# Patient Record
Sex: FEMALE | Race: WHITE | Employment: FULL TIME | URBAN - METROPOLITAN AREA
[De-identification: names, ages, dates, MRNs, and addresses within clinical notes are randomized per-mention and may not be internally consistent; named-entity substitution may affect disease eponyms.]

---

## 2017-01-18 ENCOUNTER — OFFICE VISIT (OUTPATIENT)
Dept: FAMILY MEDICINE CLINIC | Facility: CLINIC | Age: 37
End: 2017-01-18

## 2017-01-18 VITALS
BODY MASS INDEX: 27.89 KG/M2 | SYSTOLIC BLOOD PRESSURE: 104 MMHG | HEART RATE: 80 BPM | HEIGHT: 65 IN | DIASTOLIC BLOOD PRESSURE: 78 MMHG | RESPIRATION RATE: 18 BRPM | TEMPERATURE: 99 F | WEIGHT: 167.38 LBS

## 2017-01-18 DIAGNOSIS — F32.1 MODERATE SINGLE CURRENT EPISODE OF MAJOR DEPRESSIVE DISORDER (HCC): ICD-10-CM

## 2017-01-18 DIAGNOSIS — F41.1 GENERALIZED ANXIETY DISORDER: ICD-10-CM

## 2017-01-18 DIAGNOSIS — Z51.81 ENCOUNTER FOR THERAPEUTIC DRUG MONITORING: Primary | ICD-10-CM

## 2017-01-18 PROCEDURE — 99213 OFFICE O/P EST LOW 20 MIN: CPT | Performed by: FAMILY MEDICINE

## 2017-01-18 RX ORDER — NORGESTIMATE-ETHINYL ESTRADIOL 7DAYSX3 28
TABLET ORAL
Refills: 3 | COMMUNITY
Start: 2016-11-01 | End: 2017-10-18

## 2017-01-19 NOTE — PATIENT INSTRUCTIONS
Depression: Tips to Help Yourself  As your health care providers help treat your depression, you can also help yourself. Keep in mind that your illness affects you emotionally, physically, mentally, and socially. So full recovery will take time.  Take car © 9176-1088 [address], [address] [place]. All rights reserved. This information is not intended as a substitute for professional medical care. Always follow your healthcare professional's instructions.         Anibal Zepeda · Generalized anxiety disorder: This causes constant worry that can greatly disrupt your life. Getting better  You may believe that nothing can help you. Or, you might fear what others may think. But most anxiety symptoms can be eased.  Having an anxiety

## 2017-01-23 NOTE — PROGRESS NOTES
Heber Castillo is a 39year old female.   Patient presents with:  Medication Follow-Up    HPI:   Patient is seen for follow up of depression and anxiety, patient had weaned off medication 6 months ago but recently over the past 1 month has been feeling anx Propionate 50 MCG/ACT Nasal Suspension SPRAY TWICE INTO EACH NOSTRIL DAILY. Due for office visit for further refills. Disp: 1 Bottle Rfl: 0   loratadine (CLARITIN) 10 MG Oral Tab Take 10 mg by mouth daily as needed for Allergies.  Disp:  Rfl:       Past Med

## 2017-02-04 ENCOUNTER — HOSPITAL ENCOUNTER (OUTPATIENT)
Age: 37
Discharge: HOME | End: 2017-02-04
Attending: FAMILY MEDICINE
Payer: COMMERCIAL

## 2017-02-04 VITALS
RESPIRATION RATE: 16 BRPM | BODY MASS INDEX: 27 KG/M2 | DIASTOLIC BLOOD PRESSURE: 89 MMHG | OXYGEN SATURATION: 100 % | WEIGHT: 161 LBS | TEMPERATURE: 98 F | HEART RATE: 86 BPM | SYSTOLIC BLOOD PRESSURE: 143 MMHG

## 2017-02-04 DIAGNOSIS — E87.6 HYPOKALEMIA: ICD-10-CM

## 2017-02-04 DIAGNOSIS — K52.9 GASTROENTERITIS: Primary | ICD-10-CM

## 2017-02-04 DIAGNOSIS — H65.93 MIDDLE EAR EFFUSION, BILATERAL: ICD-10-CM

## 2017-02-04 DIAGNOSIS — J01.90 ACUTE SINUSITIS, RECURRENCE NOT SPECIFIED, UNSPECIFIED LOCATION: ICD-10-CM

## 2017-02-04 LAB
#LYMPHOCYTE IC: 1.5 X10ˆ3/UL (ref 0.9–3.2)
#MXD IC: 1 X10ˆ3/UL (ref 0.1–1)
#NEUTROPHIL IC: 3.4 X10ˆ3/UL (ref 1.3–6.7)
CREAT SERPL-MCNC: 0.6 MG/DL (ref 0.4–1)
GLUCOSE BLD-MCNC: 105 MG/DL (ref 65–99)
HCT IC: 30.2 % (ref 37–54)
HGB IC: 8.7 G/DL (ref 12–16)
ISTAT BLOOD GAS TCO2: 21 MMOL/L (ref 22–32)
ISTAT BUN: <3 MG/DL (ref 8–20)
ISTAT CHLORIDE: 103 MMOL/L (ref 101–111)
ISTAT HEMATOCRIT: 34 % (ref 37–54)
ISTAT IONIZED CALCIUM: 0.95 MMOL/L (ref 1.12–1.32)
ISTAT POTASSIUM: 3.2 MMOL/L (ref 3.6–5.1)
ISTAT SODIUM: 136 MMOL/L (ref 136–144)
MCH IC: 20.4 PG (ref 27–33.2)
MCHC IC: 28.8 G/DL (ref 31–37)
MCV IC: 70.7 FL (ref 81–100)
PLT IC: 316 X10ˆ3/UL (ref 150–450)
POCT BILIRUBIN URINE: NEGATIVE
POCT BLOOD URINE: NEGATIVE
POCT GLUCOSE URINE: NEGATIVE MG/DL
POCT KETONE URINE: NEGATIVE MG/DL
POCT NITRITE URINE: NEGATIVE
POCT PH URINE: 8.5 (ref 5–8)
POCT SPECIFIC GRAVITY URINE: 1.02
POCT URINE COLOR: YELLOW
POCT URINE PREGNANCY: NEGATIVE
POCT UROBILINOGEN URINE: 0.2 MG/DL
RBC IC: 4.27 X10ˆ6/UL (ref 3.8–5.1)
WBC IC: 5.9 X10ˆ3/UL (ref 4–13)

## 2017-02-04 PROCEDURE — 80047 BASIC METABLC PNL IONIZED CA: CPT

## 2017-02-04 PROCEDURE — 87086 URINE CULTURE/COLONY COUNT: CPT | Performed by: FAMILY MEDICINE

## 2017-02-04 PROCEDURE — 85025 COMPLETE CBC W/AUTO DIFF WBC: CPT | Performed by: FAMILY MEDICINE

## 2017-02-04 PROCEDURE — 99214 OFFICE O/P EST MOD 30 MIN: CPT

## 2017-02-04 PROCEDURE — 81025 URINE PREGNANCY TEST: CPT | Performed by: FAMILY MEDICINE

## 2017-02-04 PROCEDURE — 81002 URINALYSIS NONAUTO W/O SCOPE: CPT | Performed by: FAMILY MEDICINE

## 2017-02-04 PROCEDURE — 96360 HYDRATION IV INFUSION INIT: CPT

## 2017-02-04 PROCEDURE — 99204 OFFICE O/P NEW MOD 45 MIN: CPT

## 2017-02-04 RX ORDER — ONDANSETRON 4 MG/1
4 TABLET, ORALLY DISINTEGRATING ORAL ONCE
Status: COMPLETED | OUTPATIENT
Start: 2017-02-04 | End: 2017-02-04

## 2017-02-04 RX ORDER — ONDANSETRON 4 MG/1
4 TABLET, FILM COATED ORAL EVERY 6 HOURS PRN
Qty: 12 TABLET | Refills: 0 | Status: SHIPPED | OUTPATIENT
Start: 2017-02-04 | End: 2017-02-07

## 2017-02-04 RX ORDER — POTASSIUM CHLORIDE 1500 MG/1
1 TABLET, FILM COATED, EXTENDED RELEASE ORAL 2 TIMES DAILY
Qty: 6 TABLET | Refills: 0 | Status: SHIPPED | OUTPATIENT
Start: 2017-02-04 | End: 2017-02-07

## 2017-02-04 RX ORDER — SODIUM CHLORIDE 9 MG/ML
1000 INJECTION, SOLUTION INTRAVENOUS ONCE
Status: COMPLETED | OUTPATIENT
Start: 2017-02-04 | End: 2017-02-04

## 2017-02-04 RX ORDER — POTASSIUM CHLORIDE 20 MEQ/1
40 TABLET, EXTENDED RELEASE ORAL ONCE
Status: COMPLETED | OUTPATIENT
Start: 2017-02-04 | End: 2017-02-04

## 2017-02-04 NOTE — ED INITIAL ASSESSMENT (HPI)
Patient presents to [facility]. Care with a 2 week h/o diarrhea(no blood)approx 4-5 day. +Nausea but no vomiting. Loss of appetite and abd.cramping prior to stools. Remote h/o gastritis. Takes frequent ibuprofen(2-3 x weeks)Weight loss of 4 lbs in 2 weeks. Stopped se

## 2017-02-04 NOTE — ED PROVIDER NOTES
Patient Seen in: [facility]    History   Patient presents with:  Diarrhea  Nausea  Ear Problem Pain (neurosensory)    Stated Complaint: FLU LIKE SYMPTOMS X 2 WKS    HPI  48-year-old female coming in with complaints of  Diarrhea INTO EACH NOSTRIL DAILY   TRINESSA, 28, 0.18/0.215/0.25 MG-35 MCG Oral Tab,  TK 1 T PO D   Sertraline HCl 50 MG Oral Tab,  Take 1 tablet (50 mg total) by mouth daily.    FLUTICASONE PROPIONATE 50 MCG/ACT Nasal Suspension,  SPRAY TWICE IN EACH NOSTRIL DAILY for stated complaint: FLU LIKE SYMPTOMS X 2 WKS  Other systems are as noted in HPI. Constitutional and vital signs reviewed. All other systems reviewed and negative except as noted above.     PSFH elements reviewed from today and agreed except as othe 105 (*)     ISTAT Blood Gas TCO2 21 (*)     All other components within normal limits   POCT PREGNANCY, URINE - Normal   URINE CULTURE, ROUTINE       Orders Placed This Encounter  POCT urinalysis dipstick Once  POCT urine pregnancy Once  POCT CBC Once  [facility] be performed on runny stool. Bananas, coconut water to help with increasing her potassium levels, your also been giving potassium pills 3 days. You will need this rechecked at her primary care physicians office.   Urine Cx pending - if any growth is no

## 2017-02-06 ENCOUNTER — OFFICE VISIT (OUTPATIENT)
Dept: FAMILY MEDICINE CLINIC | Facility: CLINIC | Age: 37
End: 2017-02-06

## 2017-02-06 VITALS
BODY MASS INDEX: 27 KG/M2 | SYSTOLIC BLOOD PRESSURE: 122 MMHG | TEMPERATURE: 98 F | WEIGHT: 162 LBS | OXYGEN SATURATION: 97 % | HEART RATE: 76 BPM | DIASTOLIC BLOOD PRESSURE: 82 MMHG | RESPIRATION RATE: 16 BRPM

## 2017-02-06 DIAGNOSIS — H66.001 ACUTE SUPPURATIVE OTITIS MEDIA OF RIGHT EAR WITHOUT SPONTANEOUS RUPTURE OF TYMPANIC MEMBRANE, RECURRENCE NOT SPECIFIED: Primary | ICD-10-CM

## 2017-02-06 DIAGNOSIS — R09.81 SINUS CONGESTION: ICD-10-CM

## 2017-02-06 PROCEDURE — 99213 OFFICE O/P EST LOW 20 MIN: CPT | Performed by: NURSE PRACTITIONER

## 2017-02-06 RX ORDER — AMOXICILLIN AND CLAVULANATE POTASSIUM 875; 125 MG/1; MG/1
1 TABLET, FILM COATED ORAL 2 TIMES DAILY
Qty: 20 TABLET | Refills: 0 | Status: SHIPPED | OUTPATIENT
Start: 2017-02-06 | End: 2017-02-16

## 2017-02-06 NOTE — PROGRESS NOTES
CHIEF COMPLAINT:   Patient presents with:  Ear Pain      HPI:   Daniel Padilla is a 39year old female who presents to clinic today with complaints of right ear pain. Has had for 3  days. Pain is described as aching.   Patient reports history of ear infe • Pap smear for cervical cancer screening 2/14/2013     wnl   • Allergic rhinitis       Social History:    Smoking Status: Never Smoker                      Smokeless Status: Never Used                        Alcohol Use: Yes           0.0 oz/week       0 Acute suppurative otitis media of right ear without spontaneous rupture of tympanic membrane, recurrence not specified  (primary encounter diagnosis)  Sinus congestion    PLAN: Meds and instructions as listed below.   Comfort measures as described in [place] · You may use over-the-counter medicine, such as acetaminophen or ibuprofen, to control pain and fever, unless something else was prescribed.  If you have chronic liver or kidney disease or have ever had a stomach ulcer or gastrointestinal bleeding, talk wi

## 2017-04-18 NOTE — TELEPHONE ENCOUNTER
Future Appointments  Date Time Provider Peter Judy   4/19/2017 6:00 PM Izabella Eldridge MD [facility] [address]     Will refill at appointment.

## 2017-04-19 ENCOUNTER — OFFICE VISIT (OUTPATIENT)
Dept: FAMILY MEDICINE CLINIC | Facility: CLINIC | Age: 37
End: 2017-04-19

## 2017-04-19 VITALS
DIASTOLIC BLOOD PRESSURE: 66 MMHG | HEART RATE: 62 BPM | RESPIRATION RATE: 20 BRPM | SYSTOLIC BLOOD PRESSURE: 104 MMHG | WEIGHT: 166.5 LBS | HEIGHT: 65 IN | TEMPERATURE: 98 F | BODY MASS INDEX: 27.74 KG/M2 | OXYGEN SATURATION: 100 %

## 2017-04-19 DIAGNOSIS — G43.829 MENSTRUAL MIGRAINE WITHOUT STATUS MIGRAINOSUS, NOT INTRACTABLE: ICD-10-CM

## 2017-04-19 DIAGNOSIS — Z51.81 ENCOUNTER FOR THERAPEUTIC DRUG MONITORING: Primary | ICD-10-CM

## 2017-04-19 DIAGNOSIS — F41.1 GENERALIZED ANXIETY DISORDER: ICD-10-CM

## 2017-04-19 DIAGNOSIS — F32.1 MODERATE SINGLE CURRENT EPISODE OF MAJOR DEPRESSIVE DISORDER (HCC): ICD-10-CM

## 2017-04-19 PROCEDURE — 99214 OFFICE O/P EST MOD 30 MIN: CPT | Performed by: FAMILY MEDICINE

## 2017-04-19 RX ORDER — RIZATRIPTAN BENZOATE 10 MG/1
10 TABLET ORAL AS NEEDED
Qty: 8 TABLET | Refills: 2 | Status: SHIPPED | OUTPATIENT
Start: 2017-04-19 | End: 2017-10-18

## 2017-04-19 NOTE — PATIENT INSTRUCTIONS
Depression: Tips to Help Yourself  As your health care providers help treat your depression, you can also help yourself. Keep in mind that your illness affects you emotionally, physically, mentally, and socially. So full recovery will take time.  Take car © 1448-2237 [address], [address] [place]. All rights reserved. This information is not intended as a substitute for professional medical care. Always follow your healthcare professional's instructions.

## 2017-04-20 ENCOUNTER — TELEPHONE (OUTPATIENT)
Dept: OBGYN CLINIC | Facility: CLINIC | Age: 37
End: 2017-04-20

## 2017-04-20 NOTE — TELEPHONE ENCOUNTER
Refill request received from [place] for Frankbuffy Rocha. Pt last seen 4/16/16 for annual; no pending appt scheduled. [facility] Please schedule annual and route back to RN for refill.

## 2017-04-20 NOTE — TELEPHONE ENCOUNTER
DOTTIEP to call and schedule an Annual.  Nurse will then refill her script. Route to nurse for refill.

## 2017-04-21 RX ORDER — NORGESTIMATE AND ETHINYL ESTRADIOL 7DAYSX3 28
1 KIT ORAL DAILY
Qty: 1 PACKAGE | Refills: 0 | Status: SHIPPED | OUTPATIENT
Start: 2017-04-21 | End: 2017-04-21

## 2017-04-21 RX ORDER — NORGESTIMATE-ETHINYL ESTRADIOL 7DAYSX3 28
TABLET ORAL
Qty: 84 TABLET | Refills: 0 | Status: SHIPPED | OUTPATIENT
Start: 2017-04-21 | End: 2017-04-24

## 2017-04-24 ENCOUNTER — OFFICE VISIT (OUTPATIENT)
Dept: OBGYN CLINIC | Facility: CLINIC | Age: 37
End: 2017-04-24

## 2017-04-24 VITALS
BODY MASS INDEX: 26.82 KG/M2 | SYSTOLIC BLOOD PRESSURE: 112 MMHG | HEIGHT: 65 IN | DIASTOLIC BLOOD PRESSURE: 76 MMHG | WEIGHT: 161 LBS

## 2017-04-24 DIAGNOSIS — Z30.41 SURVEILLANCE FOR BIRTH CONTROL, ORAL CONTRACEPTIVES: ICD-10-CM

## 2017-04-24 DIAGNOSIS — Z01.419 WELL WOMAN EXAM WITH ROUTINE GYNECOLOGICAL EXAM: Primary | ICD-10-CM

## 2017-04-24 PROCEDURE — 99395 PREV VISIT EST AGE 18-39: CPT | Performed by: NURSE PRACTITIONER

## 2017-04-24 RX ORDER — NORGESTIMATE AND ETHINYL ESTRADIOL 7DAYSX3 28
1 KIT ORAL
Qty: 84 TABLET | Refills: 4 | Status: SHIPPED | OUTPATIENT
Start: 2017-04-24 | End: 2018-05-29

## 2017-04-24 NOTE — PROGRESS NOTES
Here for Routine Annual Exam  No concerns or questions  Menses regular  Contraception- trinessa. No C/O      ROS: No Cardiac, Respiratory, GI,  or Neurological symptoms.     PE:  GENERAL: well developed, well nourished, in no apparent distress  SKIN:

## 2017-04-24 NOTE — PROGRESS NOTES
Mariangel Macias is a 39year old female. Patient presents with:  Medication Follow-Up: 3 month medication f/u.     HPI:   Patient is seen for follow-up of anxiety and depression, states medication has been working well and she is able to handle stressors m Antacid (TUMS) 500 MG Oral Chew Tab Chew 2 tablets by mouth 2 (two) times daily. Disp:  Rfl:    Omeprazole-Sodium Bicarbonate (ZEGERID OTC OR) Take 1 tablet by mouth daily.  Disp:  Rfl:    TRINESSA, 28, 0.18/0.215/0.25 MG-35 MCG Oral Tab TAKE 1 TABLET BY MO monitoring    Moderate single current episode of major depressive disorder (HCC)  -     Sertraline HCl 50 MG Oral Tab; Take 1 tablet (50 mg total) by mouth daily. Generalized anxiety disorder  -     Sertraline HCl 50 MG Oral Tab;  Take 1 tablet (50 mg to

## 2017-07-05 DIAGNOSIS — F32.1 MODERATE SINGLE CURRENT EPISODE OF MAJOR DEPRESSIVE DISORDER (HCC): ICD-10-CM

## 2017-07-05 DIAGNOSIS — F41.1 GENERALIZED ANXIETY DISORDER: ICD-10-CM

## 2017-07-06 ENCOUNTER — PATIENT MESSAGE (OUTPATIENT)
Dept: FAMILY MEDICINE CLINIC | Facility: CLINIC | Age: 37
End: 2017-07-06

## 2017-07-06 NOTE — TELEPHONE ENCOUNTER
From: Evgeny Peralta  Sent: 7/5/2017 5:30 PM CDT  Subject: Medication Renewal Request    Franky Rowdy.  Vicky Brewer would like a refill of the following medications:  Sertraline HCl 50 MG Oral Tab [KITTY Wray MD]    Preferred pharmacy: [phone]

## 2017-07-06 NOTE — TELEPHONE ENCOUNTER
Future Appointments  Date Time Provider Peter Judy   10/18/2017 6:00 PM Yaquelin Dillon MD [facility] [address]     LOV 4/17    LAST LAB    LAST RX   Sertraline HCl 50 MG Oral Tab 90 tablet 1 4/19/2017       Denied already filled.      Has enough unti

## 2017-07-06 NOTE — TELEPHONE ENCOUNTER
Toño Yanez     LAST RX   Sertraline HCl 50 MG Oral Tab 90 tablet 1 4/19/2017         Denied already filled. Patient got 6 months in April.      Your refill was denied as you can see that you have  enough until your appointment.        Enjoy your day    Earlene Klein

## 2017-07-06 NOTE — TELEPHONE ENCOUNTER
From: Jefferson Leonard  To: Makenna Stephens MD  Sent: 7/6/2017 1:16 PM CDT  Subject: Prescription Question    Can I get a refill of my Sertraline? It has been denied and I'm not sure why.

## 2017-07-12 RX ORDER — NORGESTIMATE-ETHINYL ESTRADIOL 7DAYSX3 28
TABLET ORAL
Qty: 84 TABLET | Refills: 0 | OUTPATIENT
Start: 2017-07-12

## 2017-10-18 ENCOUNTER — OFFICE VISIT (OUTPATIENT)
Dept: FAMILY MEDICINE CLINIC | Facility: CLINIC | Age: 37
End: 2017-10-18

## 2017-10-18 VITALS
WEIGHT: 161.5 LBS | DIASTOLIC BLOOD PRESSURE: 76 MMHG | SYSTOLIC BLOOD PRESSURE: 130 MMHG | HEIGHT: 65 IN | TEMPERATURE: 99 F | HEART RATE: 68 BPM | BODY MASS INDEX: 26.91 KG/M2

## 2017-10-18 DIAGNOSIS — Z79.899 ENCOUNTER FOR LONG-TERM (CURRENT) USE OF MEDICATIONS: Primary | ICD-10-CM

## 2017-10-18 DIAGNOSIS — F41.1 GENERALIZED ANXIETY DISORDER: ICD-10-CM

## 2017-10-18 DIAGNOSIS — F32.1 MODERATE SINGLE CURRENT EPISODE OF MAJOR DEPRESSIVE DISORDER (HCC): ICD-10-CM

## 2017-10-18 DIAGNOSIS — G43.829 MENSTRUAL MIGRAINE WITHOUT STATUS MIGRAINOSUS, NOT INTRACTABLE: ICD-10-CM

## 2017-10-18 PROCEDURE — 99213 OFFICE O/P EST LOW 20 MIN: CPT | Performed by: FAMILY MEDICINE

## 2017-10-18 RX ORDER — RIZATRIPTAN BENZOATE 10 MG/1
10 TABLET ORAL AS NEEDED
Qty: 8 TABLET | Refills: 3 | Status: SHIPPED | OUTPATIENT
Start: 2017-10-18 | End: 2018-02-05

## 2017-10-18 NOTE — PATIENT INSTRUCTIONS
Depression: Tips to Help Yourself  As your healthcare providers help treat your depression, you can also help yourself. Keep in mind that your illness affects you emotionally, physically, mentally, and socially. So full recovery will take time.  Take care · Be with others. Don’t isolate yourself—you’ll only feel worse. Try to be with other people. And take part in fun activities when you can. Go to a movie, ballgame, [employer] service, or social event.  Talk openly with people you can trust. And accept help

## 2017-10-23 NOTE — PROGRESS NOTES
Tashi Turner is a 44year old female. Patient presents with:  Medication Follow-Up: Six month f/u for Setraline. Medication Request: Needs more migraine medication.   Imm/Inj: Declined flu vaccine    HPI:   Patient is seen for follow-up of anxiety and Smokeless tobacco: Never Used                      Alcohol use: Yes           0.0 oz/week     Comment: OCC, Socially 2-3 beers       REVIEW OF SYSTEMS:   GENERAL HEALTH: feels well otherwise  PSYCH: as per HPI  NEURO: denies di

## 2017-12-26 DIAGNOSIS — G43.829 MENSTRUAL MIGRAINE WITHOUT STATUS MIGRAINOSUS, NOT INTRACTABLE: ICD-10-CM

## 2017-12-26 RX ORDER — RIZATRIPTAN BENZOATE 10 MG/1
10 TABLET ORAL AS NEEDED
Qty: 8 TABLET | Refills: 3
Start: 2017-12-26 | End: 2018-01-25

## 2017-12-26 NOTE — TELEPHONE ENCOUNTER
New Rx was sent in October  Medication Quantity Refills Start End   Rizatriptan Benzoate 10 MG Oral Tab 8 tablet 3 10/18/2017 11/17/2017     DENIED AS DUPLICATE, INSTRUCTIONS TO PHARMACY TO CHECK FOR NEW/ REFILLS

## 2017-12-26 NOTE — TELEPHONE ENCOUNTER
From: Rebecca Melendez  Sent: 12/26/2017 1:24 PM CST  Subject: Medication Renewal Request    Keyshawn Fanny.  Pavithra Colón would like a refill of the following medications:     Rizatriptan Benzoate 10 MG Oral Tab [WILBER Wray MD]    Preferred pharmacy: [facility]

## 2018-02-05 DIAGNOSIS — G43.829 MENSTRUAL MIGRAINE WITHOUT STATUS MIGRAINOSUS, NOT INTRACTABLE: ICD-10-CM

## 2018-02-06 RX ORDER — RIZATRIPTAN BENZOATE 10 MG/1
TABLET ORAL
Qty: 8 TABLET | Refills: 1 | Status: SHIPPED | OUTPATIENT
Start: 2018-02-06 | End: 2018-04-18

## 2018-02-06 NOTE — TELEPHONE ENCOUNTER
Future Appointments  Date Time Provider Peter Judy   4/18/2018 6:00 PM Shayy Castellano MD [facility] [address]     LOV 10/17    LAST LAB    LAST RX   Rizatriptan Benzoate 10 MG Oral Tab 8 tablet 3 10/18/2017       PROTOCOL  Please advise.  No protocol

## 2018-04-18 ENCOUNTER — OFFICE VISIT (OUTPATIENT)
Dept: FAMILY MEDICINE CLINIC | Facility: CLINIC | Age: 38
End: 2018-04-18

## 2018-04-18 VITALS
BODY MASS INDEX: 26.86 KG/M2 | TEMPERATURE: 98 F | DIASTOLIC BLOOD PRESSURE: 86 MMHG | HEIGHT: 64.5 IN | WEIGHT: 159.25 LBS | RESPIRATION RATE: 16 BRPM | OXYGEN SATURATION: 98 % | HEART RATE: 80 BPM | SYSTOLIC BLOOD PRESSURE: 122 MMHG

## 2018-04-18 DIAGNOSIS — Z79.899 ENCOUNTER FOR LONG-TERM CURRENT USE OF MEDICATION: Primary | ICD-10-CM

## 2018-04-18 DIAGNOSIS — F32.1 MODERATE SINGLE CURRENT EPISODE OF MAJOR DEPRESSIVE DISORDER (HCC): ICD-10-CM

## 2018-04-18 DIAGNOSIS — G43.829 MENSTRUAL MIGRAINE WITHOUT STATUS MIGRAINOSUS, NOT INTRACTABLE: ICD-10-CM

## 2018-04-18 DIAGNOSIS — F41.1 GENERALIZED ANXIETY DISORDER: ICD-10-CM

## 2018-04-18 PROCEDURE — 99213 OFFICE O/P EST LOW 20 MIN: CPT | Performed by: FAMILY MEDICINE

## 2018-04-18 RX ORDER — RIZATRIPTAN BENZOATE 10 MG/1
TABLET ORAL
Qty: 8 TABLET | Refills: 5 | Status: SHIPPED | OUTPATIENT
Start: 2018-04-18 | End: 2018-09-18

## 2018-04-18 NOTE — PATIENT INSTRUCTIONS
Understanding Anxiety Disorders  Almost everyone gets nervous now and then. It’s normal to have knots in your stomach before a test, or for your heart to race on a first date. But an anxiety disorder is much more than a case of nerves.  In fact, its sympt You may believe that nothing can help you. Or, you might fear what others may think. But most anxiety symptoms can be eased. Having an anxiety disorder is nothing to be ashamed of. Most people do best with treatment that combines medicine and therapy.  Thes

## 2018-04-23 NOTE — PROGRESS NOTES
Roderick Samaniego is a 44year old female. Patient presents with:  Medication Follow-Up: 6 month med refill Setraline and patient requesting a little more of the rizatriptan if possible.     HPI:   Patient is seen for follow-up of anxiety and depression, sta mood and affect, good eye contact, normal speech, no thought disorder. ASSESSMENT AND PLAN:   Joe Navarro was seen today for medication follow-up.     Diagnoses and all orders for this visit:    Encounter for long-term current use of medication    Moderate s

## 2018-05-02 DIAGNOSIS — F41.1 GENERALIZED ANXIETY DISORDER: ICD-10-CM

## 2018-05-02 DIAGNOSIS — F32.1 MODERATE SINGLE CURRENT EPISODE OF MAJOR DEPRESSIVE DISORDER (HCC): ICD-10-CM

## 2018-05-02 NOTE — TELEPHONE ENCOUNTER
LOV   Visit date not found    LAST LAB    LAST RX  Sertraline HCl 50 MG Oral Tab 90 tablet 1 4/18/2018           Denied already done.

## 2018-05-03 ENCOUNTER — PATIENT MESSAGE (OUTPATIENT)
Dept: FAMILY MEDICINE CLINIC | Facility: CLINIC | Age: 38
End: 2018-05-03

## 2018-05-04 NOTE — TELEPHONE ENCOUNTER
From: Larry Petit  To: Patricia Segura MD  Sent: 5/3/2018 7:18 PM CDT  Subject: Prescription Question    I'm not sure why my prescription was denied?

## 2018-05-22 NOTE — TELEPHONE ENCOUNTER
Patient last seen 4/2017; due for annual. Please contact her to schedule appt and then return to RN pool for refill.  Thank you

## 2018-05-29 RX ORDER — NORGESTIMATE AND ETHINYL ESTRADIOL 7DAYSX3 28
1 KIT ORAL
Qty: 84 TABLET | Refills: 0
Start: 2018-05-29 | End: 2018-06-22

## 2018-05-29 NOTE — TELEPHONE ENCOUNTER
From: Daniel Padilla  Sent: 5/29/2018 11:39 AM CDT  Subject: Medication Renewal Request    Blair Arce.  Grant Martin would like a refill of the following medications:     Norgestim-Eth Estrad Triphasic (TRINESSA, 28,) 0.18/0.215/0.25 MG-35 MCG Oral Tab Janie Gorman

## 2018-06-04 RX ORDER — NORGESTIMATE-ETHINYL ESTRADIOL 7DAYSX3 28
TABLET ORAL
Qty: 84 TABLET | Refills: 0 | Status: SHIPPED | OUTPATIENT
Start: 2018-06-04 | End: 2018-06-22

## 2018-06-22 ENCOUNTER — OFFICE VISIT (OUTPATIENT)
Dept: OBGYN CLINIC | Facility: CLINIC | Age: 38
End: 2018-06-22

## 2018-06-22 VITALS
DIASTOLIC BLOOD PRESSURE: 78 MMHG | WEIGHT: 162 LBS | HEIGHT: 65 IN | BODY MASS INDEX: 26.99 KG/M2 | SYSTOLIC BLOOD PRESSURE: 120 MMHG

## 2018-06-22 DIAGNOSIS — Z01.419 WELL WOMAN EXAM WITH ROUTINE GYNECOLOGICAL EXAM: Primary | ICD-10-CM

## 2018-06-22 DIAGNOSIS — Z30.41 SURVEILLANCE FOR BIRTH CONTROL, ORAL CONTRACEPTIVES: ICD-10-CM

## 2018-06-22 PROCEDURE — 99395 PREV VISIT EST AGE 18-39: CPT | Performed by: NURSE PRACTITIONER

## 2018-06-22 RX ORDER — NORGESTIMATE AND ETHINYL ESTRADIOL 7DAYSX3 28
1 KIT ORAL
Qty: 84 TABLET | Refills: 4 | Status: SHIPPED | OUTPATIENT
Start: 2018-06-22 | End: 2019-07-23

## 2018-06-22 NOTE — PROGRESS NOTES
Here for Routine Annual Exam  No concerns or questions. Menses are regular, no concern. Contraception- OCP, doing well and would like refills. No C/O, denies vaginal or urinary concerns.     ROS: No Cardiac, Respiratory, GI,  or Neurological sympto

## 2018-09-18 DIAGNOSIS — G43.829 MENSTRUAL MIGRAINE WITHOUT STATUS MIGRAINOSUS, NOT INTRACTABLE: ICD-10-CM

## 2018-09-20 NOTE — TELEPHONE ENCOUNTER
LOV 4/18    LAST LAB    LAST RX   Rizatriptan Benzoate 10 MG Oral Tab 8 tablet 5 4/18/2018         Next OV Visit date not found    PROTOCOL  Please advise. No protocol. If filled. Please close.    Thank Fidencio Baez

## 2018-09-21 RX ORDER — RIZATRIPTAN BENZOATE 10 MG/1
TABLET ORAL
Qty: 8 TABLET | Refills: 2 | Status: SHIPPED | OUTPATIENT
Start: 2018-09-21 | End: 2018-11-04

## 2018-10-13 DIAGNOSIS — F32.1 MODERATE SINGLE CURRENT EPISODE OF MAJOR DEPRESSIVE DISORDER (HCC): ICD-10-CM

## 2018-10-13 DIAGNOSIS — F41.1 GENERALIZED ANXIETY DISORDER: ICD-10-CM

## 2018-10-15 NOTE — TELEPHONE ENCOUNTER
LOV 4/18     LAST LAB     LAST RX   Sertraline HCl 50 MG Oral Tab 90 tablet 1 4/18/2018 7/17/2018        Next OV Visit date not found     PROTOCOL  Please advise. No protocol. If filled. Please close. Thank You        Patietnt move?   Joanne Zhang

## 2018-11-04 DIAGNOSIS — G43.829 MENSTRUAL MIGRAINE WITHOUT STATUS MIGRAINOSUS, NOT INTRACTABLE: ICD-10-CM

## 2018-11-05 NOTE — TELEPHONE ENCOUNTER
LOV   4/18/18    LAST LAB  N/A    LAST RX 9/21/18  #8  2 refills    Next OV No future appointments. PROTOCOL- NONE  RIZATRIPTAN 10MG TABLETS    VMML for patient to schedule 6 month medication F/U appt.     Dr. Jb Li,  Please advise     Order pended

## 2018-11-06 RX ORDER — RIZATRIPTAN BENZOATE 10 MG/1
TABLET ORAL
Qty: 8 TABLET | Refills: 0 | Status: SHIPPED | OUTPATIENT
Start: 2018-11-06 | End: 2019-03-06

## 2018-11-07 RX ORDER — NORGESTIMATE-ETHINYL ESTRADIOL 7DAYSX3 28
TABLET ORAL
Qty: 84 TABLET | Refills: 0 | Status: SHIPPED | OUTPATIENT
Start: 2018-11-07 | End: 2019-03-06

## 2018-11-08 DIAGNOSIS — G43.829 MENSTRUAL MIGRAINE WITHOUT STATUS MIGRAINOSUS, NOT INTRACTABLE: ICD-10-CM

## 2018-11-09 RX ORDER — RIZATRIPTAN BENZOATE 10 MG/1
TABLET ORAL
Qty: 8 TABLET | Refills: 0 | OUTPATIENT
Start: 2018-11-09

## 2018-11-09 NOTE — TELEPHONE ENCOUNTER
LOV    LAST LAB    LAST RX 11/4/18  Amish Collins MD          11:38 AM   Note      Refilled #8, patient needs to follow up for further refills. Next OV    PROTOCOL        Next OV Visit date not found     PROTOCOL  Please advise.  No protoc

## 2018-12-19 ENCOUNTER — TELEPHONE (OUTPATIENT)
Dept: FAMILY MEDICINE CLINIC | Facility: CLINIC | Age: 38
End: 2018-12-19

## 2018-12-19 NOTE — TELEPHONE ENCOUNTER
Spoke with pt, she had had loose stools for several days    Pt can't take off work as it's a new job    Advised to push fluids to stay hydrated, watch diet and try OTC imodium.   If symptoms worsen please call back for appt or go to urgent care for evaluati

## 2019-01-05 ENCOUNTER — OFFICE VISIT (OUTPATIENT)
Dept: FAMILY MEDICINE CLINIC | Facility: CLINIC | Age: 39
End: 2019-01-05
Payer: COMMERCIAL

## 2019-01-05 VITALS
TEMPERATURE: 97 F | BODY MASS INDEX: 27 KG/M2 | OXYGEN SATURATION: 98 % | DIASTOLIC BLOOD PRESSURE: 70 MMHG | SYSTOLIC BLOOD PRESSURE: 112 MMHG | RESPIRATION RATE: 18 BRPM | WEIGHT: 160.38 LBS | HEART RATE: 70 BPM

## 2019-01-05 DIAGNOSIS — E78.2 MIXED HYPERLIPIDEMIA: ICD-10-CM

## 2019-01-05 DIAGNOSIS — F41.1 GENERALIZED ANXIETY DISORDER: ICD-10-CM

## 2019-01-05 DIAGNOSIS — Z51.81 ENCOUNTER FOR THERAPEUTIC DRUG MONITORING: Primary | ICD-10-CM

## 2019-01-05 DIAGNOSIS — L65.9 HAIR LOSS: ICD-10-CM

## 2019-01-05 PROCEDURE — 99213 OFFICE O/P EST LOW 20 MIN: CPT | Performed by: FAMILY MEDICINE

## 2019-01-29 RX ORDER — NORGESTIMATE-ETHINYL ESTRADIOL 7DAYSX3 28
TABLET ORAL
Qty: 84 TABLET | Refills: 0 | OUTPATIENT
Start: 2019-01-29

## 2019-02-16 ENCOUNTER — LABORATORY ENCOUNTER (OUTPATIENT)
Dept: LAB | Age: 39
End: 2019-02-16
Attending: FAMILY MEDICINE
Payer: COMMERCIAL

## 2019-02-16 DIAGNOSIS — L65.9 HAIR LOSS: ICD-10-CM

## 2019-02-16 LAB
BASOPHILS # BLD AUTO: 0.13 X10(3) UL (ref 0–0.2)
BASOPHILS NFR BLD AUTO: 1.9 %
DEPRECATED RDW RBC AUTO: 45.8 FL (ref 35.1–46.3)
EOSINOPHIL # BLD AUTO: 0.23 X10(3) UL (ref 0–0.7)
EOSINOPHIL NFR BLD AUTO: 3.3 %
ERYTHROCYTE [DISTWIDTH] IN BLOOD BY AUTOMATED COUNT: 18.9 % (ref 11–15)
HCT VFR BLD AUTO: 31.7 % (ref 35–48)
HGB BLD-MCNC: 8.5 G/DL (ref 12–16)
IMM GRANULOCYTES # BLD AUTO: 0.02 X10(3) UL (ref 0–1)
IMM GRANULOCYTES NFR BLD: 0.3 %
LYMPHOCYTES # BLD AUTO: 2.94 X10(3) UL (ref 1–4)
LYMPHOCYTES NFR BLD AUTO: 42.7 %
MCH RBC QN AUTO: 18.5 PG (ref 26–34)
MCHC RBC AUTO-ENTMCNC: 26.8 G/DL (ref 31–37)
MCV RBC AUTO: 69.1 FL (ref 80–100)
MONOCYTES # BLD AUTO: 0.4 X10(3) UL (ref 0.1–1)
MONOCYTES NFR BLD AUTO: 5.8 %
NEUTROPHILS # BLD AUTO: 3.17 X10 (3) UL (ref 1.5–7.7)
NEUTROPHILS # BLD AUTO: 3.17 X10(3) UL (ref 1.5–7.7)
NEUTROPHILS NFR BLD AUTO: 46 %
PLATELET # BLD AUTO: 423 10(3)UL (ref 150–450)
RBC # BLD AUTO: 4.59 X10(6)UL (ref 3.8–5.3)
TSI SER-ACNC: 1.23 MIU/ML (ref 0.36–3.74)
VIT B12 SERPL-MCNC: 640 PG/ML (ref 193–986)
VIT D+METAB SERPL-MCNC: 19.3 NG/ML (ref 30–100)
WBC # BLD AUTO: 6.9 X10(3) UL (ref 4–11)

## 2019-02-16 PROCEDURE — 36415 COLL VENOUS BLD VENIPUNCTURE: CPT | Performed by: FAMILY MEDICINE

## 2019-02-16 PROCEDURE — 82607 VITAMIN B-12: CPT | Performed by: FAMILY MEDICINE

## 2019-02-16 PROCEDURE — 85025 COMPLETE CBC W/AUTO DIFF WBC: CPT | Performed by: FAMILY MEDICINE

## 2019-02-16 PROCEDURE — 82306 VITAMIN D 25 HYDROXY: CPT | Performed by: FAMILY MEDICINE

## 2019-02-16 PROCEDURE — 84443 ASSAY THYROID STIM HORMONE: CPT | Performed by: FAMILY MEDICINE

## 2019-02-18 NOTE — PROGRESS NOTES
Pt notified of results and information given. Left message on phone. Patient already viewed results.

## 2019-03-06 ENCOUNTER — OFFICE VISIT (OUTPATIENT)
Dept: FAMILY MEDICINE CLINIC | Facility: CLINIC | Age: 39
End: 2019-03-06
Payer: COMMERCIAL

## 2019-03-06 VITALS
TEMPERATURE: 98 F | DIASTOLIC BLOOD PRESSURE: 80 MMHG | BODY MASS INDEX: 27 KG/M2 | OXYGEN SATURATION: 98 % | HEART RATE: 75 BPM | RESPIRATION RATE: 16 BRPM | WEIGHT: 164 LBS | SYSTOLIC BLOOD PRESSURE: 122 MMHG

## 2019-03-06 DIAGNOSIS — D50.9 IRON DEFICIENCY ANEMIA, UNSPECIFIED IRON DEFICIENCY ANEMIA TYPE: ICD-10-CM

## 2019-03-06 DIAGNOSIS — E55.9 VITAMIN D INSUFFICIENCY: Primary | ICD-10-CM

## 2019-03-06 DIAGNOSIS — K21.9 GASTROESOPHAGEAL REFLUX DISEASE WITHOUT ESOPHAGITIS: ICD-10-CM

## 2019-03-06 DIAGNOSIS — G43.829 MENSTRUAL MIGRAINE WITHOUT STATUS MIGRAINOSUS, NOT INTRACTABLE: ICD-10-CM

## 2019-03-06 PROCEDURE — 99213 OFFICE O/P EST LOW 20 MIN: CPT | Performed by: FAMILY MEDICINE

## 2019-03-06 RX ORDER — ERGOCALCIFEROL 1.25 MG/1
50000 CAPSULE ORAL WEEKLY
Qty: 4 CAPSULE | Refills: 2 | Status: SHIPPED | OUTPATIENT
Start: 2019-03-06 | End: 2019-03-06

## 2019-03-06 RX ORDER — RIZATRIPTAN BENZOATE 10 MG/1
TABLET ORAL
Qty: 8 TABLET | Refills: 5 | Status: SHIPPED | OUTPATIENT
Start: 2019-03-06 | End: 2019-10-03

## 2019-03-07 NOTE — PATIENT INSTRUCTIONS
FERROUS SULFATE 325 MG 1 TABLET 2 TIMES DAILY. Tips to Control Acid Reflux    To control acid reflux, you’ll need to make some basic diet and lifestyle changes. The simple steps outlined below may be all you’ll need to ease discomfort.   Watch what you e · Legumes such as dried beans and lentils  · Breads and cereals with iron added  · Blackstrap molasses  · Spinach  · Foods cooked in cast-iron pans. This is especially true of acidic foods, such as tomatoes and ned. Why use a supplement?   Women often

## 2019-03-07 NOTE — PROGRESS NOTES
Larry Petit is a 43year old female. Patient presents with:  Test Results: Go over lab results. HPI:   Patient is seen for follow up and to discuss her lab results. Has fatigue, hair loss, shortness of breath, and palpitations.   Periods are regula Smoker      Smokeless tobacco: Never Used    Alcohol use:  Yes      Alcohol/week: 0.0 oz      Comment: OCC, Socially 2-3 beers    Drug use: No       REVIEW OF SYSTEMS:   GENERAL HEALTH: feels well otherwise  RESPIRATORY: as per HPI  CARDIOVASCULAR: as per H deficiency anemia type  -     CBC WITH DIFFERENTIAL WITH PLATELET; Future  -     IRON AND TIBC;  Future  -     FERRITIN; Future  -     GASTRO - INTERNAL    Menstrual migraine without status migrainosus, not intractable  -     Rizatriptan Benzoate 10 MG Oral

## 2019-03-08 RX ORDER — ERGOCALCIFEROL 1.25 MG/1
CAPSULE ORAL
Qty: 13 CAPSULE | Refills: 0 | Status: SHIPPED | OUTPATIENT
Start: 2019-03-08 | End: 2019-09-20

## 2019-03-08 NOTE — TELEPHONE ENCOUNTER
DENIED AS DUPLICATE, INSTRUCTIONS TO PHARMACY TO CHECK FOR NEW/ REFILLS  New rx sent 3/6/19 x 12 weeks

## 2019-03-10 PROBLEM — E55.9 VITAMIN D INSUFFICIENCY: Status: ACTIVE | Noted: 2019-03-10

## 2019-03-10 PROBLEM — D50.9 IRON DEFICIENCY ANEMIA: Status: ACTIVE | Noted: 2019-03-10

## 2019-05-14 ENCOUNTER — TELEPHONE (OUTPATIENT)
Dept: FAMILY MEDICINE CLINIC | Facility: CLINIC | Age: 39
End: 2019-05-14

## 2019-05-14 NOTE — TELEPHONE ENCOUNTER
Returned call to patient. States her knee was injured last night. It is not painful today, but she is keeping it elevated with ice. States knee has felt \"loose\" before, but not like this. Has never seen Ortho for this problem before.   Offered appt to

## 2019-05-14 NOTE — TELEPHONE ENCOUNTER
Pt called stating she dislocated her knee at a concert last nite - did get it confirmed with the Medic at the concert facility. Wants to know if Dr would recommend an orthopedic in the area. Please advise.

## 2019-05-15 ENCOUNTER — OFFICE VISIT (OUTPATIENT)
Dept: FAMILY MEDICINE CLINIC | Facility: CLINIC | Age: 39
End: 2019-05-15
Payer: COMMERCIAL

## 2019-05-15 VITALS
DIASTOLIC BLOOD PRESSURE: 80 MMHG | HEART RATE: 76 BPM | SYSTOLIC BLOOD PRESSURE: 120 MMHG | RESPIRATION RATE: 14 BRPM | TEMPERATURE: 99 F | BODY MASS INDEX: 28.16 KG/M2 | HEIGHT: 65 IN | WEIGHT: 169 LBS

## 2019-05-15 DIAGNOSIS — S83.004A DISLOCATION OF RIGHT PATELLA, INITIAL ENCOUNTER: Primary | ICD-10-CM

## 2019-05-15 DIAGNOSIS — H50.9 STRABISMUS: ICD-10-CM

## 2019-05-15 DIAGNOSIS — S86.811A PATELLAR TENDON STRAIN, RIGHT, INITIAL ENCOUNTER: ICD-10-CM

## 2019-05-15 PROCEDURE — 99213 OFFICE O/P EST LOW 20 MIN: CPT | Performed by: FAMILY MEDICINE

## 2019-05-15 NOTE — PROGRESS NOTES
Quentin Bales is a 43year old female. Patient presents with:  Knee Pain: Dislocated her right knee cap here for follow up. HPI:   Patient complaining of right knee pain since Monday.   States was in [address] and up on her feet all day, was at a conc daily. Disp:  Rfl:    Omeprazole-Sodium Bicarbonate (ZEGERID OTC OR) Take 1 tablet by mouth daily.  Disp:  Rfl:       Past Medical History:   Diagnosis Date   • Allergic rhinitis    • Depressive disorder, not elsewhere classified    • Dysmenorrhea    • Esop (CPT=59390);  Future

## 2019-05-15 NOTE — PATIENT INSTRUCTIONS
Reducing Knee Pain and Swelling    Many treatments can help reduce pain and swelling in your knee. Your healthcare provider or physical therapist may suggest one or more of the following treatments:  · Icing your knee. This helps reduce swelling.  You may

## 2019-05-20 PROBLEM — S83.004A DISLOCATION OF RIGHT PATELLA: Status: ACTIVE | Noted: 2019-05-20

## 2019-05-20 PROBLEM — H50.9 STRABISMUS: Status: ACTIVE | Noted: 2019-05-20

## 2019-05-20 RX ORDER — ERGOCALCIFEROL 1.25 MG/1
CAPSULE ORAL
Qty: 4 CAPSULE | Refills: 0 | OUTPATIENT
Start: 2019-05-20

## 2019-05-20 NOTE — TELEPHONE ENCOUNTER
Patient was given a 90 day prescription, if she is done with it can take over the counter vitamin D 2000 IU daily.

## 2019-05-20 NOTE — TELEPHONE ENCOUNTER
Name from pharmacy: VITAMIN D2 50,000IU ([address]) CAP RX          Will file in chart as: ERGOCALCIFEROL 56438 units Oral Cap    The source prescription was reordered on 3/8/2019 by Lashonda Rashid LPN.     Sig: TAKE 1 CAPSULE BY MOUTH 1 TIME A WEEK    Disp:

## 2019-05-20 NOTE — TELEPHONE ENCOUNTER
Left message patient to take OTC vitamin if prescription was completed patient to call office if further questions.

## 2019-06-26 DIAGNOSIS — F41.1 GENERALIZED ANXIETY DISORDER: ICD-10-CM

## 2019-06-28 NOTE — TELEPHONE ENCOUNTER
Name from pharmacy: SERTRALINE 50MG TABLETS          Will file in chart as: SERTRALINE HCL 50 MG Oral Tab    Sig: TAKE 1 TABLET(50 MG) BY MOUTH DAILY    Disp:  90 tablet    Refills:  0    Start: 6/26/2019    Class: Normal    For: Generalized anxiety disord

## 2019-07-21 DIAGNOSIS — F41.1 GENERALIZED ANXIETY DISORDER: ICD-10-CM

## 2019-07-22 NOTE — TELEPHONE ENCOUNTER
30 day Rx was sent 6/26/19 to [facility]  Per dispense Hx this has not been filled.     Left message for pt to see if she's home to schedule F/U appt

## 2019-07-23 ENCOUNTER — PATIENT MESSAGE (OUTPATIENT)
Dept: FAMILY MEDICINE CLINIC | Facility: CLINIC | Age: 39
End: 2019-07-23

## 2019-07-23 NOTE — TELEPHONE ENCOUNTER
From: Vero Marvin  To: Soni Martin MD  Sent: 7/23/2019 7:44 AM CDT  Subject: Visit [facility] Dr. Lisandro Damian--    I'm in between insurance companies right now due to a job shift. Once I have my new card, I'll come in for a visit.

## 2019-07-31 RX ORDER — NORGESTIMATE AND ETHINYL ESTRADIOL 7DAYSX3 28
1 KIT ORAL
Qty: 84 TABLET | Refills: 0 | Status: SHIPPED | OUTPATIENT
Start: 2019-07-31 | End: 2019-09-20

## 2019-07-31 NOTE — TELEPHONE ENCOUNTER
Pt scheduled    Future Appointments   Date Time Provider Peter Kim   9/11/2019  6:00 PM Winsome Soriano MD [facility] IM   9/20/2019  2:00 PM ELIEL Cheney [facility]

## 2019-08-07 DIAGNOSIS — F41.1 GENERALIZED ANXIETY DISORDER: ICD-10-CM

## 2019-08-08 NOTE — TELEPHONE ENCOUNTER
LOV 5/19    LAST LAB 2/19    LAST RX   SERTRALINE HCL 50 MG Oral Tab 30 tablet 0 6/28/2019    Sig: Ayla Canas 1 TABLET(50 MG) BY MOUTH DAILY           Next OV Visit date not found      PROTOCOL  Please advise. No protocol. If filled. Please close.    Thank You

## 2019-08-09 ENCOUNTER — PATIENT MESSAGE (OUTPATIENT)
Dept: FAMILY MEDICINE CLINIC | Facility: CLINIC | Age: 39
End: 2019-08-09

## 2019-08-09 DIAGNOSIS — F41.1 GENERALIZED ANXIETY DISORDER: ICD-10-CM

## 2019-08-09 NOTE — TELEPHONE ENCOUNTER
From: Andrew Hammond  To: Amish Collins MD  Sent: 8/9/2019 1:59 PM CDT  Subject: Prescription Question    I won't be able to make it in for a follow up until Sept 11. Can I get a sertraline refill before then?

## 2019-08-09 NOTE — TELEPHONE ENCOUNTER
Prescription for #30 already sent, patient needs to follow up for further refills, please see previous TE for Rx.

## 2019-08-09 NOTE — TELEPHONE ENCOUNTER
Patient's LOV was 1/5/19, please advise patient refilled #30, cannot give further refills without an appointment. Please have her schedule a follow up.

## 2019-08-09 NOTE — TELEPHONE ENCOUNTER
Name from pharmacy: SERTRALINE 50MG TABLETS         Will file in chart as: SERTRALINE HCL 50 MG Oral Tab    Sig: TAKE 1 TABLET(50 MG) BY MOUTH DAILY    Disp:  90 tablet    Refills:  0    Start: 8/9/2019    Class: Normal    For: Generalized anxiety disorde

## 2019-08-17 ENCOUNTER — OFFICE VISIT (OUTPATIENT)
Dept: FAMILY MEDICINE CLINIC | Facility: CLINIC | Age: 39
End: 2019-08-17
Payer: COMMERCIAL

## 2019-08-17 VITALS
DIASTOLIC BLOOD PRESSURE: 70 MMHG | BODY MASS INDEX: 28.56 KG/M2 | TEMPERATURE: 99 F | HEART RATE: 68 BPM | SYSTOLIC BLOOD PRESSURE: 122 MMHG | OXYGEN SATURATION: 99 % | WEIGHT: 173.5 LBS | RESPIRATION RATE: 18 BRPM | HEIGHT: 65.5 IN

## 2019-08-17 DIAGNOSIS — D50.9 IRON DEFICIENCY ANEMIA, UNSPECIFIED IRON DEFICIENCY ANEMIA TYPE: ICD-10-CM

## 2019-08-17 DIAGNOSIS — R63.5 WEIGHT GAIN: ICD-10-CM

## 2019-08-17 DIAGNOSIS — F41.1 GENERALIZED ANXIETY DISORDER: ICD-10-CM

## 2019-08-17 DIAGNOSIS — Z51.81 ENCOUNTER FOR THERAPEUTIC DRUG MONITORING: Primary | ICD-10-CM

## 2019-08-17 DIAGNOSIS — E55.9 VITAMIN D INSUFFICIENCY: ICD-10-CM

## 2019-08-17 DIAGNOSIS — E78.2 MIXED HYPERLIPIDEMIA: ICD-10-CM

## 2019-08-17 PROBLEM — S83.004A DISLOCATION OF RIGHT PATELLA: Status: RESOLVED | Noted: 2019-05-20 | Resolved: 2019-08-17

## 2019-08-17 PROCEDURE — 99213 OFFICE O/P EST LOW 20 MIN: CPT | Performed by: FAMILY MEDICINE

## 2019-08-17 NOTE — PATIENT INSTRUCTIONS
Continue to walk for exercise. Add weights to your exercise routine. Limit refined carbohydrates and sugars in diet, watch your portion size with healthy foods. Recheck your labs in 2 months.

## 2019-08-17 NOTE — PROGRESS NOTES
Emmie Cates is a 43year old female. No chief complaint on file. HPI:   Patient is seen for follow-up of anxiety and depression, states medication has been working well and mood has been stable, denies any side effects to the medication.  Would like distress  SCALP: fine hair, no bald patches  EYES: PERRLA, EOMI  NECK: supple  LUNGS: clear to auscultation  CARDIO: RRR without murmur  PSYCH: well groomed, appropriate mood and affect, good eye contact, normal speech, no thought disorder.     ASSESSMENT A

## 2019-09-20 ENCOUNTER — OFFICE VISIT (OUTPATIENT)
Dept: OBGYN CLINIC | Facility: CLINIC | Age: 39
End: 2019-09-20
Payer: COMMERCIAL

## 2019-09-20 VITALS
WEIGHT: 178 LBS | HEIGHT: 65.5 IN | DIASTOLIC BLOOD PRESSURE: 86 MMHG | BODY MASS INDEX: 29.3 KG/M2 | SYSTOLIC BLOOD PRESSURE: 124 MMHG | HEART RATE: 76 BPM

## 2019-09-20 DIAGNOSIS — Z30.41 SURVEILLANCE FOR BIRTH CONTROL, ORAL CONTRACEPTIVES: ICD-10-CM

## 2019-09-20 DIAGNOSIS — Z12.4 CERVICAL CANCER SCREENING: ICD-10-CM

## 2019-09-20 DIAGNOSIS — Z01.419 WELL WOMAN EXAM WITH ROUTINE GYNECOLOGICAL EXAM: Primary | ICD-10-CM

## 2019-09-20 PROCEDURE — 99395 PREV VISIT EST AGE 18-39: CPT | Performed by: NURSE PRACTITIONER

## 2019-09-20 PROCEDURE — 87624 HPV HI-RISK TYP POOLED RSLT: CPT | Performed by: NURSE PRACTITIONER

## 2019-09-20 RX ORDER — NORGESTIMATE AND ETHINYL ESTRADIOL 7DAYSX3 28
1 KIT ORAL
Qty: 84 TABLET | Refills: 4 | Status: SHIPPED | OUTPATIENT
Start: 2019-09-20 | End: 2020-09-29

## 2019-09-20 NOTE — PROGRESS NOTES
Here for Routine Annual Exam  No concerns or questions. Menses are regular, no concerns. Contraception- OCP and doing well. No C/O    ROS: No Cardiac, Respiratory, GI,  or Neurological symptoms.     PE:  GENERAL: well developed, well nourished, in

## 2019-09-26 LAB — HPV I/H RISK 1 DNA SPEC QL NAA+PROBE: NEGATIVE

## 2019-09-27 ENCOUNTER — LAB ENCOUNTER (OUTPATIENT)
Dept: LAB | Age: 39
End: 2019-09-27
Attending: FAMILY MEDICINE
Payer: COMMERCIAL

## 2019-09-27 DIAGNOSIS — D50.9 IRON DEFICIENCY ANEMIA, UNSPECIFIED IRON DEFICIENCY ANEMIA TYPE: ICD-10-CM

## 2019-09-27 DIAGNOSIS — E55.9 VITAMIN D INSUFFICIENCY: ICD-10-CM

## 2019-09-27 DIAGNOSIS — E78.2 MIXED HYPERLIPIDEMIA: ICD-10-CM

## 2019-09-27 LAB
BASOPHILS # BLD AUTO: 0.09 X10(3) UL (ref 0–0.2)
BASOPHILS NFR BLD AUTO: 1.5 %
CHOLEST SMN-MCNC: 264 MG/DL (ref ?–200)
DEPRECATED HBV CORE AB SER IA-ACNC: 41.6 NG/ML (ref 12–160)
DEPRECATED RDW RBC AUTO: 46.5 FL (ref 35.1–46.3)
EOSINOPHIL # BLD AUTO: 0.38 X10(3) UL (ref 0–0.7)
EOSINOPHIL NFR BLD AUTO: 6.3 %
ERYTHROCYTE [DISTWIDTH] IN BLOOD BY AUTOMATED COUNT: 13.2 % (ref 11–15)
HCT VFR BLD AUTO: 44.9 % (ref 35–48)
HDLC SERPL-MCNC: 56 MG/DL (ref 40–59)
HGB BLD-MCNC: 13.8 G/DL (ref 12–16)
IMM GRANULOCYTES # BLD AUTO: 0.03 X10(3) UL (ref 0–1)
IMM GRANULOCYTES NFR BLD: 0.5 %
IRON SATURATION: 9 % (ref 15–50)
IRON SERPL-MCNC: 41 UG/DL (ref 50–170)
LDLC SERPL DIRECT ASSAY-MCNC: 139 MG/DL (ref ?–100)
LYMPHOCYTES # BLD AUTO: 1.94 X10(3) UL (ref 1–4)
LYMPHOCYTES NFR BLD AUTO: 32.3 %
MCH RBC QN AUTO: 29.2 PG (ref 26–34)
MCHC RBC AUTO-ENTMCNC: 30.7 G/DL (ref 31–37)
MCV RBC AUTO: 95.1 FL (ref 80–100)
MONOCYTES # BLD AUTO: 0.36 X10(3) UL (ref 0.1–1)
MONOCYTES NFR BLD AUTO: 6 %
NEUTROPHILS # BLD AUTO: 3.2 X10 (3) UL (ref 1.5–7.7)
NEUTROPHILS # BLD AUTO: 3.2 X10(3) UL (ref 1.5–7.7)
NEUTROPHILS NFR BLD AUTO: 53.4 %
NONHDLC SERPL-MCNC: 208 MG/DL (ref ?–130)
PLATELET # BLD AUTO: 326 10(3)UL (ref 150–450)
RBC # BLD AUTO: 4.72 X10(6)UL (ref 3.8–5.3)
TOTAL IRON BINDING CAPACITY: 457 UG/DL (ref 240–450)
TRANSFERRIN SERPL-MCNC: 307 MG/DL (ref 200–360)
TRIGL SERPL-MCNC: 513 MG/DL (ref 30–149)
VIT D+METAB SERPL-MCNC: 17.2 NG/ML (ref 30–100)
WBC # BLD AUTO: 6 X10(3) UL (ref 4–11)

## 2019-09-27 PROCEDURE — 82306 VITAMIN D 25 HYDROXY: CPT | Performed by: FAMILY MEDICINE

## 2019-09-27 PROCEDURE — 80061 LIPID PANEL: CPT | Performed by: FAMILY MEDICINE

## 2019-09-27 PROCEDURE — 83540 ASSAY OF IRON: CPT | Performed by: FAMILY MEDICINE

## 2019-09-27 PROCEDURE — 36415 COLL VENOUS BLD VENIPUNCTURE: CPT | Performed by: FAMILY MEDICINE

## 2019-09-27 PROCEDURE — 83550 IRON BINDING TEST: CPT | Performed by: FAMILY MEDICINE

## 2019-09-27 PROCEDURE — 82728 ASSAY OF FERRITIN: CPT | Performed by: FAMILY MEDICINE

## 2019-09-27 PROCEDURE — 85025 COMPLETE CBC W/AUTO DIFF WBC: CPT | Performed by: FAMILY MEDICINE

## 2019-09-27 PROCEDURE — 83721 ASSAY OF BLOOD LIPOPROTEIN: CPT | Performed by: FAMILY MEDICINE

## 2019-09-30 ENCOUNTER — TELEPHONE (OUTPATIENT)
Dept: FAMILY MEDICINE CLINIC | Facility: CLINIC | Age: 39
End: 2019-09-30

## 2019-09-30 NOTE — TELEPHONE ENCOUNTER
----- Message from Samina Suazo MD sent at 9/30/2019 12:18 AM CDT -----  Patient has very high triglycerides, total cholesterol and LDL cholesterol, iron levels are low, Patient has vitamin D deficiency please advise her to schedule a follow-up appoin

## 2019-10-02 ENCOUNTER — OFFICE VISIT (OUTPATIENT)
Dept: FAMILY MEDICINE CLINIC | Facility: CLINIC | Age: 39
End: 2019-10-02
Payer: COMMERCIAL

## 2019-10-02 VITALS
TEMPERATURE: 98 F | BODY MASS INDEX: 29.3 KG/M2 | HEIGHT: 65.5 IN | DIASTOLIC BLOOD PRESSURE: 84 MMHG | HEART RATE: 70 BPM | RESPIRATION RATE: 18 BRPM | WEIGHT: 178 LBS | SYSTOLIC BLOOD PRESSURE: 122 MMHG | OXYGEN SATURATION: 98 %

## 2019-10-02 DIAGNOSIS — E55.9 VITAMIN D INSUFFICIENCY: ICD-10-CM

## 2019-10-02 DIAGNOSIS — E78.2 MIXED HYPERLIPIDEMIA: Primary | ICD-10-CM

## 2019-10-02 DIAGNOSIS — D50.9 IRON DEFICIENCY ANEMIA, UNSPECIFIED IRON DEFICIENCY ANEMIA TYPE: ICD-10-CM

## 2019-10-02 PROCEDURE — 99213 OFFICE O/P EST LOW 20 MIN: CPT | Performed by: FAMILY MEDICINE

## 2019-10-02 RX ORDER — ERGOCALCIFEROL 1.25 MG/1
50000 CAPSULE ORAL WEEKLY
Qty: 12 CAPSULE | Refills: 0 | Status: SHIPPED | OUTPATIENT
Start: 2019-10-02 | End: 2019-12-31

## 2019-10-02 RX ORDER — FENOFIBRATE 67 MG/1
1 CAPSULE ORAL DAILY
Qty: 90 CAPSULE | Refills: 0 | Status: SHIPPED | OUTPATIENT
Start: 2019-10-02 | End: 2019-12-20

## 2019-10-02 NOTE — PATIENT INSTRUCTIONS
Lifestyle Changes to Control Cholesterol  You can control your cholesterol through diet, exercise, weight management, quitting smoking, stress management, and taking your medicines right. These things can also lower your risk for cardiovascular disease. · Riding a bicycle or stationary bike  · Dancing  Managing your weight  If you are overweight or obese, your healthcare provider will work with you to help you lose weight and lower your BMI (body mass index).  Making diet changes and getting more physical · Don’t skip a dose or stop taking your medicine because you feel better or because your cholesterol numbers go down. Never stop taking your medicine unless your healthcare provider has told you it’s OK.   · Ask your healthcare provider if you have any ques © 3031-9506 [address] [facility], [facility] [place]. All rights reserved. This information is not intended as a substitute for professional medical care. Always follow your healthcare professional's instructions.         Abdi Callaway Unsaturated fats are usually liquid at room temperature. They are better choices for your heart than saturated fat. There are two types of unsaturated fats: polyunsaturated fat and monounsaturated fat.  Aim to replace saturated fats with polyunsaturated or · Total Fat. Tells you how many grams (g) of fat are in 1 serving. · Calories from Fat. This tells you the total number of calories from fat in 1 serving (there are 9 calories per gram of fat). Look for foods with the fewest calories from fat.   · Saturate

## 2019-10-03 DIAGNOSIS — G43.829 MENSTRUAL MIGRAINE WITHOUT STATUS MIGRAINOSUS, NOT INTRACTABLE: ICD-10-CM

## 2019-10-04 RX ORDER — RIZATRIPTAN BENZOATE 10 MG/1
TABLET ORAL
Qty: 8 TABLET | Refills: 5 | Status: SHIPPED | OUTPATIENT
Start: 2019-10-04 | End: 2020-04-27

## 2019-10-11 RX ORDER — NORGESTIMATE AND ETHINYL ESTRADIOL 7DAYSX3 28
KIT ORAL
Qty: 84 TABLET | Refills: 0 | OUTPATIENT
Start: 2019-10-11

## 2019-10-15 NOTE — PROGRESS NOTES
Chelita Flanagan is a 35year old female. Patient presents with:  Test Results    HPI:   Is seen today for follow-up and to discuss her test results.     Diet and exercise: Patient states does not really watch her diet and has not been eating the healthiest drinks      Frequency: Monthly or less      Drinks per session: 1 or 2      Binge frequency: Less than monthly      Comment: OCC, Socially 2-3 beers    Drug use: No       REVIEW OF SYSTEMS:   GENERAL HEALTH: feels well otherwise  SKIN: denies any unusual s Total      <200 mg/dL 264 (H)   HDL Cholesterol      40 - 59 mg/dL 56   Triglycerides      30 - 149 mg/dL 513 (H)   LDL Cholesterol Calc          VLDL          NON HDL CHOL      <130 mg/dL 208 (H)   Iron, Serum      50 - 170 ug/dL 41 (L)   Transferrin

## 2019-10-25 ENCOUNTER — WALK IN (OUTPATIENT)
Dept: URGENT CARE | Age: 39
End: 2019-10-25

## 2019-10-25 VITALS
SYSTOLIC BLOOD PRESSURE: 118 MMHG | BODY MASS INDEX: 29.49 KG/M2 | RESPIRATION RATE: 16 BRPM | DIASTOLIC BLOOD PRESSURE: 68 MMHG | WEIGHT: 177 LBS | TEMPERATURE: 98.2 F | HEART RATE: 78 BPM | HEIGHT: 65 IN

## 2019-10-25 DIAGNOSIS — H10.9 BACTERIAL CONJUNCTIVITIS: Primary | ICD-10-CM

## 2019-10-25 PROCEDURE — 99203 OFFICE O/P NEW LOW 30 MIN: CPT | Performed by: OBSTETRICS & GYNECOLOGY

## 2019-10-25 RX ORDER — NORGESTIMATE AND ETHINYL ESTRADIOL 7DAYSX3 LO
1 KIT ORAL DAILY
COMMUNITY

## 2019-10-25 RX ORDER — FENOFIBRATE 40 MG/1
40 TABLET ORAL
COMMUNITY

## 2019-10-25 RX ORDER — TOBRAMYCIN 3 MG/ML
1 SOLUTION/ DROPS OPHTHALMIC EVERY 6 HOURS
Qty: 5 ML | Refills: 0 | Status: SHIPPED | OUTPATIENT
Start: 2019-10-25 | End: 2019-11-01

## 2019-10-25 ASSESSMENT — VISUAL ACUITY
OD_CC: 20/20
OS_CC: 20/40

## 2019-10-25 ASSESSMENT — ENCOUNTER SYMPTOMS
EYE DISCHARGE: 1
CHILLS: 0
SORE THROAT: 0
DIAPHORESIS: 0
EYE ITCHING: 1
EYE REDNESS: 1
VOMITING: 0
PHOTOPHOBIA: 0
NAUSEA: 0
DIARRHEA: 0
FEVER: 0

## 2019-12-16 DIAGNOSIS — E55.9 VITAMIN D INSUFFICIENCY: ICD-10-CM

## 2019-12-16 RX ORDER — ERGOCALCIFEROL 1.25 MG/1
CAPSULE ORAL
Qty: 12 CAPSULE | Refills: 0 | OUTPATIENT
Start: 2019-12-16

## 2019-12-20 DIAGNOSIS — E78.2 MIXED HYPERLIPIDEMIA: ICD-10-CM

## 2019-12-20 RX ORDER — FENOFIBRATE 67 MG/1
1 CAPSULE ORAL DAILY
Qty: 30 CAPSULE | Refills: 0 | Status: SHIPPED | OUTPATIENT
Start: 2019-12-20 | End: 2019-12-23

## 2019-12-20 NOTE — TELEPHONE ENCOUNTER
Refill #30, please advise patient to recheck her labs and make a follow-up appointment for further refills.

## 2019-12-23 RX ORDER — FENOFIBRATE 67 MG/1
1 CAPSULE ORAL DAILY
Qty: 60 CAPSULE | Refills: 0 | Status: SHIPPED | OUTPATIENT
Start: 2019-12-23 | End: 2020-02-15 | Stop reason: ALTCHOICE

## 2019-12-23 NOTE — TELEPHONE ENCOUNTER
VMML for patient advising refill order has been sent to last until her scheduled appt. Reminded she has fasting labs that should be drawn prior to appt.

## 2019-12-23 NOTE — TELEPHONE ENCOUNTER
No, unless she is out of the country, she needs to be seen in the next 30 days. Please call and schedule.

## 2019-12-23 NOTE — TELEPHONE ENCOUNTER
Last few refill requests are from [id number] [place] [address]. Please advise on refill.  Thank You

## 2019-12-24 ENCOUNTER — OFFICE VISIT (OUTPATIENT)
Dept: HEMATOLOGY/ONCOLOGY | Facility: HOSPITAL | Age: 39
End: 2019-12-24
Attending: INTERNAL MEDICINE
Payer: COMMERCIAL

## 2019-12-24 VITALS
DIASTOLIC BLOOD PRESSURE: 90 MMHG | SYSTOLIC BLOOD PRESSURE: 138 MMHG | RESPIRATION RATE: 16 BRPM | BODY MASS INDEX: 29.79 KG/M2 | OXYGEN SATURATION: 98 % | HEIGHT: 65.5 IN | WEIGHT: 181 LBS | HEART RATE: 74 BPM | TEMPERATURE: 97 F

## 2019-12-24 DIAGNOSIS — D50.9 IRON DEFICIENCY ANEMIA, UNSPECIFIED IRON DEFICIENCY ANEMIA TYPE: Primary | ICD-10-CM

## 2019-12-24 PROCEDURE — 96374 THER/PROPH/DIAG INJ IV PUSH: CPT

## 2019-12-24 NOTE — PATIENT INSTRUCTIONS
Please call [phone] ([phone]) with any questions or concerns Monday through Friday 8:00 to 4:30.     For after hours or weekends/holidays for emergent needs, [phone] will reach the on-call MD.

## 2019-12-24 NOTE — CONSULTS
[facility] Report of Consultation    Patient Name: Michael Rene   YOB: 1980   Medical Record Number: [id number]   CSN: [id number]   Consulting Physician: Marissa Rowe MD  Referring Physician(s): No ref.  provider found  Date of Consu • Hypertension Maternal Grandfather    • Hypertension Paternal Grandfather    • Heart Disease Paternal Grandfather         CAD   • Hypertension Brother    • Heart Disease Brother         LIPID   • Hypertension Brother    • Heart Disease Brother         L and weight loss. Fatigue  Eyes: No visual disturbance, irritation and redness. Ears, nose, mouth, throat, and face: No hearing loss, tinnitus, hoarseness and voice change.   Respiratory: No cough, sputum, hemoptysis, chest pain, wheezing, dyspnea on exerti FERRITIN [E]      IRON AND TIBC [E]      VITAMIN B12    Return for Labs in Apr.        Antonio Orosco M.D.     THE [facility]    [address], [address]    12/24/2019

## 2019-12-24 NOTE — PROGRESS NOTES
Education Record    Learner:  Patient      Barriers / Limitations:  None   Comments:    Method:  Discussion   Comments:    General Topics:  Plan of care reviewed   Comments:    Outcome:  Shows understanding   Comments:    Patient here for Feraheme infusion

## 2019-12-24 NOTE — PROGRESS NOTES
MD consult for AKBAR. Has been on oral iron every other day since Feb 2019. Reports heavy menses every month, lasting approx 5 days. Denies any rectal bleeding or black tarry stools. Scheduled for Feraheme infusion today.      Education Record    Learner:  Pa

## 2019-12-30 ENCOUNTER — OFFICE VISIT (OUTPATIENT)
Dept: HEMATOLOGY/ONCOLOGY | Facility: HOSPITAL | Age: 39
End: 2019-12-30
Attending: INTERNAL MEDICINE
Payer: COMMERCIAL

## 2019-12-30 VITALS
WEIGHT: 180 LBS | OXYGEN SATURATION: 99 % | SYSTOLIC BLOOD PRESSURE: 135 MMHG | DIASTOLIC BLOOD PRESSURE: 88 MMHG | TEMPERATURE: 99 F | BODY MASS INDEX: 29.63 KG/M2 | HEIGHT: 65.51 IN | RESPIRATION RATE: 16 BRPM | HEART RATE: 68 BPM

## 2019-12-30 DIAGNOSIS — D50.9 IRON DEFICIENCY ANEMIA, UNSPECIFIED IRON DEFICIENCY ANEMIA TYPE: Primary | ICD-10-CM

## 2019-12-30 PROCEDURE — 96374 THER/PROPH/DIAG INJ IV PUSH: CPT

## 2019-12-30 NOTE — PROGRESS NOTES
Education Record    Learner:  Patient    Disease / Diagnosis: AKBAR - IV feraheme infusion    Barriers / Limitations:  None   Comments:    Method:  Brief focused and Reinforcement   Comments:    General Topics:  Plan of care reviewed   Comments:    Outcome:

## 2020-01-15 DIAGNOSIS — E78.2 MIXED HYPERLIPIDEMIA: ICD-10-CM

## 2020-01-15 RX ORDER — FENOFIBRATE 67 MG/1
CAPSULE ORAL
Qty: 30 CAPSULE | Refills: 0 | OUTPATIENT
Start: 2020-01-15

## 2020-01-15 NOTE — TELEPHONE ENCOUNTER
LOV 10/2/2019    LAST LAB    LAST RX 12-23-19 60*0    Next OV   Future Appointments   Date Time Provider Peter Kim   2/15/2020  9:20 AM Nilda Sellers MD [facility]         PROTOCOL   Name from pharmacy: FENOFIBRATE 67MG CAPSULES         W 82

## 2020-02-02 DIAGNOSIS — F41.1 GENERALIZED ANXIETY DISORDER: ICD-10-CM

## 2020-02-04 NOTE — TELEPHONE ENCOUNTER
I called and spoke with the patient  - she thinks she has enough until visit and is good if refill given at time of visit. If she will run short she will call and let us know.  I let her know for now we will decline this refill but again if running out

## 2020-02-04 NOTE — TELEPHONE ENCOUNTER
Pt has upcoming visit on the 15th  Last visit 10/2/19    Request is for 90 day with mail order, not sure if you are ok with that or if you prefer to fill at time of visit.

## 2020-02-07 ENCOUNTER — APPOINTMENT (OUTPATIENT)
Dept: LAB | Age: 40
End: 2020-02-07
Attending: FAMILY MEDICINE
Payer: COMMERCIAL

## 2020-02-07 DIAGNOSIS — E78.2 MIXED HYPERLIPIDEMIA: ICD-10-CM

## 2020-02-07 LAB
ALBUMIN SERPL-MCNC: 3.8 G/DL (ref 3.4–5)
ALP LIVER SERPL-CCNC: 61 U/L (ref 37–98)
ALT SERPL-CCNC: 22 U/L (ref 13–56)
AST SERPL-CCNC: 14 U/L (ref 15–37)
BILIRUB DIRECT SERPL-MCNC: <0.1 MG/DL (ref 0–0.2)
BILIRUB SERPL-MCNC: 0.3 MG/DL (ref 0.1–2)
CHOLEST SMN-MCNC: 228 MG/DL (ref ?–200)
HDLC SERPL-MCNC: 63 MG/DL (ref 40–59)
LDLC SERPL CALC-MCNC: 114 MG/DL (ref ?–100)
M PROTEIN MFR SERPL ELPH: 7.4 G/DL (ref 6.4–8.2)
NONHDLC SERPL-MCNC: 165 MG/DL (ref ?–130)
PATIENT FASTING Y/N/NP: YES
TRIGL SERPL-MCNC: 254 MG/DL (ref 30–149)
VLDLC SERPL CALC-MCNC: 51 MG/DL (ref 0–30)

## 2020-02-07 PROCEDURE — 36415 COLL VENOUS BLD VENIPUNCTURE: CPT | Performed by: FAMILY MEDICINE

## 2020-02-07 PROCEDURE — 80061 LIPID PANEL: CPT | Performed by: FAMILY MEDICINE

## 2020-02-07 PROCEDURE — 80076 HEPATIC FUNCTION PANEL: CPT | Performed by: FAMILY MEDICINE

## 2020-02-15 ENCOUNTER — OFFICE VISIT (OUTPATIENT)
Dept: FAMILY MEDICINE CLINIC | Facility: CLINIC | Age: 40
End: 2020-02-15
Payer: COMMERCIAL

## 2020-02-15 VITALS
WEIGHT: 181 LBS | OXYGEN SATURATION: 98 % | TEMPERATURE: 97 F | HEART RATE: 77 BPM | DIASTOLIC BLOOD PRESSURE: 72 MMHG | SYSTOLIC BLOOD PRESSURE: 108 MMHG | HEIGHT: 65.35 IN | BODY MASS INDEX: 29.79 KG/M2 | RESPIRATION RATE: 18 BRPM

## 2020-02-15 DIAGNOSIS — Z12.39 SCREENING FOR MALIGNANT NEOPLASM OF BREAST: ICD-10-CM

## 2020-02-15 DIAGNOSIS — Z80.8 FAMILY HISTORY OF MELANOMA: ICD-10-CM

## 2020-02-15 DIAGNOSIS — Z12.83 SCREENING FOR SKIN CANCER: ICD-10-CM

## 2020-02-15 DIAGNOSIS — F41.1 GENERALIZED ANXIETY DISORDER: ICD-10-CM

## 2020-02-15 DIAGNOSIS — Z00.00 ROUTINE GENERAL MEDICAL EXAMINATION AT A HEALTH CARE FACILITY: Primary | ICD-10-CM

## 2020-02-15 DIAGNOSIS — E78.2 MIXED HYPERLIPIDEMIA: ICD-10-CM

## 2020-02-15 PROCEDURE — 99213 OFFICE O/P EST LOW 20 MIN: CPT | Performed by: FAMILY MEDICINE

## 2020-02-15 PROCEDURE — 99396 PREV VISIT EST AGE 40-64: CPT | Performed by: FAMILY MEDICINE

## 2020-02-15 RX ORDER — FENOFIBRATE 67 MG/1
1 CAPSULE ORAL DAILY
Qty: 90 CAPSULE | Refills: 1 | Status: SHIPPED | OUTPATIENT
Start: 2020-02-15 | End: 2020-07-17

## 2020-02-15 NOTE — PATIENT INSTRUCTIONS
Prevention Guidelines, Women Ages 36 to 52  Screening tests and vaccines are an important part of managing your health. A screening test is done to find diseases in people who don't have any symptoms.  The goal is to find a disease early so lifestyle miranda · Flexible sigmoidoscopy every 5 years, or  · Colonoscopy every 10 years, or  · CT colonography (virtual colonoscopy) every 5 years, or  · Yearly fecal occult blood test, or  · Yearly fecal immunochemical test every year, or  · Stool DNA test, every 3 year Chickenpox (varicella) All women in this age group who have no record of this infection or vaccine 2 doses; the second dose should be given at least 4 weeks after the first dose   Hepatitis A Women at increased risk for infection–talk with your healthcare Use of tobacco and the health effects it can cause All women in this age group Every exam   1 American Diabetes Association  2 American College of Obstetricians and Gynecologists   3 [address]  [address] [facility]  Date Last R · Lower your blood pressure  Your healthcare provider may recommend that you get more physical activity if you haven't been active. Your provider may recommend that you get moderate to vigorous physical activity for at least 40 minutes each day.  You should Healthy eating and exercise are a good start to keeping your cholesterol down. But you may need some extra help from medicine. If your doctor prescribes medicine, be sure to take it exactly as directed.  Remember:  · Tell your healthcare provider about all If you are in a high-risk group, talk with your healthcare provider about your treatment goals. Make sure you understand why these goals are important, based on your own health history and your family history of heart disease or high cholesterol.   Make a p

## 2020-02-15 NOTE — PROGRESS NOTES
Sudhir Chaney is a 35year old female. Patient presents with:  Physical    HPI:   Patient is seen for annual physical.  Saw her GYN for her Pap in September last year.   Diet and exercise: patient states has been watching her diet but has not been exerc Norgestim-Eth Estrad Triphasic (TRINESSA, 28,) 0.18/0.215/0.25 MG-35 MCG Oral Tab Take 1 tablet by mouth once daily. 84 tablet 4   • Calcium Carbonate Antacid (TUMS) 500 MG Oral Chew Tab Chew 2 tablets by mouth 2 (two) times daily.      • Omeprazole-Sodium Birth control/protection: OCP          Caffeine Concern: Yes          diet coke 3 cans a day    REVIEW OF SYSTEMS:   Review of Systems   Constitutional: Negative for appetite change, fatigue, fever and unexpected weight change.    HENT: Negative for congest Effort normal and breath sounds normal. No respiratory distress. She exhibits no tenderness. Abdominal: Soft. Bowel sounds are normal. She exhibits no distension and no mass. There is no tenderness. Musculoskeletal: Normal range of motion.    Nicholas Duggan INTERNAL    Family history of melanoma  -     DERM - INTERNAL

## 2020-04-24 DIAGNOSIS — G43.829 MENSTRUAL MIGRAINE WITHOUT STATUS MIGRAINOSUS, NOT INTRACTABLE: ICD-10-CM

## 2020-04-27 RX ORDER — RIZATRIPTAN BENZOATE 10 MG/1
TABLET ORAL
Qty: 8 TABLET | Refills: 2 | Status: SHIPPED | OUTPATIENT
Start: 2020-04-27 | End: 2020-07-13

## 2020-07-11 DIAGNOSIS — G43.829 MENSTRUAL MIGRAINE WITHOUT STATUS MIGRAINOSUS, NOT INTRACTABLE: ICD-10-CM

## 2020-07-13 RX ORDER — RIZATRIPTAN BENZOATE 10 MG/1
TABLET ORAL
Qty: 8 TABLET | Refills: 0 | Status: SHIPPED | OUTPATIENT
Start: 2020-07-13 | End: 2020-08-12

## 2020-07-13 NOTE — TELEPHONE ENCOUNTER
LOV 2/15/2020    LAST LAB    LAST RX 4-27-20 8*2    Next OV   Future Appointments   Date Time Provider Peter Kim   8/12/2020  6:20 PM Campbell Schirmer, MD [facility]         PROTOCOL  Name from pharmacy: RIZATRIPTAN 10MG TABLETS          Will

## 2020-07-17 DIAGNOSIS — G43.829 MENSTRUAL MIGRAINE WITHOUT STATUS MIGRAINOSUS, NOT INTRACTABLE: ICD-10-CM

## 2020-07-17 DIAGNOSIS — F41.1 GENERALIZED ANXIETY DISORDER: ICD-10-CM

## 2020-07-17 DIAGNOSIS — E78.2 MIXED HYPERLIPIDEMIA: ICD-10-CM

## 2020-07-17 RX ORDER — RIZATRIPTAN BENZOATE 10 MG/1
TABLET ORAL
Qty: 8 TABLET | Refills: 0 | OUTPATIENT
Start: 2020-07-17

## 2020-07-17 RX ORDER — FENOFIBRATE 67 MG/1
1 CAPSULE ORAL DAILY
Qty: 90 CAPSULE | Refills: 0 | Status: SHIPPED | OUTPATIENT
Start: 2020-07-17 | End: 2020-08-12

## 2020-07-17 NOTE — TELEPHONE ENCOUNTER
LOV 2/15/2020    LAST LAB    LAST RX 2-15-20 90*1    Next OV   Future Appointments   Date Time Provider Peter Kim   8/12/2020  6:20 PM Makenna Stephens MD [facility]         PROTOCOL                                                       Name

## 2020-08-12 ENCOUNTER — OFFICE VISIT (OUTPATIENT)
Dept: FAMILY MEDICINE CLINIC | Facility: CLINIC | Age: 40
End: 2020-08-12
Payer: COMMERCIAL

## 2020-08-12 VITALS
OXYGEN SATURATION: 98 % | BODY MASS INDEX: 29.96 KG/M2 | HEART RATE: 77 BPM | TEMPERATURE: 97 F | DIASTOLIC BLOOD PRESSURE: 80 MMHG | HEIGHT: 65.35 IN | SYSTOLIC BLOOD PRESSURE: 110 MMHG | RESPIRATION RATE: 18 BRPM | WEIGHT: 182 LBS

## 2020-08-12 DIAGNOSIS — F41.1 GENERALIZED ANXIETY DISORDER: ICD-10-CM

## 2020-08-12 DIAGNOSIS — E78.2 MIXED HYPERLIPIDEMIA: ICD-10-CM

## 2020-08-12 DIAGNOSIS — G43.829 MENSTRUAL MIGRAINE WITHOUT STATUS MIGRAINOSUS, NOT INTRACTABLE: ICD-10-CM

## 2020-08-12 PROCEDURE — 3074F SYST BP LT 130 MM HG: CPT | Performed by: FAMILY MEDICINE

## 2020-08-12 PROCEDURE — 3079F DIAST BP 80-89 MM HG: CPT | Performed by: FAMILY MEDICINE

## 2020-08-12 PROCEDURE — 99214 OFFICE O/P EST MOD 30 MIN: CPT | Performed by: FAMILY MEDICINE

## 2020-08-12 PROCEDURE — 3008F BODY MASS INDEX DOCD: CPT | Performed by: FAMILY MEDICINE

## 2020-08-12 RX ORDER — RIZATRIPTAN BENZOATE 10 MG/1
10 TABLET ORAL AS NEEDED
Qty: 8 TABLET | Refills: 3 | Status: SHIPPED | OUTPATIENT
Start: 2020-08-12 | End: 2020-10-30

## 2020-08-12 RX ORDER — FENOFIBRATE 67 MG/1
1 CAPSULE ORAL DAILY
Qty: 90 CAPSULE | Refills: 1 | Status: SHIPPED | OUTPATIENT
Start: 2020-08-12 | End: 2021-01-19

## 2020-08-12 NOTE — PROGRESS NOTES
Carmen Lima is a 35year old female. Patient presents with: Anxiety    HPI:   Carmen Lima is a 35year old female seen for follow up and medication refill, shama anxiety has been well controlled with the current dose of medication.  Denies any anthony REVIEW OF SYSTEMS:   GENERAL HEALTH: feels well otherwise  SKIN: denies any unusual skin lesions or rashes  RESPIRATORY: denies shortness of breath with exertion  CARDIOVASCULAR: denies chest pain on exertion  GI: denies abdominal pain and denies heart

## 2020-08-12 NOTE — TELEPHONE ENCOUNTER
Name from pharmacy: SERTRALINE 50MG TABLETS         Will file in chart as: SERTRALINE HCL 50 MG Oral Tab         Sig: TAKE 1 TABLET BY MOUTH DAILY    Disp:  30 tablet    Refills:  0 (Pharmacy requested: Not specified)    Start: 8/12/2020    Class: Normal

## 2020-08-24 ENCOUNTER — TELEPHONE (OUTPATIENT)
Dept: FAMILY MEDICINE CLINIC | Facility: CLINIC | Age: 40
End: 2020-08-24

## 2020-09-17 ENCOUNTER — LAB ENCOUNTER (OUTPATIENT)
Dept: LAB | Age: 40
End: 2020-09-17
Attending: FAMILY MEDICINE
Payer: COMMERCIAL

## 2020-09-17 DIAGNOSIS — E78.2 MIXED HYPERLIPIDEMIA: ICD-10-CM

## 2020-09-17 DIAGNOSIS — D50.9 IRON DEFICIENCY ANEMIA, UNSPECIFIED IRON DEFICIENCY ANEMIA TYPE: ICD-10-CM

## 2020-09-17 DIAGNOSIS — Z00.00 ROUTINE GENERAL MEDICAL EXAMINATION AT A HEALTH CARE FACILITY: ICD-10-CM

## 2020-09-17 LAB
ALBUMIN SERPL-MCNC: 3.6 G/DL (ref 3.4–5)
ALBUMIN/GLOB SERPL: 0.9 {RATIO} (ref 1–2)
ALP LIVER SERPL-CCNC: 72 U/L (ref 37–98)
ALT SERPL-CCNC: 23 U/L (ref 13–56)
ANION GAP SERPL CALC-SCNC: 5 MMOL/L (ref 0–18)
AST SERPL-CCNC: 3 U/L (ref 15–37)
BASOPHILS # BLD AUTO: 0.08 X10(3) UL (ref 0–0.2)
BASOPHILS NFR BLD AUTO: 1 %
BILIRUB SERPL-MCNC: 0.3 MG/DL (ref 0.1–2)
BUN BLD-MCNC: 10 MG/DL (ref 7–18)
BUN/CREAT SERPL: 11 (ref 10–20)
CALCIUM BLD-MCNC: 9.2 MG/DL (ref 8.5–10.1)
CHLORIDE SERPL-SCNC: 102 MMOL/L (ref 98–112)
CHOLEST SMN-MCNC: 246 MG/DL (ref ?–200)
CO2 SERPL-SCNC: 26 MMOL/L (ref 21–32)
CREAT BLD-MCNC: 0.91 MG/DL (ref 0.55–1.02)
DEPRECATED HBV CORE AB SER IA-ACNC: 202.6 NG/ML (ref 12–240)
DEPRECATED RDW RBC AUTO: 46.5 FL (ref 35.1–46.3)
EOSINOPHIL # BLD AUTO: 0.25 X10(3) UL (ref 0–0.7)
EOSINOPHIL NFR BLD AUTO: 3.1 %
ERYTHROCYTE [DISTWIDTH] IN BLOOD BY AUTOMATED COUNT: 13.7 % (ref 11–15)
GLOBULIN PLAS-MCNC: 4 G/DL (ref 2.8–4.4)
GLUCOSE BLD-MCNC: 85 MG/DL (ref 70–99)
HCT VFR BLD AUTO: 43.5 % (ref 35–48)
HDLC SERPL-MCNC: 61 MG/DL (ref 40–59)
HGB BLD-MCNC: 13.7 G/DL (ref 12–16)
IMM GRANULOCYTES # BLD AUTO: 0.02 X10(3) UL (ref 0–1)
IMM GRANULOCYTES NFR BLD: 0.2 %
IRON SATURATION: 15 % (ref 15–50)
IRON SERPL-MCNC: 65 UG/DL (ref 50–170)
LDLC SERPL DIRECT ASSAY-MCNC: 124 MG/DL (ref ?–100)
LYMPHOCYTES # BLD AUTO: 2.56 X10(3) UL (ref 1–4)
LYMPHOCYTES NFR BLD AUTO: 31.3 %
M PROTEIN MFR SERPL ELPH: 7.6 G/DL (ref 6.4–8.2)
MCH RBC QN AUTO: 29.1 PG (ref 26–34)
MCHC RBC AUTO-ENTMCNC: 31.5 G/DL (ref 31–37)
MCV RBC AUTO: 92.6 FL (ref 80–100)
MONOCYTES # BLD AUTO: 0.4 X10(3) UL (ref 0.1–1)
MONOCYTES NFR BLD AUTO: 4.9 %
NEUTROPHILS # BLD AUTO: 4.86 X10 (3) UL (ref 1.5–7.7)
NEUTROPHILS # BLD AUTO: 4.86 X10(3) UL (ref 1.5–7.7)
NEUTROPHILS NFR BLD AUTO: 59.5 %
NONHDLC SERPL-MCNC: 185 MG/DL (ref ?–130)
OSMOLALITY SERPL CALC.SUM OF ELEC: 274 MOSM/KG (ref 275–295)
PATIENT FASTING Y/N/NP: YES
PATIENT FASTING Y/N/NP: YES
PLATELET # BLD AUTO: 347 10(3)UL (ref 150–450)
POTASSIUM SERPL-SCNC: 3.9 MMOL/L (ref 3.5–5.1)
RBC # BLD AUTO: 4.7 X10(6)UL (ref 3.8–5.3)
SODIUM SERPL-SCNC: 133 MMOL/L (ref 136–145)
TOTAL IRON BINDING CAPACITY: 447 UG/DL (ref 240–450)
TRANSFERRIN SERPL-MCNC: 300 MG/DL (ref 200–360)
TRIGL SERPL-MCNC: 437 MG/DL (ref 30–149)
TSI SER-ACNC: 1.18 MIU/ML (ref 0.36–3.74)
VIT B12 SERPL-MCNC: 431 PG/ML (ref 193–986)
WBC # BLD AUTO: 8.2 X10(3) UL (ref 4–11)

## 2020-09-17 PROCEDURE — 80061 LIPID PANEL: CPT | Performed by: FAMILY MEDICINE

## 2020-09-17 PROCEDURE — 80053 COMPREHEN METABOLIC PANEL: CPT | Performed by: FAMILY MEDICINE

## 2020-09-17 PROCEDURE — 83721 ASSAY OF BLOOD LIPOPROTEIN: CPT | Performed by: FAMILY MEDICINE

## 2020-09-17 PROCEDURE — 84443 ASSAY THYROID STIM HORMONE: CPT | Performed by: FAMILY MEDICINE

## 2020-09-17 PROCEDURE — 36415 COLL VENOUS BLD VENIPUNCTURE: CPT | Performed by: FAMILY MEDICINE

## 2020-09-18 DIAGNOSIS — E78.2 MIXED HYPERLIPIDEMIA: Primary | ICD-10-CM

## 2020-09-22 DIAGNOSIS — Z30.41 SURVEILLANCE FOR BIRTH CONTROL, ORAL CONTRACEPTIVES: ICD-10-CM

## 2020-09-29 RX ORDER — NORGESTIMATE AND ETHINYL ESTRADIOL 7DAYSX3 28
KIT ORAL
Qty: 28 TABLET | Refills: 0 | Status: SHIPPED | OUTPATIENT
Start: 2020-09-29 | End: 2020-10-19

## 2020-09-29 NOTE — TELEPHONE ENCOUNTER
Pt scheduled    Future Appointments   Date Time Provider Peter Kim   10/19/2020  1:30 PM ELIEL Barrow [facility] [place]   2/3/2021  5:20 PM Eboni Strong MD [facility]

## 2020-09-30 DIAGNOSIS — F41.1 GENERALIZED ANXIETY DISORDER: ICD-10-CM

## 2020-09-30 NOTE — TELEPHONE ENCOUNTER
LOV    LAST LAB     LAST RX   Medication Quantity Refills Start End   Sertraline HCl 50 MG Oral Tab 90 tablet 1 8/12/2020    Sig:   Take 1 tablet (50 mg total) by mouth daily.            Next OV   Future Appointments   Date Time Provider Peter Kim

## 2020-10-03 DIAGNOSIS — E78.2 MIXED HYPERLIPIDEMIA: ICD-10-CM

## 2020-10-05 RX ORDER — FENOFIBRATE 67 MG/1
CAPSULE ORAL
Qty: 90 CAPSULE | Refills: 1 | OUTPATIENT
Start: 2020-10-05

## 2020-10-19 ENCOUNTER — OFFICE VISIT (OUTPATIENT)
Dept: OBGYN CLINIC | Facility: CLINIC | Age: 40
End: 2020-10-19
Payer: COMMERCIAL

## 2020-10-19 VITALS
BODY MASS INDEX: 30.39 KG/M2 | SYSTOLIC BLOOD PRESSURE: 112 MMHG | WEIGHT: 182.38 LBS | HEART RATE: 64 BPM | DIASTOLIC BLOOD PRESSURE: 68 MMHG | HEIGHT: 65 IN

## 2020-10-19 DIAGNOSIS — Z01.419 WELL WOMAN EXAM WITH ROUTINE GYNECOLOGICAL EXAM: Primary | ICD-10-CM

## 2020-10-19 DIAGNOSIS — Z30.41 SURVEILLANCE FOR BIRTH CONTROL, ORAL CONTRACEPTIVES: ICD-10-CM

## 2020-10-19 PROCEDURE — 99396 PREV VISIT EST AGE 40-64: CPT | Performed by: NURSE PRACTITIONER

## 2020-10-19 PROCEDURE — 3074F SYST BP LT 130 MM HG: CPT | Performed by: NURSE PRACTITIONER

## 2020-10-19 PROCEDURE — 3078F DIAST BP <80 MM HG: CPT | Performed by: NURSE PRACTITIONER

## 2020-10-19 PROCEDURE — 3008F BODY MASS INDEX DOCD: CPT | Performed by: NURSE PRACTITIONER

## 2020-10-19 RX ORDER — NORGESTIMATE AND ETHINYL ESTRADIOL 7DAYSX3 28
1 KIT ORAL DAILY
Qty: 84 TABLET | Refills: 4 | Status: SHIPPED | OUTPATIENT
Start: 2020-10-19 | End: 2021-10-26

## 2020-10-19 NOTE — PROGRESS NOTES
Here for Routine Annual Exam  No concerns or questions. Menses are regular, no concerns. Contraception- OCP and doing well. No C/O, OK to skip pap this year. Got mammogram order from her PCP.     ROS: No Cardiac, Respiratory, GI,  or Neurological s

## 2020-10-30 DIAGNOSIS — G43.829 MENSTRUAL MIGRAINE WITHOUT STATUS MIGRAINOSUS, NOT INTRACTABLE: ICD-10-CM

## 2020-11-02 RX ORDER — RIZATRIPTAN BENZOATE 10 MG/1
10 TABLET ORAL AS NEEDED
Qty: 8 TABLET | Refills: 3 | Status: SHIPPED | OUTPATIENT
Start: 2020-11-02 | End: 2021-02-15

## 2020-11-21 DIAGNOSIS — G43.829 MENSTRUAL MIGRAINE WITHOUT STATUS MIGRAINOSUS, NOT INTRACTABLE: ICD-10-CM

## 2020-11-24 RX ORDER — RIZATRIPTAN BENZOATE 10 MG/1
TABLET ORAL
Qty: 8 TABLET | Refills: 3 | OUTPATIENT
Start: 2020-11-24

## 2020-11-24 NOTE — TELEPHONE ENCOUNTER
LOV 8/12/2020    LAST LAB    LAST RX   11-2  Next OV   Future Appointments   Date Time Provider Peter Kim   2/3/2021  5:20 PM Indira Clifford MD [facility]         PROTOCOL  Name from pharmacy: RIZATRIPTAN 10MG TABLETS          Will file in

## 2021-01-19 ENCOUNTER — PATIENT MESSAGE (OUTPATIENT)
Dept: FAMILY MEDICINE CLINIC | Facility: CLINIC | Age: 41
End: 2021-01-19

## 2021-01-19 DIAGNOSIS — E78.2 MIXED HYPERLIPIDEMIA: ICD-10-CM

## 2021-01-19 RX ORDER — FENOFIBRATE 67 MG/1
2 CAPSULE ORAL DAILY
Qty: 180 CAPSULE | Refills: 0 | Status: SHIPPED | OUTPATIENT
Start: 2021-01-19 | End: 2021-02-15

## 2021-01-19 NOTE — TELEPHONE ENCOUNTER
From: Charles   To: Heather Dowling MD  Sent: 1/19/2021 8:04 AM CST  Subject: Visit Follow-up Question    I forgot to start taking my fenofibrate 2x a day until recently. Can I get 3 more months on it to take lab again?

## 2021-01-19 NOTE — TELEPHONE ENCOUNTER
Noted  Viewed by Evgeny Peralta on 1/19/2021  8:02 AM  Written by Denny Ellington MD on 9/18/2020  3:03 PM  Dyana Solorio,   Your triglycerides are very high in the 400s again, I would like you to increase the dose of your fenofibrate 67 mg to 2 tablets homer

## 2021-01-28 DIAGNOSIS — F41.1 GENERALIZED ANXIETY DISORDER: ICD-10-CM

## 2021-01-28 NOTE — TELEPHONE ENCOUNTER
LOV 8/12/2020    LAST LAB    LAST RX 8-12-20 90*1    Next OV   Future Appointments   Date Time Provider Peter Kim   2/3/2021  5:20 PM Amish Collins MD [facility]         PROTOCOL  Name from pharmacy: SERTRALINE 50MG TABLETS          Will

## 2021-01-31 ENCOUNTER — TELEPHONE (OUTPATIENT)
Dept: FAMILY MEDICINE CLINIC | Facility: CLINIC | Age: 41
End: 2021-01-31

## 2021-01-31 DIAGNOSIS — F41.1 GENERALIZED ANXIETY DISORDER: ICD-10-CM

## 2021-02-01 NOTE — TELEPHONE ENCOUNTER
LOV 8/12/2020    LAST LAB    LAST RX    Next OV   Future Appointments   Date Time Provider Peter Kim   2/3/2021  5:20 PM Denny Ellington MD [facility]         PROTOCOL  Name from pharmacy: SERTRALINE 50MG TABLETS          Will file in chart

## 2021-02-09 NOTE — TELEPHONE ENCOUNTER
Called patient and advised there are labs pending from Dr. Anh James and Dr. Mala Ji. Instructed needs to be fasting, call Central Scheduling to make OP lab appt.

## 2021-02-09 NOTE — TELEPHONE ENCOUNTER
Scheduled pt for 6 month f/u     Pt asking if she needs to complete labs before this appt and if she needs any additional? Please advise.      Future Appointments   Date Time Provider Peter Judy   2/15/2021 10:40 AM Patricia Segura MD [facility]

## 2021-02-10 ENCOUNTER — LAB ENCOUNTER (OUTPATIENT)
Dept: LAB | Age: 41
End: 2021-02-10
Attending: FAMILY MEDICINE
Payer: COMMERCIAL

## 2021-02-10 DIAGNOSIS — E78.2 MIXED HYPERLIPIDEMIA: ICD-10-CM

## 2021-02-10 DIAGNOSIS — D50.9 IRON DEFICIENCY ANEMIA, UNSPECIFIED IRON DEFICIENCY ANEMIA TYPE: ICD-10-CM

## 2021-02-10 LAB
ALBUMIN SERPL-MCNC: 3.7 G/DL (ref 3.4–5)
ALBUMIN/GLOB SERPL: 1 {RATIO} (ref 1–2)
ALP LIVER SERPL-CCNC: 72 U/L
ALT SERPL-CCNC: 16 U/L
ANION GAP SERPL CALC-SCNC: 4 MMOL/L (ref 0–18)
AST SERPL-CCNC: 10 U/L (ref 15–37)
BASOPHILS # BLD AUTO: 0.1 X10(3) UL (ref 0–0.2)
BASOPHILS NFR BLD AUTO: 1.5 %
BILIRUB SERPL-MCNC: 0.3 MG/DL (ref 0.1–2)
BUN BLD-MCNC: 9 MG/DL (ref 7–18)
BUN/CREAT SERPL: 10.5 (ref 10–20)
CALCIUM BLD-MCNC: 9.5 MG/DL (ref 8.5–10.1)
CHLORIDE SERPL-SCNC: 104 MMOL/L (ref 98–112)
CHOLEST SMN-MCNC: 239 MG/DL (ref ?–200)
CO2 SERPL-SCNC: 28 MMOL/L (ref 21–32)
CREAT BLD-MCNC: 0.86 MG/DL
DEPRECATED HBV CORE AB SER IA-ACNC: 146.6 NG/ML
DEPRECATED RDW RBC AUTO: 45.5 FL (ref 35.1–46.3)
EOSINOPHIL # BLD AUTO: 0.18 X10(3) UL (ref 0–0.7)
EOSINOPHIL NFR BLD AUTO: 2.6 %
ERYTHROCYTE [DISTWIDTH] IN BLOOD BY AUTOMATED COUNT: 13.6 % (ref 11–15)
GLOBULIN PLAS-MCNC: 3.7 G/DL (ref 2.8–4.4)
GLUCOSE BLD-MCNC: 94 MG/DL (ref 70–99)
HCT VFR BLD AUTO: 46.4 %
HDLC SERPL-MCNC: 57 MG/DL (ref 40–59)
HGB BLD-MCNC: 14.5 G/DL
IMM GRANULOCYTES # BLD AUTO: 0.01 X10(3) UL (ref 0–1)
IMM GRANULOCYTES NFR BLD: 0.1 %
IRON SATURATION: 12 %
IRON SERPL-MCNC: 60 UG/DL
LDLC SERPL CALC-MCNC: 112 MG/DL (ref ?–100)
LYMPHOCYTES # BLD AUTO: 2.56 X10(3) UL (ref 1–4)
LYMPHOCYTES NFR BLD AUTO: 37.4 %
M PROTEIN MFR SERPL ELPH: 7.4 G/DL (ref 6.4–8.2)
MCH RBC QN AUTO: 28.4 PG (ref 26–34)
MCHC RBC AUTO-ENTMCNC: 31.3 G/DL (ref 31–37)
MCV RBC AUTO: 91 FL
MONOCYTES # BLD AUTO: 0.36 X10(3) UL (ref 0.1–1)
MONOCYTES NFR BLD AUTO: 5.3 %
NEUTROPHILS # BLD AUTO: 3.64 X10 (3) UL (ref 1.5–7.7)
NEUTROPHILS # BLD AUTO: 3.64 X10(3) UL (ref 1.5–7.7)
NEUTROPHILS NFR BLD AUTO: 53.1 %
NONHDLC SERPL-MCNC: 182 MG/DL (ref ?–130)
OSMOLALITY SERPL CALC.SUM OF ELEC: 280 MOSM/KG (ref 275–295)
PATIENT FASTING Y/N/NP: YES
PATIENT FASTING Y/N/NP: YES
PLATELET # BLD AUTO: 378 10(3)UL (ref 150–450)
POTASSIUM SERPL-SCNC: 5 MMOL/L (ref 3.5–5.1)
RBC # BLD AUTO: 5.1 X10(6)UL
SODIUM SERPL-SCNC: 136 MMOL/L (ref 136–145)
TOTAL IRON BINDING CAPACITY: 484 UG/DL (ref 240–450)
TRANSFERRIN SERPL-MCNC: 325 MG/DL (ref 200–360)
TRIGL SERPL-MCNC: 350 MG/DL (ref 30–149)
VIT B12 SERPL-MCNC: 437 PG/ML (ref 193–986)
VLDLC SERPL CALC-MCNC: 70 MG/DL (ref 0–30)
WBC # BLD AUTO: 6.9 X10(3) UL (ref 4–11)

## 2021-02-10 PROCEDURE — 80061 LIPID PANEL: CPT | Performed by: FAMILY MEDICINE

## 2021-02-10 PROCEDURE — 80053 COMPREHEN METABOLIC PANEL: CPT | Performed by: FAMILY MEDICINE

## 2021-02-11 ENCOUNTER — TELEPHONE (OUTPATIENT)
Dept: HEMATOLOGY/ONCOLOGY | Facility: HOSPITAL | Age: 41
End: 2021-02-11

## 2021-02-11 NOTE — TELEPHONE ENCOUNTER
Nash Paniagua MD  P [facility]             Call, Hb ok, fe bit low. Can start oral iron every other day and check labs 4 months. If not better at that time, can repeat I.V iron      Reviewed the above.    Reviewed that oral iron can cause consti

## 2021-02-15 ENCOUNTER — OFFICE VISIT (OUTPATIENT)
Dept: FAMILY MEDICINE CLINIC | Facility: CLINIC | Age: 41
End: 2021-02-15
Payer: COMMERCIAL

## 2021-02-15 VITALS
OXYGEN SATURATION: 98 % | RESPIRATION RATE: 16 BRPM | HEART RATE: 82 BPM | DIASTOLIC BLOOD PRESSURE: 80 MMHG | BODY MASS INDEX: 30.99 KG/M2 | HEIGHT: 65 IN | TEMPERATURE: 99 F | WEIGHT: 186 LBS | SYSTOLIC BLOOD PRESSURE: 122 MMHG

## 2021-02-15 DIAGNOSIS — E78.2 MIXED HYPERLIPIDEMIA: Primary | ICD-10-CM

## 2021-02-15 DIAGNOSIS — D50.9 IRON DEFICIENCY ANEMIA, UNSPECIFIED IRON DEFICIENCY ANEMIA TYPE: ICD-10-CM

## 2021-02-15 DIAGNOSIS — G43.829 MENSTRUAL MIGRAINE WITHOUT STATUS MIGRAINOSUS, NOT INTRACTABLE: ICD-10-CM

## 2021-02-15 DIAGNOSIS — Z12.31 ENCOUNTER FOR SCREENING MAMMOGRAM FOR MALIGNANT NEOPLASM OF BREAST: ICD-10-CM

## 2021-02-15 DIAGNOSIS — F41.1 GENERALIZED ANXIETY DISORDER: ICD-10-CM

## 2021-02-15 DIAGNOSIS — R00.2 PALPITATIONS: ICD-10-CM

## 2021-02-15 PROCEDURE — 93000 ELECTROCARDIOGRAM COMPLETE: CPT | Performed by: FAMILY MEDICINE

## 2021-02-15 PROCEDURE — 99214 OFFICE O/P EST MOD 30 MIN: CPT | Performed by: FAMILY MEDICINE

## 2021-02-15 PROCEDURE — 3079F DIAST BP 80-89 MM HG: CPT | Performed by: FAMILY MEDICINE

## 2021-02-15 PROCEDURE — 3008F BODY MASS INDEX DOCD: CPT | Performed by: FAMILY MEDICINE

## 2021-02-15 PROCEDURE — 3074F SYST BP LT 130 MM HG: CPT | Performed by: FAMILY MEDICINE

## 2021-02-15 RX ORDER — FENOFIBRATE 67 MG/1
2 CAPSULE ORAL DAILY
Qty: 180 CAPSULE | Refills: 1 | Status: SHIPPED | OUTPATIENT
Start: 2021-02-15 | End: 2021-05-16

## 2021-02-15 RX ORDER — RIZATRIPTAN BENZOATE 10 MG/1
10 TABLET ORAL AS NEEDED
Qty: 8 TABLET | Refills: 3 | Status: SHIPPED | OUTPATIENT
Start: 2021-02-15 | End: 2021-05-10

## 2021-02-15 NOTE — PROGRESS NOTES
Librado Pace is a 38year old female. Patient presents with: Anxiety  Lipids: medictaion f/u. HPI:   Librado Pace is a 38year old female seen foe follow up and medication refill.     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   Advised patient to try taking over the counter Magnesium 400-500 mg daily as prophylaxis for her migraine, can

## 2021-02-15 NOTE — PATIENT INSTRUCTIONS
Lifestyle Changes to Control Cholesterol  You can control your cholesterol through diet, exercise, weight management, quitting smoking, stress management, and taking your medicines right. These things can also lower your risk for cardiovascular disease. arts  · Tennis  · Riding a bicycle or stationary bike  · Dancing  Managing your weight  If you are overweight or obese, your healthcare provider will work with you to help you lose weight and lower your BMI (body mass index).  Making diet changes and gettin (jaundice), abdominal pain, and headache. · Don’t skip a dose or stop taking your medicine because you feel better or because your cholesterol numbers go down. Never stop taking your medicine unless your healthcare provider has told you it’s OK.   · Ask yo test. Also ask your provider about any side effects your medicines may cause. Let your provider know about any side effects you have. You may need to take more than one medicine to reach the cholesterol goals that you and your provider decide on.    Moo Nathan

## 2021-02-18 DIAGNOSIS — F41.1 GENERALIZED ANXIETY DISORDER: ICD-10-CM

## 2021-02-18 NOTE — TELEPHONE ENCOUNTER
LOV 2/15/21    LAST LAB 2/10/21     LAST RX   Sertraline HCl 50 MG Oral Tab 90 tablet 1 2/15/2021    Sig:   Take 1 tablet (50 mg total) by mouth daily. Next OV No future appointments. PROTOCOL NONE .  DENIED AS DUPLICATE, INSTRUCTIONS TO PHARMA

## 2021-03-15 ENCOUNTER — PATIENT MESSAGE (OUTPATIENT)
Dept: FAMILY MEDICINE CLINIC | Facility: CLINIC | Age: 41
End: 2021-03-15

## 2021-03-16 NOTE — TELEPHONE ENCOUNTER
From: Daniel Padilla  To: Al Moran MD  Sent: 3/15/2021 8:56 PM CDT  Subject: Fanny Belle Dr.,    My fenofibrate prescription still indicates 1x day.  I know we fixed this in your office but it is still listed incorrectly at [facility]

## 2021-03-21 DIAGNOSIS — E78.2 MIXED HYPERLIPIDEMIA: ICD-10-CM

## 2021-03-22 RX ORDER — FENOFIBRATE 67 MG/1
2 CAPSULE ORAL DAILY
Qty: 180 CAPSULE | Refills: 1 | OUTPATIENT
Start: 2021-03-22 | End: 2021-06-20

## 2021-03-22 NOTE — TELEPHONE ENCOUNTER
LOV 2/15/2021    LAST LAB    LAST RX    Next OV No future appointments. PROTOCOL   Fenofibrate 67 MG Oral Cap         Sig: Take 2 capsules by mouth daily.     Disp:  180 capsule    Refills:  1    Start: 3/21/2021 - 6/19/2021    Class: Normal    Non-formu

## 2021-04-29 DIAGNOSIS — E78.2 MIXED HYPERLIPIDEMIA: ICD-10-CM

## 2021-04-29 RX ORDER — FENOFIBRATE 67 MG/1
CAPSULE ORAL
Qty: 90 CAPSULE | Refills: 0 | OUTPATIENT
Start: 2021-04-29

## 2021-05-08 DIAGNOSIS — G43.829 MENSTRUAL MIGRAINE WITHOUT STATUS MIGRAINOSUS, NOT INTRACTABLE: ICD-10-CM

## 2021-05-10 RX ORDER — RIZATRIPTAN BENZOATE 10 MG/1
TABLET ORAL
Qty: 8 TABLET | Refills: 3 | Status: SHIPPED | OUTPATIENT
Start: 2021-05-10 | End: 2021-11-12

## 2021-05-10 NOTE — TELEPHONE ENCOUNTER
LOV 2/15/2021    LAST LAB    LAST RX 2-15-21 8 tabs *3refill    Next OV No future appointments.     PROTOCOL   Name from pharmacy: RIZATRIPTAN 10MG TABLETS         Will file in chart as: RIZATRIPTAN BENZOATE 10 MG Oral Tab    Sig: TAKE 1 TABLET BY MOUTH AS

## 2021-05-13 DIAGNOSIS — E78.2 MIXED HYPERLIPIDEMIA: ICD-10-CM

## 2021-05-14 NOTE — TELEPHONE ENCOUNTER
Medication Quantity Refills Start End   Fenofibrate 67 MG Oral Cap 180 capsule 1 2/15/2021 5/16/2021     Patient should have a refill, please check with her pharmacy

## 2021-05-15 RX ORDER — FENOFIBRATE 67 MG/1
2 CAPSULE ORAL DAILY
Qty: 180 CAPSULE | Refills: 1 | OUTPATIENT
Start: 2021-05-15 | End: 2021-08-13

## 2021-05-18 ENCOUNTER — TELEPHONE (OUTPATIENT)
Dept: FAMILY MEDICINE CLINIC | Facility: CLINIC | Age: 41
End: 2021-05-18

## 2021-05-18 NOTE — TELEPHONE ENCOUNTER
Your information has been submitted to [employer]. [employer] is reviewing the PA request and you will receive an electronic response.  You may check for the updated outcome later by reopening this request. The standard fax determination will also be sen

## 2021-05-21 ENCOUNTER — HOSPITAL ENCOUNTER (OUTPATIENT)
Dept: MAMMOGRAPHY | Age: 41
Discharge: HOME | End: 2021-05-21
Attending: FAMILY MEDICINE
Payer: COMMERCIAL

## 2021-05-21 DIAGNOSIS — Z12.31 ENCOUNTER FOR SCREENING MAMMOGRAM FOR MALIGNANT NEOPLASM OF BREAST: ICD-10-CM

## 2021-05-21 PROCEDURE — 77063 BREAST TOMOSYNTHESIS BI: CPT | Performed by: FAMILY MEDICINE

## 2021-05-21 PROCEDURE — 77067 SCR MAMMO BI INCL CAD: CPT | Performed by: FAMILY MEDICINE

## 2021-05-26 ENCOUNTER — HOSPITAL ENCOUNTER (OUTPATIENT)
Dept: MAMMOGRAPHY | Facility: HOSPITAL | Age: 41
Discharge: HOME | End: 2021-05-26
Attending: FAMILY MEDICINE
Payer: COMMERCIAL

## 2021-05-26 DIAGNOSIS — R92.2 INCONCLUSIVE MAMMOGRAM: ICD-10-CM

## 2021-05-26 PROCEDURE — 77065 DX MAMMO INCL CAD UNI: CPT | Performed by: FAMILY MEDICINE

## 2021-05-26 PROCEDURE — 76642 ULTRASOUND BREAST LIMITED: CPT | Performed by: FAMILY MEDICINE

## 2021-05-26 PROCEDURE — 77061 BREAST TOMOSYNTHESIS UNI: CPT | Performed by: FAMILY MEDICINE

## 2021-05-27 ENCOUNTER — TELEPHONE (OUTPATIENT)
Dept: FAMILY MEDICINE CLINIC | Facility: CLINIC | Age: 41
End: 2021-05-27

## 2021-05-27 NOTE — TELEPHONE ENCOUNTER
Called patient and advised of result note per Dr. Damian Craig.     Iva Tse MD   5/26/2021  3:33 PM CDT       Diagnostic mammogram of left breast recommended in 6 months

## 2021-06-01 ENCOUNTER — PATIENT MESSAGE (OUTPATIENT)
Dept: FAMILY MEDICINE CLINIC | Facility: CLINIC | Age: 41
End: 2021-06-01

## 2021-06-02 NOTE — TELEPHONE ENCOUNTER
From: Julio Gimenez  To: Phoebe Mitchell MD  Sent: 6/1/2021 1:30 PM CDT  Subject: Prescription Question    Hello Doctor--    I rec'd information about my cholesterol medication from my insurance. They will not pay for the script as it is written now.  José Carpenter

## 2021-06-03 RX ORDER — FENOFIBRATE 134 MG/1
1 CAPSULE ORAL DAILY
Qty: 30 CAPSULE | Refills: 0 | Status: SHIPPED | OUTPATIENT
Start: 2021-06-03 | End: 2022-01-02

## 2021-06-03 NOTE — TELEPHONE ENCOUNTER
Cholesterol Medication Protocol [id number]/03/2021 03:02 PM   ALT < 80 Protocol Details    ALT resulted within past year     Lipid panel within past 12 months     Appointment within past 12 or next 3 months       LOV 2/15/21     LAST LAB  2/10/21     LAST RX 6/3/21 30 caps     Next OV No future appointments.       PROTOCOL pass

## 2021-06-04 DIAGNOSIS — F41.1 GENERALIZED ANXIETY DISORDER: ICD-10-CM

## 2021-06-04 RX ORDER — FENOFIBRATE 134 MG/1
1 CAPSULE ORAL DAILY
Qty: 90 CAPSULE | Refills: 0 | OUTPATIENT
Start: 2021-06-04 | End: 2021-07-04

## 2021-06-04 NOTE — TELEPHONE ENCOUNTER
LOV    LAST LAB    LAST RX   Sertraline HCl 50 MG Oral Tab 90 tablet 1 2/15/2021    Sig:   Take 1 tablet (50 mg total) by mouth daily. Route:   Oral           Next OV No future appointments. PROTOCOL NONE .  DENIED AS DUPLICATE, INSTRUCTIONS TO PHARM

## 2021-10-22 ENCOUNTER — TELEPHONE (OUTPATIENT)
Dept: OBGYN CLINIC | Facility: CLINIC | Age: 41
End: 2021-10-22

## 2021-10-22 DIAGNOSIS — Z30.41 SURVEILLANCE FOR BIRTH CONTROL, ORAL CONTRACEPTIVES: ICD-10-CM

## 2021-10-22 NOTE — TELEPHONE ENCOUNTER
Last OV: 10/19/20 with Mylo Fabry for annual  Last refill date: 10/19/20  Follow-up: 1 year  Next appt.: none scheduled; due 10/2021    Patient due for annual. Please contact her to schedule appt and then return to RN pool for refill.  Thank you

## 2021-10-26 RX ORDER — NORGESTIMATE AND ETHINYL ESTRADIOL 7DAYSX3 28
1 KIT ORAL DAILY
Qty: 84 TABLET | Refills: 0 | Status: SHIPPED | OUTPATIENT
Start: 2021-10-26 | End: 2022-01-07

## 2021-10-26 NOTE — TELEPHONE ENCOUNTER
Pt scheduled    Future Appointments   Date Time Provider Peter Judy   12/1/2021  8:00 AM Thelma Denis MD [facility]

## 2021-10-30 DIAGNOSIS — F41.1 GENERALIZED ANXIETY DISORDER: ICD-10-CM

## 2021-11-01 NOTE — TELEPHONE ENCOUNTER
LOV 2/15/2021    LAST LAB    LAST RX 2-15-21 90*1    Next OV.   Future Appointments   Date Time Provider Peter Judy   12/1/2021  8:00 AM Yaima Katz MD [facility]         PROTOCOL  Name from pharmacy: SERTRALINE 50MG TABLETS

## 2021-11-02 DIAGNOSIS — G43.829 MENSTRUAL MIGRAINE WITHOUT STATUS MIGRAINOSUS, NOT INTRACTABLE: ICD-10-CM

## 2021-11-02 RX ORDER — RIZATRIPTAN BENZOATE 10 MG/1
TABLET ORAL
Qty: 8 TABLET | Refills: 3 | Status: CANCELLED | OUTPATIENT
Start: 2021-11-02

## 2021-11-02 NOTE — TELEPHONE ENCOUNTER
LOV 2/15/2021    LAST LAB    LAST RX 5-10-21 8tabs *3    Next OV   Future Appointments   Date Time Provider Peter Judy   12/1/2021  8:00 AM oJyce Barry MD [facility]         PROTOCOL  Name from pharmacy: RIZATRIPTAN 10MG TABLETS

## 2021-11-12 RX ORDER — RIZATRIPTAN BENZOATE 10 MG/1
TABLET ORAL
Qty: 8 TABLET | Refills: 0 | Status: SHIPPED | OUTPATIENT
Start: 2021-11-12

## 2021-11-17 DIAGNOSIS — G43.829 MENSTRUAL MIGRAINE WITHOUT STATUS MIGRAINOSUS, NOT INTRACTABLE: ICD-10-CM

## 2021-11-17 RX ORDER — RIZATRIPTAN BENZOATE 10 MG/1
TABLET ORAL
Qty: 8 TABLET | Refills: 0 | OUTPATIENT
Start: 2021-11-17

## 2021-11-17 NOTE — TELEPHONE ENCOUNTER
LOV 2/15/2021    LAST LAB    LAST RX    Next OV   Future Appointments   Date Time Provider Peter Kim   12/1/2021  8:00 AM Jesse Calderon MD [facility] [place]   12/10/2021 11:40 AM Danielle Graham MD [facility]         PROTOCOL  Name

## 2021-12-08 DIAGNOSIS — F41.1 GENERALIZED ANXIETY DISORDER: ICD-10-CM

## 2021-12-10 ENCOUNTER — OFFICE VISIT (OUTPATIENT)
Dept: FAMILY MEDICINE CLINIC | Facility: CLINIC | Age: 41
End: 2021-12-10
Payer: COMMERCIAL

## 2021-12-10 VITALS
DIASTOLIC BLOOD PRESSURE: 86 MMHG | WEIGHT: 186 LBS | HEIGHT: 65 IN | RESPIRATION RATE: 18 BRPM | BODY MASS INDEX: 30.99 KG/M2 | OXYGEN SATURATION: 98 % | TEMPERATURE: 97 F | HEART RATE: 67 BPM | SYSTOLIC BLOOD PRESSURE: 120 MMHG

## 2021-12-10 DIAGNOSIS — G43.829 MENSTRUAL MIGRAINE WITHOUT STATUS MIGRAINOSUS, NOT INTRACTABLE: Primary | ICD-10-CM

## 2021-12-10 DIAGNOSIS — E78.2 MIXED HYPERLIPIDEMIA: ICD-10-CM

## 2021-12-10 DIAGNOSIS — F41.1 GENERALIZED ANXIETY DISORDER: ICD-10-CM

## 2021-12-10 PROCEDURE — 99213 OFFICE O/P EST LOW 20 MIN: CPT | Performed by: FAMILY MEDICINE

## 2021-12-10 PROCEDURE — 3074F SYST BP LT 130 MM HG: CPT | Performed by: FAMILY MEDICINE

## 2021-12-10 PROCEDURE — 3008F BODY MASS INDEX DOCD: CPT | Performed by: FAMILY MEDICINE

## 2021-12-10 PROCEDURE — 3079F DIAST BP 80-89 MM HG: CPT | Performed by: FAMILY MEDICINE

## 2021-12-10 RX ORDER — FENOFIBRATE 134 MG/1
1 CAPSULE ORAL DAILY
Qty: 30 CAPSULE | Refills: 0 | Status: CANCELLED | OUTPATIENT
Start: 2021-12-10 | End: 2022-01-09

## 2021-12-10 NOTE — PROGRESS NOTES
Andrew Hammond is a 42year old female. No chief complaint on file. HPI:   Andrew Hammond is a 38year old female seen foe follow up and medication refill. Anxiety: Patient states mood has been stable with the current dose of sertraline.   Denies a Use: Never used    Alcohol use:  Yes      Alcohol/week: 0.0 standard drinks      Comment: OCC, Socially 2-3 beers    Drug use: No       REVIEW OF SYSTEMS:   GENERAL HEALTH: feels well otherwise  RESPIRATORY: denies shortness of breath with exertion  CARDIOV

## 2021-12-10 NOTE — PATIENT INSTRUCTIONS
Your cholesterol and triglyceride levels are high we will start you on medication and have you recheck labs in 6 months. Please continue to limit saturated fats and cholesterol in diet and exercise.     Please continue to monitor your headaches, since you

## 2021-12-17 ENCOUNTER — LABORATORY ENCOUNTER (OUTPATIENT)
Dept: LAB | Age: 41
End: 2021-12-17
Attending: FAMILY MEDICINE
Payer: COMMERCIAL

## 2021-12-17 DIAGNOSIS — E78.2 MIXED HYPERLIPIDEMIA: ICD-10-CM

## 2021-12-17 PROCEDURE — 80061 LIPID PANEL: CPT | Performed by: FAMILY MEDICINE

## 2021-12-17 PROCEDURE — 80053 COMPREHEN METABOLIC PANEL: CPT | Performed by: FAMILY MEDICINE

## 2022-01-07 ENCOUNTER — TELEPHONE (OUTPATIENT)
Dept: OBGYN CLINIC | Facility: CLINIC | Age: 42
End: 2022-01-07

## 2022-01-07 DIAGNOSIS — Z30.41 SURVEILLANCE FOR BIRTH CONTROL, ORAL CONTRACEPTIVES: ICD-10-CM

## 2022-01-07 RX ORDER — NORGESTIMATE AND ETHINYL ESTRADIOL 7DAYSX3 28
1 KIT ORAL DAILY
Qty: 84 TABLET | Refills: 0 | Status: SHIPPED | OUTPATIENT
Start: 2022-01-07

## 2022-01-07 NOTE — TELEPHONE ENCOUNTER
Last OV: 10/19/19 with Mirta Ellington for annual  Last refill date: 10/26/21  Follow-up: 1 year  Next appt. : 2/11/22    Refill sent

## 2022-01-25 NOTE — TELEPHONE ENCOUNTER
Please have patient schedule a 6 month follow up and will send a partial refill Expiration Date (Optional): 12/31/2022

## 2022-05-05 RX ORDER — RIZATRIPTAN BENZOATE 10 MG/1
10 TABLET ORAL AS NEEDED
Qty: 8 TABLET | Refills: 0 | Status: SHIPPED | OUTPATIENT
Start: 2022-05-05

## 2022-05-06 DIAGNOSIS — F41.1 GENERALIZED ANXIETY DISORDER: ICD-10-CM

## 2022-06-22 ENCOUNTER — LAB ENCOUNTER (OUTPATIENT)
Dept: LAB | Age: 42
End: 2022-06-22
Attending: FAMILY MEDICINE
Payer: COMMERCIAL

## 2022-06-22 DIAGNOSIS — E78.2 MIXED HYPERLIPIDEMIA: ICD-10-CM

## 2022-06-22 LAB
ALBUMIN SERPL-MCNC: 4.1 G/DL (ref 3.4–5)
ALBUMIN/GLOB SERPL: 1.2 {RATIO} (ref 1–2)
ALP LIVER SERPL-CCNC: 58 U/L
ALT SERPL-CCNC: 24 U/L
ANION GAP SERPL CALC-SCNC: 5 MMOL/L (ref 0–18)
AST SERPL-CCNC: 18 U/L (ref 15–37)
BILIRUB SERPL-MCNC: 0.3 MG/DL (ref 0.1–2)
BUN BLD-MCNC: 8 MG/DL (ref 7–18)
CALCIUM BLD-MCNC: 9.5 MG/DL (ref 8.5–10.1)
CHLORIDE SERPL-SCNC: 106 MMOL/L (ref 98–112)
CHOLEST SERPL-MCNC: 174 MG/DL (ref ?–200)
CO2 SERPL-SCNC: 26 MMOL/L (ref 21–32)
CREAT BLD-MCNC: 0.89 MG/DL
FASTING PATIENT LIPID ANSWER: YES
FASTING STATUS PATIENT QL REPORTED: YES
GLOBULIN PLAS-MCNC: 3.3 G/DL (ref 2.8–4.4)
GLUCOSE BLD-MCNC: 86 MG/DL (ref 70–99)
HDLC SERPL-MCNC: 60 MG/DL (ref 40–59)
LDLC SERPL CALC-MCNC: 97 MG/DL (ref ?–100)
NONHDLC SERPL-MCNC: 114 MG/DL (ref ?–130)
OSMOLALITY SERPL CALC.SUM OF ELEC: 282 MOSM/KG (ref 275–295)
POTASSIUM SERPL-SCNC: 4.2 MMOL/L (ref 3.5–5.1)
PROT SERPL-MCNC: 7.4 G/DL (ref 6.4–8.2)
SODIUM SERPL-SCNC: 137 MMOL/L (ref 136–145)
TRIGL SERPL-MCNC: 95 MG/DL (ref 30–149)
VLDLC SERPL CALC-MCNC: 16 MG/DL (ref 0–30)

## 2022-06-22 PROCEDURE — 80053 COMPREHEN METABOLIC PANEL: CPT | Performed by: FAMILY MEDICINE

## 2022-06-22 PROCEDURE — 80061 LIPID PANEL: CPT | Performed by: FAMILY MEDICINE

## 2022-07-08 DIAGNOSIS — Z30.41 SURVEILLANCE FOR BIRTH CONTROL, ORAL CONTRACEPTIVES: ICD-10-CM

## 2022-07-08 RX ORDER — NORGESTIMATE AND ETHINYL ESTRADIOL 7DAYSX3 28
KIT ORAL
Qty: 84 TABLET | Refills: 0 | Status: SHIPPED | OUTPATIENT
Start: 2022-07-08

## 2022-08-26 DIAGNOSIS — G43.829 MENSTRUAL MIGRAINE WITHOUT STATUS MIGRAINOSUS, NOT INTRACTABLE: ICD-10-CM

## 2022-08-26 DIAGNOSIS — E78.2 MIXED HYPERLIPIDEMIA: ICD-10-CM

## 2022-08-26 RX ORDER — FENOFIBRATE 134 MG/1
CAPSULE ORAL
Qty: 90 CAPSULE | Refills: 1 | OUTPATIENT
Start: 2022-08-26

## 2022-08-26 RX ORDER — RIZATRIPTAN BENZOATE 10 MG/1
TABLET ORAL
Qty: 8 TABLET | Refills: 2 | Status: SHIPPED | OUTPATIENT
Start: 2022-08-26

## 2022-09-10 ENCOUNTER — OFFICE VISIT (OUTPATIENT)
Dept: OBGYN CLINIC | Facility: CLINIC | Age: 42
End: 2022-09-10
Payer: COMMERCIAL

## 2022-09-10 VITALS
SYSTOLIC BLOOD PRESSURE: 116 MMHG | HEART RATE: 73 BPM | HEIGHT: 65 IN | DIASTOLIC BLOOD PRESSURE: 78 MMHG | WEIGHT: 183.5 LBS | BODY MASS INDEX: 30.57 KG/M2

## 2022-09-10 DIAGNOSIS — Z01.419 WELL WOMAN EXAM WITH ROUTINE GYNECOLOGICAL EXAM: Primary | ICD-10-CM

## 2022-09-10 DIAGNOSIS — Z30.41 SURVEILLANCE FOR BIRTH CONTROL, ORAL CONTRACEPTIVES: ICD-10-CM

## 2022-09-10 DIAGNOSIS — Z12.31 ENCOUNTER FOR SCREENING MAMMOGRAM FOR BREAST CANCER: ICD-10-CM

## 2022-09-10 DIAGNOSIS — Z12.4 CERVICAL CANCER SCREENING: ICD-10-CM

## 2022-09-10 PROCEDURE — 3078F DIAST BP <80 MM HG: CPT | Performed by: NURSE PRACTITIONER

## 2022-09-10 PROCEDURE — 99396 PREV VISIT EST AGE 40-64: CPT | Performed by: NURSE PRACTITIONER

## 2022-09-10 PROCEDURE — 3074F SYST BP LT 130 MM HG: CPT | Performed by: NURSE PRACTITIONER

## 2022-09-10 PROCEDURE — 3008F BODY MASS INDEX DOCD: CPT | Performed by: NURSE PRACTITIONER

## 2022-09-10 RX ORDER — NORGESTIMATE AND ETHINYL ESTRADIOL 7DAYSX3 28
1 KIT ORAL DAILY
Qty: 84 TABLET | Refills: 4 | Status: SHIPPED | OUTPATIENT
Start: 2022-09-10

## 2022-09-23 LAB — HPV I/H RISK 1 DNA SPEC QL NAA+PROBE: NEGATIVE

## 2022-11-06 DIAGNOSIS — F41.1 GENERALIZED ANXIETY DISORDER: ICD-10-CM

## 2022-11-07 DIAGNOSIS — G43.829 MENSTRUAL MIGRAINE WITHOUT STATUS MIGRAINOSUS, NOT INTRACTABLE: ICD-10-CM

## 2022-11-09 RX ORDER — RIZATRIPTAN BENZOATE 10 MG/1
TABLET ORAL
Qty: 8 TABLET | Refills: 2 | Status: SHIPPED | OUTPATIENT
Start: 2022-11-09

## 2023-01-07 DIAGNOSIS — G43.829 MENSTRUAL MIGRAINE WITHOUT STATUS MIGRAINOSUS, NOT INTRACTABLE: ICD-10-CM

## 2023-01-09 RX ORDER — RIZATRIPTAN BENZOATE 10 MG/1
TABLET ORAL
Qty: 8 TABLET | Refills: 2 | Status: SHIPPED | OUTPATIENT
Start: 2023-01-09

## 2023-02-27 DIAGNOSIS — G43.829 MENSTRUAL MIGRAINE WITHOUT STATUS MIGRAINOSUS, NOT INTRACTABLE: ICD-10-CM

## 2023-03-01 RX ORDER — RIZATRIPTAN BENZOATE 10 MG/1
TABLET ORAL
Qty: 8 TABLET | Refills: 2 | Status: SHIPPED | OUTPATIENT
Start: 2023-03-01

## 2023-04-16 DIAGNOSIS — G43.829 MENSTRUAL MIGRAINE WITHOUT STATUS MIGRAINOSUS, NOT INTRACTABLE: ICD-10-CM

## 2023-04-18 RX ORDER — RIZATRIPTAN BENZOATE 10 MG/1
TABLET ORAL
Qty: 8 TABLET | Refills: 2 | OUTPATIENT
Start: 2023-04-18

## 2023-04-21 ENCOUNTER — OFFICE VISIT (OUTPATIENT)
Dept: INTERNAL MEDICINE CLINIC | Facility: CLINIC | Age: 43
End: 2023-04-21
Payer: COMMERCIAL

## 2023-04-21 VITALS
HEART RATE: 76 BPM | RESPIRATION RATE: 16 BRPM | HEIGHT: 65 IN | WEIGHT: 186 LBS | BODY MASS INDEX: 30.99 KG/M2 | DIASTOLIC BLOOD PRESSURE: 86 MMHG | OXYGEN SATURATION: 98 % | SYSTOLIC BLOOD PRESSURE: 108 MMHG

## 2023-04-21 DIAGNOSIS — F41.1 GENERALIZED ANXIETY DISORDER: ICD-10-CM

## 2023-04-21 DIAGNOSIS — F50.81 BINGE EATING DISORDER: ICD-10-CM

## 2023-04-21 DIAGNOSIS — Z51.81 ENCOUNTER FOR THERAPEUTIC DRUG LEVEL MONITORING: Primary | ICD-10-CM

## 2023-04-21 DIAGNOSIS — E66.9 CLASS 1 OBESITY WITH SERIOUS COMORBIDITY AND BODY MASS INDEX (BMI) OF 30.0 TO 30.9 IN ADULT, UNSPECIFIED OBESITY TYPE: ICD-10-CM

## 2023-04-21 DIAGNOSIS — E78.2 MIXED HYPERLIPIDEMIA: ICD-10-CM

## 2023-04-21 PROCEDURE — 3008F BODY MASS INDEX DOCD: CPT | Performed by: PHYSICIAN ASSISTANT

## 2023-04-21 PROCEDURE — 3074F SYST BP LT 130 MM HG: CPT | Performed by: PHYSICIAN ASSISTANT

## 2023-04-21 PROCEDURE — 3079F DIAST BP 80-89 MM HG: CPT | Performed by: PHYSICIAN ASSISTANT

## 2023-04-21 PROCEDURE — 99204 OFFICE O/P NEW MOD 45 MIN: CPT | Performed by: PHYSICIAN ASSISTANT

## 2023-04-21 RX ORDER — FENOFIBRATE 134 MG/1
CAPSULE ORAL
COMMUNITY
Start: 2023-04-19

## 2023-04-28 ENCOUNTER — HOSPITAL ENCOUNTER (OUTPATIENT)
Dept: MAMMOGRAPHY | Age: 43
Discharge: HOME | End: 2023-04-28
Attending: NURSE PRACTITIONER
Payer: COMMERCIAL

## 2023-04-28 DIAGNOSIS — Z12.31 ENCOUNTER FOR SCREENING MAMMOGRAM FOR BREAST CANCER: ICD-10-CM

## 2023-04-28 PROCEDURE — 77063 BREAST TOMOSYNTHESIS BI: CPT | Performed by: NURSE PRACTITIONER

## 2023-04-28 PROCEDURE — 77067 SCR MAMMO BI INCL CAD: CPT | Performed by: NURSE PRACTITIONER

## 2023-04-29 ENCOUNTER — NURSE ONLY (OUTPATIENT)
Dept: LAB | Age: 43
End: 2023-04-29
Attending: PHYSICIAN ASSISTANT
Payer: COMMERCIAL

## 2023-04-29 DIAGNOSIS — E78.2 MIXED HYPERLIPIDEMIA: ICD-10-CM

## 2023-04-29 DIAGNOSIS — Z51.81 ENCOUNTER FOR THERAPEUTIC DRUG LEVEL MONITORING: ICD-10-CM

## 2023-04-29 DIAGNOSIS — F50.81 BINGE EATING DISORDER: ICD-10-CM

## 2023-04-29 DIAGNOSIS — F41.1 GENERALIZED ANXIETY DISORDER: ICD-10-CM

## 2023-04-29 DIAGNOSIS — E66.9 CLASS 1 OBESITY WITH SERIOUS COMORBIDITY AND BODY MASS INDEX (BMI) OF 30.0 TO 30.9 IN ADULT, UNSPECIFIED OBESITY TYPE: ICD-10-CM

## 2023-04-29 LAB
ATRIAL RATE: 78 BPM
P AXIS: 61 DEGREES
P-R INTERVAL: 146 MS
Q-T INTERVAL: 358 MS
QRS DURATION: 72 MS
QTC CALCULATION (BEZET): 408 MS
R AXIS: 34 DEGREES
T AXIS: 107 DEGREES
VENTRICULAR RATE: 78 BPM

## 2023-04-29 PROCEDURE — 93010 ELECTROCARDIOGRAM REPORT: CPT | Performed by: INTERNAL MEDICINE

## 2023-04-29 PROCEDURE — 93005 ELECTROCARDIOGRAM TRACING: CPT

## 2023-05-12 DIAGNOSIS — F41.1 GENERALIZED ANXIETY DISORDER: ICD-10-CM

## 2023-05-15 RX ORDER — FENOFIBRATE 134 MG/1
CAPSULE ORAL
Qty: 30 CAPSULE | Refills: 0 | Status: SHIPPED | OUTPATIENT
Start: 2023-05-15

## 2023-06-09 RX ORDER — FENOFIBRATE 134 MG/1
CAPSULE ORAL
Qty: 30 CAPSULE | Refills: 0 | Status: SHIPPED | OUTPATIENT
Start: 2023-06-09

## 2023-06-09 NOTE — TELEPHONE ENCOUNTER
Cholesterol Medication Protocol Passed 06/09/2023 09:35 AM   Protocol Details  ALT < 80    ALT resulted within past year    Lipid panel within past 12 months    Appointment within past 12 or next 3 months        LOV 12/6/22     LAST LAB  6/22/22    LAST RX  5/15/23 30     Next OV   Future Appointments   Date Time Provider Peter Kim   6/30/2023 11:40 AM Samuel Joy MD [facility]   9/8/2023  7:40 AM Jake Garrison PA-C [facility]         PROTOCOL pass

## 2023-06-17 ENCOUNTER — PATIENT MESSAGE (OUTPATIENT)
Dept: INTERNAL MEDICINE CLINIC | Facility: CLINIC | Age: 43
End: 2023-06-17

## 2023-06-19 NOTE — TELEPHONE ENCOUNTER
From: Radha Chan  To: Zeinab Nogueira PA-C  Sent: 6/17/2023 7:19 PM CDT  Subject: Vyvanse dosage     Hi-    It has been a month on the 40mg of Vyvanse and it doesn't seem to be working. Can we increase to 50mg?

## 2023-06-19 NOTE — TELEPHONE ENCOUNTER
Requesting vyvanse increase  LOV: 4/21/23  RTC: 8-12 weeks  Last Relevant Labs: na  Filled: 6/17/23 #30 with 0 refills vyvanse 40 mg last [place]20[place]23 [place]    Patient would like increase. Patient informed she must schedule. Will you allow increase?

## 2023-06-23 ENCOUNTER — LABORATORY ENCOUNTER (OUTPATIENT)
Dept: LAB | Age: 43
End: 2023-06-23
Attending: FAMILY MEDICINE
Payer: COMMERCIAL

## 2023-06-23 DIAGNOSIS — E55.9 VITAMIN D DEFICIENCY: ICD-10-CM

## 2023-06-23 DIAGNOSIS — E78.2 MIXED HYPERLIPIDEMIA: ICD-10-CM

## 2023-06-23 DIAGNOSIS — Z00.00 LABORATORY EXAM ORDERED AS PART OF ROUTINE GENERAL MEDICAL EXAMINATION: ICD-10-CM

## 2023-06-23 LAB
ALBUMIN SERPL-MCNC: 3.8 G/DL (ref 3.4–5)
ALBUMIN/GLOB SERPL: 1.1 {RATIO} (ref 1–2)
ALP LIVER SERPL-CCNC: 74 U/L
ALT SERPL-CCNC: 20 U/L
ANION GAP SERPL CALC-SCNC: 4 MMOL/L (ref 0–18)
AST SERPL-CCNC: 21 U/L (ref 15–37)
BASOPHILS # BLD AUTO: 0.08 X10(3) UL (ref 0–0.2)
BASOPHILS NFR BLD AUTO: 1 %
BILIRUB SERPL-MCNC: 0.2 MG/DL (ref 0.1–2)
BUN BLD-MCNC: 10 MG/DL (ref 7–18)
CALCIUM BLD-MCNC: 9.1 MG/DL (ref 8.5–10.1)
CHLORIDE SERPL-SCNC: 106 MMOL/L (ref 98–112)
CHOLEST SERPL-MCNC: 255 MG/DL (ref ?–200)
CO2 SERPL-SCNC: 26 MMOL/L (ref 21–32)
CREAT BLD-MCNC: 0.79 MG/DL
EOSINOPHIL # BLD AUTO: 0.49 X10(3) UL (ref 0–0.7)
EOSINOPHIL NFR BLD AUTO: 6.4 %
ERYTHROCYTE [DISTWIDTH] IN BLOOD BY AUTOMATED COUNT: 14 %
FASTING PATIENT LIPID ANSWER: YES
FASTING STATUS PATIENT QL REPORTED: YES
GFR SERPLBLD BASED ON 1.73 SQ M-ARVRAT: 95 ML/MIN/1.73M2 (ref 60–?)
GLOBULIN PLAS-MCNC: 3.6 G/DL (ref 2.8–4.4)
GLUCOSE BLD-MCNC: 88 MG/DL (ref 70–99)
HCT VFR BLD AUTO: 44.3 %
HDLC SERPL-MCNC: 64 MG/DL (ref 40–59)
HGB BLD-MCNC: 13.8 G/DL
IMM GRANULOCYTES # BLD AUTO: 0.02 X10(3) UL (ref 0–1)
IMM GRANULOCYTES NFR BLD: 0.3 %
LDLC SERPL CALC-MCNC: 155 MG/DL (ref ?–100)
LYMPHOCYTES # BLD AUTO: 3.14 X10(3) UL (ref 1–4)
LYMPHOCYTES NFR BLD AUTO: 41.2 %
MCH RBC QN AUTO: 28.3 PG (ref 26–34)
MCHC RBC AUTO-ENTMCNC: 31.2 G/DL (ref 31–37)
MCV RBC AUTO: 90.8 FL
MONOCYTES # BLD AUTO: 0.4 X10(3) UL (ref 0.1–1)
MONOCYTES NFR BLD AUTO: 5.2 %
NEUTROPHILS # BLD AUTO: 3.49 X10 (3) UL (ref 1.5–7.7)
NEUTROPHILS # BLD AUTO: 3.49 X10(3) UL (ref 1.5–7.7)
NEUTROPHILS NFR BLD AUTO: 45.9 %
NONHDLC SERPL-MCNC: 191 MG/DL (ref ?–130)
OSMOLALITY SERPL CALC.SUM OF ELEC: 280 MOSM/KG (ref 275–295)
PLATELET # BLD AUTO: 374 10(3)UL (ref 150–450)
POTASSIUM SERPL-SCNC: 3.9 MMOL/L (ref 3.5–5.1)
PROT SERPL-MCNC: 7.4 G/DL (ref 6.4–8.2)
RBC # BLD AUTO: 4.88 X10(6)UL
SODIUM SERPL-SCNC: 136 MMOL/L (ref 136–145)
TRIGL SERPL-MCNC: 201 MG/DL (ref 30–149)
TSI SER-ACNC: 1.25 MIU/ML (ref 0.36–3.74)
VIT D+METAB SERPL-MCNC: 34.2 NG/ML (ref 30–100)
VLDLC SERPL CALC-MCNC: 39 MG/DL (ref 0–30)
WBC # BLD AUTO: 7.6 X10(3) UL (ref 4–11)

## 2023-06-23 PROCEDURE — 84443 ASSAY THYROID STIM HORMONE: CPT

## 2023-06-23 PROCEDURE — 36415 COLL VENOUS BLD VENIPUNCTURE: CPT

## 2023-06-23 PROCEDURE — 80053 COMPREHEN METABOLIC PANEL: CPT

## 2023-06-23 PROCEDURE — 82306 VITAMIN D 25 HYDROXY: CPT

## 2023-06-23 PROCEDURE — 80061 LIPID PANEL: CPT

## 2023-06-23 PROCEDURE — 85025 COMPLETE CBC W/AUTO DIFF WBC: CPT

## 2023-06-30 ENCOUNTER — OFFICE VISIT (OUTPATIENT)
Dept: FAMILY MEDICINE CLINIC | Facility: CLINIC | Age: 43
End: 2023-06-30
Payer: COMMERCIAL

## 2023-06-30 VITALS
SYSTOLIC BLOOD PRESSURE: 118 MMHG | RESPIRATION RATE: 18 BRPM | DIASTOLIC BLOOD PRESSURE: 82 MMHG | HEIGHT: 65 IN | TEMPERATURE: 98 F | HEART RATE: 83 BPM | OXYGEN SATURATION: 98 % | WEIGHT: 179 LBS | BODY MASS INDEX: 29.82 KG/M2

## 2023-06-30 DIAGNOSIS — F41.1 GENERALIZED ANXIETY DISORDER: ICD-10-CM

## 2023-06-30 DIAGNOSIS — G43.829 MENSTRUAL MIGRAINE WITHOUT STATUS MIGRAINOSUS, NOT INTRACTABLE: ICD-10-CM

## 2023-06-30 DIAGNOSIS — E78.2 MIXED HYPERLIPIDEMIA: Primary | ICD-10-CM

## 2023-06-30 PROCEDURE — 99214 OFFICE O/P EST MOD 30 MIN: CPT | Performed by: FAMILY MEDICINE

## 2023-06-30 PROCEDURE — 3079F DIAST BP 80-89 MM HG: CPT | Performed by: FAMILY MEDICINE

## 2023-06-30 PROCEDURE — 3008F BODY MASS INDEX DOCD: CPT | Performed by: FAMILY MEDICINE

## 2023-06-30 PROCEDURE — 3074F SYST BP LT 130 MM HG: CPT | Performed by: FAMILY MEDICINE

## 2023-06-30 RX ORDER — RIZATRIPTAN BENZOATE 10 MG/1
10 TABLET ORAL AS NEEDED
Qty: 8 TABLET | Refills: 2 | Status: SHIPPED | OUTPATIENT
Start: 2023-06-30

## 2023-06-30 RX ORDER — FENOFIBRATE 134 MG/1
134 CAPSULE ORAL DAILY
Qty: 90 CAPSULE | Refills: 1 | Status: SHIPPED | OUTPATIENT
Start: 2023-06-30

## 2023-07-06 DIAGNOSIS — E78.2 MIXED HYPERLIPIDEMIA: ICD-10-CM

## 2023-07-06 RX ORDER — FENOFIBRATE 134 MG/1
CAPSULE ORAL
Qty: 30 CAPSULE | Refills: 0 | OUTPATIENT
Start: 2023-07-06

## 2023-08-16 DIAGNOSIS — Z30.41 SURVEILLANCE FOR BIRTH CONTROL, ORAL CONTRACEPTIVES: ICD-10-CM

## 2023-08-16 RX ORDER — NORGESTIMATE AND ETHINYL ESTRADIOL 7DAYSX3 28
KIT ORAL
Qty: 84 TABLET | Refills: 4 | OUTPATIENT
Start: 2023-08-16

## 2023-08-16 NOTE — TELEPHONE ENCOUNTER
Last OV: 09/10/2022  Last refill date: 09/10/2022  Follow-up: Annual  Next appt.: None    Patient will be due for annual.

## 2023-08-31 DIAGNOSIS — G43.829 MENSTRUAL MIGRAINE WITHOUT STATUS MIGRAINOSUS, NOT INTRACTABLE: ICD-10-CM

## 2023-08-31 RX ORDER — RIZATRIPTAN BENZOATE 10 MG/1
10 TABLET ORAL AS NEEDED
Qty: 8 TABLET | Refills: 2 | OUTPATIENT
Start: 2023-08-31

## 2023-09-05 ENCOUNTER — TELEMEDICINE (OUTPATIENT)
Dept: INTERNAL MEDICINE CLINIC | Facility: CLINIC | Age: 43
End: 2023-09-05
Payer: COMMERCIAL

## 2023-09-05 DIAGNOSIS — Z51.81 ENCOUNTER FOR THERAPEUTIC DRUG LEVEL MONITORING: Primary | ICD-10-CM

## 2023-09-05 DIAGNOSIS — E66.9 CLASS 1 OBESITY WITH SERIOUS COMORBIDITY AND BODY MASS INDEX (BMI) OF 30.0 TO 30.9 IN ADULT, UNSPECIFIED OBESITY TYPE: ICD-10-CM

## 2023-09-05 DIAGNOSIS — F50.81 BINGE EATING DISORDER: ICD-10-CM

## 2023-09-05 DIAGNOSIS — E78.2 MIXED HYPERLIPIDEMIA: ICD-10-CM

## 2023-09-05 PROCEDURE — 99213 OFFICE O/P EST LOW 20 MIN: CPT | Performed by: PHYSICIAN ASSISTANT

## 2023-09-05 RX ORDER — TOPIRAMATE 25 MG/1
25 TABLET ORAL DAILY
Qty: 90 TABLET | Refills: 0 | Status: SHIPPED | OUTPATIENT
Start: 2023-09-05

## 2023-09-05 NOTE — PROGRESS NOTES
This visit is conducted using Telemedicine with live, interactive video and audio. Patient has been referred to the [facility] website at [email or web address] to review the yearly Consent to Treat document. Patient understands and accepts financial responsibility for any deductible, co-insurance and/or co-pays associated with this service. HISTORY OF PRESENT ILLNESS  Patient presents with:  Weight Check      Ac Piña is a 36year old female here for follow up in medical weight loss program.   181lbs  Pt is compliant on adderall  Denies chest pain, shortness of breath, dizziness, blurred vision, headache, paresthesia, nausea/vomiting.    Is on adderall, but did feel like vyvanse was working better, but was on [employer] like more binge episodes at night, and feels like the adderall wears off    Exercise/Activity: walking during lunch when weather permits  Nutrition: 24 hour food log reviewed, eating regular meals, +protein, feels like does well during the day and at night is where she falls off  Stress: manageable  Sleep: no new problems        Wt Readings from Last 6 Encounters:  06/30/23 : 179 lb (81.2 kg)  04/21/23 : 186 lb (84.4 kg)  12/06/22 : 184 lb (83.5 kg)  09/10/22 : 183 lb 8 oz (83.2 kg)  06/10/22 : 185 lb (83.9 kg)  12/10/21 : 186 lb (84.4 kg)           Breakfast Lunch Dinner Snacks Fluids   Reviewed           REVIEW OF SYSTEMS  GENERAL HEALTH: feels well otherwise, denied any fevers chills or night sweats   RESPIRATORY: denies shortness of breath   CARDIOVASCULAR: denies chest pain  GI: denies abdominal pain    EXAM  LMP 06/03/2023 (Approximate)   GENERAL: well developed, well nourished,in no apparent distress, A/O x3  SKIN: no rashes,no suspicious lesions on visible skin  HEENT: atraumatic, normocephalic  LUNGS: no increased effort or work with breathing   NEURO: speaking fluently and in clear sentences    Lab Results   Component Value Date    WBC 7.6 06/23/2023    RBC 4.88 06/23/2023    HGB 13.8 06/23/2023    HCT 44.3 06/23/2023    MCV 90.8 06/23/2023    MCH 28.3 06/23/2023    MCHC 31.2 06/23/2023    RDW 14.0 06/23/2023    .0 06/23/2023     Lab Results   Component Value Date    GLU 88 06/23/2023    BUN 10 06/23/2023    BUNCREA 10.5 02/10/2021    CREATSERUM 0.79 06/23/2023    ANIONGAP 4 06/23/2023    GFRNAA 80 06/22/2022    GFRAA 92 06/22/2022    CA 9.1 06/23/2023    OSMOCALC 280 06/23/2023    ALKPHO 74 06/23/2023    AST 21 06/23/2023    ALT 20 06/23/2023    BILT 0.2 06/23/2023    TP 7.4 06/23/2023    ALB 3.8 06/23/2023    GLOBULIN 3.6 06/23/2023     06/23/2023    K 3.9 06/23/2023     06/23/2023    CO2 26.0 06/23/2023     No results found for: EAG, A1C  Lab Results   Component Value Date    CHOLEST 255 (H) 06/23/2023    TRIG 201 (H) 06/23/2023    HDL 64 (H) 06/23/2023     (H) 06/23/2023    VLDL 39 (H) 06/23/2023    NONHDLC 191 (H) 06/23/2023     Lab Results   Component Value Date    TSH 1.250 06/23/2023     Lab Results   Component Value Date    B12 437 02/10/2021     Lab Results   Component Value Date    VITD 34.2 06/23/2023       amphetamine-dextroamphetamine ER (ADDERALL XR) 30 MG Oral Capsule SR 24 Hr, Take 1 capsule (30 mg total) by mouth daily. , Disp: 30 capsule, Rfl: 0  Amphetamine-Dextroamphet ER (ADDERALL XR) 25 MG Oral Capsule SR 24 Hr, Take 1 capsule (25 mg total) by mouth daily. , Disp: 30 capsule, Rfl: 0  sertraline 50 MG Oral Tab, Take 1 tablet (50 mg total) by mouth daily. , Disp: 90 tablet, Rfl: 1  Rizatriptan Benzoate 10 MG Oral Tab, Take 1 tablet (10 mg total) by mouth as needed. , Disp: 8 tablet, Rfl: 2  fenofibrate micronized 134 MG Oral Cap, Take 1 capsule (134 mg total) by mouth daily. , Disp: 90 capsule, Rfl: 1  Norgestim-Eth Estrad Triphasic (TRI-SPRINTEC) 0.18/0.215/0.25 MG-35 MCG Oral Tab, Take 1 tablet by mouth daily. , Disp: 84 tablet, Rfl: 4  FLUTICASONE PROPIONATE NA, by Nasal route., Disp: , Rfl:   Calcium Carbonate Antacid (TUMS) 500 MG Oral Chew Tab, Chew 2 tablets (1,000 mg total) by mouth 2 (two) times daily. , Disp: , Rfl:     No current facility-administered medications on file prior to visit. ASSESSMENT  Analyzed weight data:       Diagnoses and all orders for this visit:    Encounter for therapeutic drug level monitoring    Class 1 obesity with serious comorbidity and body mass index (BMI) of 30.0 to 30.9 in adult, unspecified obesity type    Binge eating disorder    Mixed hyperlipidemia    Other orders  -     lisdexamfetamine (VYVANSE) 50 MG Oral Cap; Take 1 capsule (50 mg total) by mouth daily. -     lisdexamfetamine (VYVANSE) 50 MG Oral Cap; Take 1 capsule (50 mg total) by mouth daily. -     lisdexamfetamine (VYVANSE) 50 MG Oral Cap; Take 1 capsule (50 mg total) by mouth daily. -     topiramate 25 MG Oral Tab; Take 1 tablet (25 mg total) by mouth daily. PLAN  Initial Weight: 186lbs  Initial Weight Date: 4/21/23  Today's Weight: 181lbs  5% Goal: 9.3lbs  10% Goal: 18.6lbs  Total Weight Loss: Net loss 5lbs    Discontinue Adderall  Will switch back to vyvanse - discussed side effects including but not limited to:( elevated BP, tremor, anxiety, headache, constipation and serious side effects including arrhythmia and cad ) patient verbalized understanding agrees with the plan  Will begin topamax - discussed MOA, advised side effects and adverse effects of medication, denies h/o kidney stones or glaucoma  HLD - stable on current medication fenofibrate, will continue, will continue to work on lifestyle modifications  Consistency with logging foods - protein and produce  Nutrition: low carb diet/ recommended to eat breakfast daily/ regular protein intake  Medication use and side effects reviewed with patient. Medication contraindications: n/a  Follow up with dietitian and psychologist as recommended. Discussed the role of sleep and stress in weight management.   Counseled on comprehensive weight loss plan including attention to nutrition, exercise and behavior/stress management for success. See patient instruction below for more details. Discussed strategies to overcome barriers to successful weight loss and weight maintenance  FITTE: ACSM recommendations (150-300 minutes/ week in active weight loss)   Weight Loss consent to treat reviewed and signed     There are no Patient Instructions on file for this visit. No follow-ups on file. Patient verbalizes understanding. Juana Yepez PA-C  9/5/2023    Please note that the following visit was completed using two-way, real-time interactive audio and video communication. This has been done in good myles to provide continuity of care in the best interest of the provider-patient relationship, due to the ongoing public health crisis/national emergency and because of restrictions of visitation. There are limitations of this visit as no physical exam could be performed. Every conscious effort was taken to allow for sufficient and adequate time. This billing was spent on reviewing labs, medications, radiology tests and decision making.   Appropriate medical decision-making and tests are ordered as detailed in the plan of care above    Juana Yepez PA-C

## 2023-09-08 ENCOUNTER — PATIENT MESSAGE (OUTPATIENT)
Dept: INTERNAL MEDICINE CLINIC | Facility: CLINIC | Age: 43
End: 2023-09-08

## 2023-09-28 ENCOUNTER — PATIENT MESSAGE (OUTPATIENT)
Dept: FAMILY MEDICINE CLINIC | Facility: CLINIC | Age: 43
End: 2023-09-28

## 2023-09-29 NOTE — TELEPHONE ENCOUNTER
From: Boone April  To: Alejandro Barry  Sent: 9/28/2023 10:20 PM CDT  Subject: Cough     I've had a cough for over a month now and didn't know if it was worth getting it checked out. I think it is hay fever but I can't shake it. Having a little trouble taking a full breath.

## 2023-09-29 NOTE — TELEPHONE ENCOUNTER
Called patient who states she has had ongoing post nasal drip and cough for the past month. No sick exposures. No fever or other URI symptoms. Has been taking claritin and using flonase with little improvement. Advised to cont. OTC tx, steam tx, can try Delsym cough and push fluids. Offered appt tomorrow. Advised to wear mask in office due to cough. Verbalizes understanding.     Future Appointments   Date Time Provider Peter Kim   9/30/2023  9:20 AM Yasir Hickey MD [facility]   12/20/2023 11:00 AM Yasir Hickey MD [facility]

## 2023-09-30 ENCOUNTER — OFFICE VISIT (OUTPATIENT)
Dept: FAMILY MEDICINE CLINIC | Facility: CLINIC | Age: 43
End: 2023-09-30
Payer: COMMERCIAL

## 2023-09-30 VITALS
OXYGEN SATURATION: 98 % | HEIGHT: 65 IN | RESPIRATION RATE: 18 BRPM | HEART RATE: 99 BPM | BODY MASS INDEX: 30.16 KG/M2 | DIASTOLIC BLOOD PRESSURE: 80 MMHG | TEMPERATURE: 98 F | WEIGHT: 181 LBS | SYSTOLIC BLOOD PRESSURE: 110 MMHG

## 2023-09-30 DIAGNOSIS — R05.3 CHRONIC COUGH: ICD-10-CM

## 2023-09-30 DIAGNOSIS — J30.1 SEASONAL ALLERGIC RHINITIS DUE TO POLLEN: Primary | ICD-10-CM

## 2023-09-30 PROCEDURE — 3074F SYST BP LT 130 MM HG: CPT | Performed by: FAMILY MEDICINE

## 2023-09-30 PROCEDURE — 99213 OFFICE O/P EST LOW 20 MIN: CPT | Performed by: FAMILY MEDICINE

## 2023-09-30 PROCEDURE — 3008F BODY MASS INDEX DOCD: CPT | Performed by: FAMILY MEDICINE

## 2023-09-30 PROCEDURE — 3079F DIAST BP 80-89 MM HG: CPT | Performed by: FAMILY MEDICINE

## 2023-09-30 RX ORDER — BENZONATATE 200 MG/1
200 CAPSULE ORAL 3 TIMES DAILY PRN
Qty: 21 CAPSULE | Refills: 0 | Status: SHIPPED | OUTPATIENT
Start: 2023-09-30 | End: 2023-10-07

## 2023-09-30 RX ORDER — MONTELUKAST SODIUM 10 MG/1
10 TABLET ORAL NIGHTLY
Qty: 30 TABLET | Refills: 0 | Status: SHIPPED | OUTPATIENT
Start: 2023-09-30 | End: 2023-10-30

## 2023-11-13 RX ORDER — LISDEXAMFETAMINE DIMESYLATE CAPSULES 50 MG/1
50 CAPSULE ORAL DAILY
Qty: 30 CAPSULE | Refills: 0 | Status: CANCELLED | OUTPATIENT
Start: 2023-11-13 | End: 2023-12-13

## 2023-11-14 ENCOUNTER — PATIENT MESSAGE (OUTPATIENT)
Dept: INTERNAL MEDICINE CLINIC | Facility: CLINIC | Age: 43
End: 2023-11-14

## 2023-11-14 NOTE — TELEPHONE ENCOUNTER
From: Ed Ojeda  To: Grace Penny  Sent: 11/14/2023 11:57 AM CST  Subject: Vyvanse 50mg for 30 days    I've been having insurance issues! I was first told [facility]. Now I'm told I can do [facility] for 30 day. So please remove the 90 day prescription and proceed 30 day 50mg at [place]. Sorry for the confusion.

## 2023-11-16 RX ORDER — LISDEXAMFETAMINE DIMESYLATE CAPSULES 50 MG/1
50 CAPSULE ORAL DAILY
Qty: 30 CAPSULE | Refills: 0 | Status: SHIPPED | OUTPATIENT
Start: 2023-11-19 | End: 2023-12-19

## 2023-11-30 DIAGNOSIS — E78.2 MIXED HYPERLIPIDEMIA: ICD-10-CM

## 2023-11-30 DIAGNOSIS — F41.1 GENERALIZED ANXIETY DISORDER: ICD-10-CM

## 2023-11-30 RX ORDER — FENOFIBRATE 134 MG/1
134 CAPSULE ORAL DAILY
Qty: 90 CAPSULE | Refills: 0 | Status: SHIPPED
Start: 2023-11-30

## 2023-11-30 NOTE — TELEPHONE ENCOUNTER
Name from pharmacy: SERTRALINE 50MG TABLETS         Will file in chart as: SERTRALINE 50 MG Oral Tab    Sig: TAKE 1 TABLET(50 MG) BY MOUTH DAILY    Disp: 90 tablet    Refills: 1 (Pharmacy requested: Not specified)    Start: 11/30/2023     LOV  6/30/23     LAST LAB n/a     LAST RX 6/30/23 90 with 1     Next OV   Future Appointments   Date Time Provider Peter Kim   12/20/2023 11:00 AM Leti Wylie MD [facility]         PROTOCOL none

## 2023-12-11 DIAGNOSIS — Z30.41 SURVEILLANCE FOR BIRTH CONTROL, ORAL CONTRACEPTIVES: ICD-10-CM

## 2023-12-12 ENCOUNTER — LAB ENCOUNTER (OUTPATIENT)
Dept: LAB | Age: 43
End: 2023-12-12
Attending: FAMILY MEDICINE
Payer: COMMERCIAL

## 2023-12-12 DIAGNOSIS — D50.9 IRON DEFICIENCY ANEMIA, UNSPECIFIED IRON DEFICIENCY ANEMIA TYPE: ICD-10-CM

## 2023-12-12 DIAGNOSIS — E78.2 MIXED HYPERLIPIDEMIA: ICD-10-CM

## 2023-12-12 LAB
ALBUMIN SERPL-MCNC: 3.7 G/DL (ref 3.4–5)
ALBUMIN/GLOB SERPL: 1 {RATIO} (ref 1–2)
ALP LIVER SERPL-CCNC: 75 U/L
ALT SERPL-CCNC: 27 U/L
ANION GAP SERPL CALC-SCNC: 7 MMOL/L (ref 0–18)
AST SERPL-CCNC: 16 U/L (ref 15–37)
BILIRUB SERPL-MCNC: 0.4 MG/DL (ref 0.1–2)
BUN BLD-MCNC: 10 MG/DL (ref 9–23)
CALCIUM BLD-MCNC: 9.3 MG/DL (ref 8.5–10.1)
CHLORIDE SERPL-SCNC: 107 MMOL/L (ref 98–112)
CHOLEST SERPL-MCNC: 231 MG/DL (ref ?–200)
CO2 SERPL-SCNC: 27 MMOL/L (ref 21–32)
CREAT BLD-MCNC: 0.83 MG/DL
DEPRECATED HBV CORE AB SER IA-ACNC: 30.4 NG/ML
EGFRCR SERPLBLD CKD-EPI 2021: 89 ML/MIN/1.73M2 (ref 60–?)
FASTING PATIENT LIPID ANSWER: YES
FASTING STATUS PATIENT QL REPORTED: YES
GLOBULIN PLAS-MCNC: 3.6 G/DL (ref 2.8–4.4)
GLUCOSE BLD-MCNC: 85 MG/DL (ref 70–99)
HDLC SERPL-MCNC: 75 MG/DL (ref 40–59)
IRON SATN MFR SERPL: 15 %
IRON SERPL-MCNC: 77 UG/DL
LDLC SERPL CALC-MCNC: 127 MG/DL (ref ?–100)
NONHDLC SERPL-MCNC: 156 MG/DL (ref ?–130)
OSMOLALITY SERPL CALC.SUM OF ELEC: 290 MOSM/KG (ref 275–295)
POTASSIUM SERPL-SCNC: 4.1 MMOL/L (ref 3.5–5.1)
PROT SERPL-MCNC: 7.3 G/DL (ref 6.4–8.2)
SODIUM SERPL-SCNC: 141 MMOL/L (ref 136–145)
TIBC SERPL-MCNC: 502 UG/DL (ref 240–450)
TRANSFERRIN SERPL-MCNC: 337 MG/DL (ref 200–360)
TRIGL SERPL-MCNC: 167 MG/DL (ref 30–149)
VLDLC SERPL CALC-MCNC: 30 MG/DL (ref 0–30)

## 2023-12-12 PROCEDURE — 83550 IRON BINDING TEST: CPT | Performed by: FAMILY MEDICINE

## 2023-12-12 PROCEDURE — 82728 ASSAY OF FERRITIN: CPT | Performed by: FAMILY MEDICINE

## 2023-12-12 PROCEDURE — 80053 COMPREHEN METABOLIC PANEL: CPT | Performed by: FAMILY MEDICINE

## 2023-12-12 PROCEDURE — 83540 ASSAY OF IRON: CPT | Performed by: FAMILY MEDICINE

## 2023-12-12 PROCEDURE — 80061 LIPID PANEL: CPT | Performed by: FAMILY MEDICINE

## 2023-12-12 RX ORDER — NORGESTIMATE AND ETHINYL ESTRADIOL 7DAYSX3 28
1 KIT ORAL DAILY
Qty: 84 TABLET | Refills: 4 | OUTPATIENT
Start: 2023-12-12

## 2023-12-12 NOTE — TELEPHONE ENCOUNTER
Last OV: 09/10/2022  Last refill date: 09/10/2022  Follow-up: Annual  Next appt.: None  Patient due for annual.  Refill refused. MCM to pt to schedule.

## 2023-12-20 ENCOUNTER — OFFICE VISIT (OUTPATIENT)
Dept: FAMILY MEDICINE CLINIC | Facility: CLINIC | Age: 43
End: 2023-12-20
Payer: COMMERCIAL

## 2023-12-20 VITALS
DIASTOLIC BLOOD PRESSURE: 72 MMHG | WEIGHT: 186 LBS | RESPIRATION RATE: 18 BRPM | SYSTOLIC BLOOD PRESSURE: 118 MMHG | BODY MASS INDEX: 30.99 KG/M2 | HEART RATE: 100 BPM | OXYGEN SATURATION: 98 % | HEIGHT: 65 IN | TEMPERATURE: 98 F

## 2023-12-20 DIAGNOSIS — Z30.41 SURVEILLANCE FOR BIRTH CONTROL, ORAL CONTRACEPTIVES: ICD-10-CM

## 2023-12-20 DIAGNOSIS — Z00.00 ROUTINE GENERAL MEDICAL EXAMINATION AT A HEALTH CARE FACILITY: Primary | ICD-10-CM

## 2023-12-20 DIAGNOSIS — F41.1 GENERALIZED ANXIETY DISORDER: ICD-10-CM

## 2023-12-20 DIAGNOSIS — E78.2 MIXED HYPERLIPIDEMIA: ICD-10-CM

## 2023-12-20 DIAGNOSIS — E55.9 VITAMIN D INSUFFICIENCY: ICD-10-CM

## 2023-12-20 DIAGNOSIS — G43.829 MENSTRUAL MIGRAINE WITHOUT STATUS MIGRAINOSUS, NOT INTRACTABLE: ICD-10-CM

## 2023-12-20 RX ORDER — ROSUVASTATIN CALCIUM 5 MG/1
5 TABLET, COATED ORAL NIGHTLY
Qty: 30 TABLET | Refills: 2 | Status: SHIPPED | OUTPATIENT
Start: 2023-12-20

## 2023-12-20 RX ORDER — FENOFIBRATE 134 MG/1
134 CAPSULE ORAL DAILY
Qty: 90 CAPSULE | Refills: 0 | Status: SHIPPED | OUTPATIENT
Start: 2023-12-20

## 2023-12-20 RX ORDER — NORGESTIMATE AND ETHINYL ESTRADIOL 7DAYSX3 28
1 KIT ORAL DAILY
Qty: 84 TABLET | Refills: 3 | Status: SHIPPED | OUTPATIENT
Start: 2023-12-20

## 2023-12-20 RX ORDER — RIZATRIPTAN BENZOATE 10 MG/1
10 TABLET ORAL AS NEEDED
Qty: 8 TABLET | Refills: 2 | Status: SHIPPED | OUTPATIENT
Start: 2023-12-20

## 2024-03-03 DIAGNOSIS — E78.2 MIXED HYPERLIPIDEMIA: ICD-10-CM

## 2024-03-04 RX ORDER — ROSUVASTATIN CALCIUM 5 MG/1
5 TABLET, COATED ORAL NIGHTLY
Qty: 30 TABLET | Refills: 2 | Status: SHIPPED | OUTPATIENT
Start: 2024-03-04

## 2024-03-05 DIAGNOSIS — F50.81 BINGE EATING DISORDER: ICD-10-CM

## 2024-03-05 DIAGNOSIS — Z51.81 ENCOUNTER FOR THERAPEUTIC DRUG LEVEL MONITORING: ICD-10-CM

## 2024-03-05 DIAGNOSIS — E66.9 CLASS 1 OBESITY WITH SERIOUS COMORBIDITY AND BODY MASS INDEX (BMI) OF 30.0 TO 30.9 IN ADULT, UNSPECIFIED OBESITY TYPE: ICD-10-CM

## 2024-03-06 RX ORDER — LISDEXAMFETAMINE DIMESYLATE CAPSULES 50 MG/1
50 CAPSULE ORAL EVERY MORNING
Qty: 30 CAPSULE | Refills: 0 | OUTPATIENT
Start: 2024-03-06

## 2024-03-06 NOTE — TELEPHONE ENCOUNTER
Requesting   Requested Prescriptions     Pending Prescriptions Disp Refills    lisdexamfetamine (VYVANSE) 50 MG Oral Cap 30 capsule 0     Sig: Take 1 capsule (50 mg total) by mouth every morning.     LOV: 9/5/23  RTC: not noted  Filled: 2/5/24 #30 with 0 refills    Future Appointments   Date Time Provider Department Center   6/18/2024 12:40 PM Ellie Wray MD [facility]     Needs appt

## 2024-03-08 DIAGNOSIS — E78.2 MIXED HYPERLIPIDEMIA: ICD-10-CM

## 2024-03-08 DIAGNOSIS — F41.1 GENERALIZED ANXIETY DISORDER: ICD-10-CM

## 2024-03-08 RX ORDER — FENOFIBRATE 134 MG/1
CAPSULE ORAL
Qty: 90 CAPSULE | Refills: 0 | Status: SHIPPED | OUTPATIENT
Start: 2024-03-08

## 2024-03-08 NOTE — TELEPHONE ENCOUNTER
LOV 12/20/2023    LAST LAB 12-12-23    LAST RX 12-20-22 90*0    Next OV   Future Appointments   Date Time Provider Department Center   6/18/2024 12:40 PM Ellie Wray MD [facility]       PROTOCOL   Name from pharmacy: FENOFIBRATE 134MG CAPSULES         Will file in chart as: FENOFIBRATE MICRONIZED 134 MG Oral Cap    Sig: TAKE 1 CAPSULE( 134 MG) BY MOUTH DAILY    Disp: 90 capsule    Refills: 0 (Pharmacy requested: Not specified)    Start: 3/8/2024    Class: Normal    Non-formulary For: Mixed hyperlipidemia    Last ordered: 2 months ago (12/20/2023) by Ellie Wray MD    Last refill: 12/4/2023    Rx #: [id number]    Cholesterol Medication Protocol [id number]/2024 09:35 AM   Protocol Details ALT < 80    ALT resulted within past year    Lipid panel within past 12 months    In person appointment or virtual visit in the past 12 mos or appointment in next 3 mos       Name from pharmacy: SERTRALINE 50MG TABLETS         Will file in chart as: SERTRALINE 50 MG Oral Tab    Sig: TAKE 1 TABLET BY MOUTH DAILY    Disp: 90 tablet    Refills: 0 (Pharmacy requested: Not specified)    Start: 3/8/2024    Class: Normal    Non-formulary For: Generalized anxiety disorder    Last ordered: 2 months ago (12/20/2023) by Ellie Wray MD    Last refill: 12/4/2023    Rx #: [id number]    Psychiatric Non-Scheduled (Anti-Anxiety) [id number]/08/2024 09:35 AM   Protocol Details In person appointment or virtual visit in the past 6 mos or appointment in next 3 mos    Depression Screening completed within the past 12 months

## 2024-03-11 DIAGNOSIS — E66.9 CLASS 1 OBESITY WITH SERIOUS COMORBIDITY AND BODY MASS INDEX (BMI) OF 30.0 TO 30.9 IN ADULT, UNSPECIFIED OBESITY TYPE: ICD-10-CM

## 2024-03-11 DIAGNOSIS — F50.81 BINGE EATING DISORDER: ICD-10-CM

## 2024-03-11 DIAGNOSIS — Z51.81 ENCOUNTER FOR THERAPEUTIC DRUG LEVEL MONITORING: ICD-10-CM

## 2024-03-11 NOTE — TELEPHONE ENCOUNTER
Requesting   Requested Prescriptions     Pending Prescriptions Disp Refills    lisdexamfetamine (VYVANSE) 50 MG Oral Cap 30 capsule 0     Sig: Take 1 capsule (50 mg total) by mouth every morning.       LOV: 9/5/23  RTC:   Last Relevant Labs:   Filled: 2/5/24 #30 with 0 refills    Future Appointments   Date Time Provider Department Center   6/18/2024 12:40 PM Ellie Wray MD [facility]     Mychart sent to make an appt

## 2024-03-12 RX ORDER — LISDEXAMFETAMINE DIMESYLATE CAPSULES 50 MG/1
50 CAPSULE ORAL EVERY MORNING
Qty: 30 CAPSULE | Refills: 0 | Status: SHIPPED | OUTPATIENT
Start: 2024-03-12

## 2024-04-26 DIAGNOSIS — G43.829 MENSTRUAL MIGRAINE WITHOUT STATUS MIGRAINOSUS, NOT INTRACTABLE: ICD-10-CM

## 2024-04-26 RX ORDER — RIZATRIPTAN BENZOATE 10 MG/1
10 TABLET ORAL AS NEEDED
Qty: 8 TABLET | Refills: 2 | Status: SHIPPED | OUTPATIENT
Start: 2024-04-26

## 2024-04-29 DIAGNOSIS — Z51.81 ENCOUNTER FOR THERAPEUTIC DRUG LEVEL MONITORING: ICD-10-CM

## 2024-04-29 DIAGNOSIS — E66.9 CLASS 1 OBESITY WITH SERIOUS COMORBIDITY AND BODY MASS INDEX (BMI) OF 30.0 TO 30.9 IN ADULT, UNSPECIFIED OBESITY TYPE: ICD-10-CM

## 2024-04-29 DIAGNOSIS — F50.81 BINGE EATING DISORDER: ICD-10-CM

## 2024-05-01 RX ORDER — LISDEXAMFETAMINE DIMESYLATE 50 MG/1
50 CAPSULE ORAL EVERY MORNING
Qty: 30 CAPSULE | Refills: 0 | Status: SHIPPED | OUTPATIENT
Start: 2024-05-01

## 2024-05-07 DIAGNOSIS — F50.81 BINGE EATING DISORDER: ICD-10-CM

## 2024-05-07 DIAGNOSIS — E66.9 CLASS 1 OBESITY WITH SERIOUS COMORBIDITY AND BODY MASS INDEX (BMI) OF 30.0 TO 30.9 IN ADULT, UNSPECIFIED OBESITY TYPE: ICD-10-CM

## 2024-05-07 DIAGNOSIS — Z51.81 ENCOUNTER FOR THERAPEUTIC DRUG LEVEL MONITORING: ICD-10-CM

## 2024-05-07 RX ORDER — LISDEXAMFETAMINE DIMESYLATE 50 MG/1
50 CAPSULE ORAL EVERY MORNING
Qty: 30 CAPSULE | Refills: 0 | Status: CANCELLED | OUTPATIENT
Start: 2024-05-07

## 2024-05-15 DIAGNOSIS — E78.2 MIXED HYPERLIPIDEMIA: ICD-10-CM

## 2024-05-17 RX ORDER — ROSUVASTATIN CALCIUM 5 MG/1
5 TABLET, COATED ORAL NIGHTLY
Qty: 90 TABLET | Refills: 3 | Status: SHIPPED | OUTPATIENT
Start: 2024-05-17

## 2024-05-17 NOTE — TELEPHONE ENCOUNTER
Refill passed per protocol.    Requested Prescriptions   Pending Prescriptions Disp Refills    rosuvastatin 5 MG Oral Tab [Pharmacy Med Name: ROSUVASTATIN 5MG TABLETS] 90 tablet 3     Sig: TAKE 1 TABLET BY MOUTH EVERY NIGHT       Cholesterol Medication Protocol Passed - 5/17/2024  5:33 PM        Passed - ALT < 80     Lab Results   Component Value Date    ALT 27 12/12/2023             Passed - ALT resulted within past year        Passed - Lipid panel within past 12 months     Lab Results   Component Value Date    CHOLEST 231 (H) 12/12/2023    TRIG 167 (H) 12/12/2023    HDL 75 (H) 12/12/2023     (H) 12/12/2023    VLDL 30 12/12/2023    NONHDLC 156 (H) 12/12/2023             Passed - In person appointment or virtual visit in the past 12 mos or appointment in next 3 mos     Recent Outpatient Visits              4 months ago Routine general medical examination at a health care facility    [address] Ellie Wray MD    Office Visit    7 months ago Seasonal allergic rhinitis due to pollen    [address] Ellie Wray MD    Office Visit    8 months ago Encounter for therapeutic drug level monitoring    [address] Laura Love PA-C    Telemedicine    10 months ago Mixed hyperlipidemia    [address] Ellie Wray MD    Office Visit    1 year ago Mixed hyperlipidemia    [facility], [address] [place] [address]    Nurse Only          Future Appointments         Provider Department Appt Notes    In 1 month Ellie Wray MD [address] 6 month follow up    In 1 month Laura Love PAKamilleC [address] Follow up                         Future Appointments         Provider Department Appt Notes    In 1 month Nils  MD Ellie [facility], [address] [place] [address] 6 month follow up    In 1 month Laura Love PA-C [facility], [address] [place] [address] Follow up          Recent Outpatient Visits              4 months ago Routine general medical examination at a health care facility    [facility], [address] [place] [address] Ellie Wray MD    Office Visit    7 months ago Seasonal allergic rhinitis due to pollen    [facility], [address] [place] [address] Ellie Wray MD    Office Visit    8 months ago Encounter for therapeutic drug level monitoring    [facility], [address] [place] [address] Laura Love PA-C    Telemedicine    10 months ago Mixed hyperlipidemia    [address] Ellie Wray MD    Office Visit    1 year ago Mixed hyperlipidemia    [facility], [address] [place] [address]    Nurse Only

## 2024-05-22 DIAGNOSIS — E78.2 MIXED HYPERLIPIDEMIA: ICD-10-CM

## 2024-05-26 RX ORDER — FENOFIBRATE 134 MG/1
134 CAPSULE ORAL DAILY
Qty: 90 CAPSULE | Refills: 3 | Status: SHIPPED | OUTPATIENT
Start: 2024-05-26

## 2024-05-26 NOTE — TELEPHONE ENCOUNTER
REFILL PASSED PER [facility] PROTOCOLS    Requested Prescriptions   Pending Prescriptions Disp Refills    FENOFIBRATE MICRONIZED 134 MG Oral Cap [Pharmacy Med Name: FENOFIBRATE 134MG CAPSULES] 90 capsule 0     Sig: TAKE 1 CAPSULE BY MOUTH DAILY       Cholesterol Medication Protocol Passed - 5/22/2024  9:37 AM        Passed - ALT < 80     Lab Results   Component Value Date    ALT 27 12/12/2023             Passed - ALT resulted within past year        Passed - Lipid panel within past 12 months     Lab Results   Component Value Date    CHOLEST 231 (H) 12/12/2023    TRIG 167 (H) 12/12/2023    HDL 75 (H) 12/12/2023     (H) 12/12/2023    VLDL 30 12/12/2023    NONHDLC 156 (H) 12/12/2023             Passed - In person appointment or virtual visit in the past 12 mos or appointment in next 3 mos     Recent Outpatient Visits              5 months ago Routine general medical examination at a health care facility    [address] Ellie Wray MD    Office Visit    7 months ago Seasonal allergic rhinitis due to pollen    [address] Ellie Wray MD    Office Visit    8 months ago Encounter for therapeutic drug level monitoring    [address] Laura Love PA-C    Telemedicine    11 months ago Mixed hyperlipidemia    [address] Ellie Wray MD    Office Visit    1 year ago Mixed hyperlipidemia    [facility], [address] [place] [address]    Nurse Only          Future Appointments         Provider Department Appt Notes    In 3 weeks Ellie Wray MD [address] 6 month follow up    In 1 month Laura Love PAKamilleC [address] Follow up                         Future Appointments         Provider Department Appt Notes     In 3 weeks Ellie Wray MD [facility], [address] [place] [address] 6 month follow up    In 1 month Laura Love PA-C [facility], [address] [place] [address] Follow up          Recent Outpatient Visits              5 months ago Routine general medical examination at a health care facility    [address] Ellie Wray MD    Office Visit    7 months ago Seasonal allergic rhinitis due to pollen    [address] Ellie Wray MD    Office Visit    8 months ago Encounter for therapeutic drug level monitoring    [address] Laura Love PA-C    Telemedicine    11 months ago Mixed hyperlipidemia    [address] Ellie Wray MD    Office Visit    1 year ago Mixed hyperlipidemia    [facility], [address] [place] [address]    Nurse Only

## 2024-05-30 RX ORDER — LISDEXAMFETAMINE DIMESYLATE 50 MG/1
50 CAPSULE ORAL DAILY
Qty: 30 CAPSULE | Refills: 0 | Status: SHIPPED | OUTPATIENT
Start: 2024-06-30 | End: 2024-07-16

## 2024-05-30 RX ORDER — LISDEXAMFETAMINE DIMESYLATE 50 MG/1
50 CAPSULE ORAL DAILY
Qty: 30 CAPSULE | Refills: 0 | Status: SHIPPED | OUTPATIENT
Start: 2024-07-31 | End: 2024-07-16

## 2024-05-30 RX ORDER — LISDEXAMFETAMINE DIMESYLATE 50 MG/1
50 CAPSULE ORAL DAILY
Qty: 30 CAPSULE | Refills: 0 | Status: SHIPPED | OUTPATIENT
Start: 2024-05-30 | End: 2024-06-29

## 2024-06-09 ENCOUNTER — PATIENT MESSAGE (OUTPATIENT)
Dept: FAMILY MEDICINE CLINIC | Facility: CLINIC | Age: 44
End: 2024-06-09

## 2024-06-10 DIAGNOSIS — Z12.31 VISIT FOR SCREENING MAMMOGRAM: ICD-10-CM

## 2024-06-10 DIAGNOSIS — G43.829 MENSTRUAL MIGRAINE WITHOUT STATUS MIGRAINOSUS, NOT INTRACTABLE: ICD-10-CM

## 2024-06-10 NOTE — TELEPHONE ENCOUNTER
From: Renata Bravo  To: Ellie Wray  Sent: 6/9/2024 6:38 PM CDT  Subject: Question about my upcoming appointment     Hello! I cannot remember if I was supposed to get a lab test before my appointment on the 18th?

## 2024-06-12 DIAGNOSIS — F41.1 GENERALIZED ANXIETY DISORDER: ICD-10-CM

## 2024-06-14 RX ORDER — RIZATRIPTAN BENZOATE 10 MG/1
10 TABLET ORAL AS NEEDED
Qty: 8 TABLET | Refills: 3 | Status: SHIPPED | OUTPATIENT
Start: 2024-06-14

## 2024-06-14 NOTE — TELEPHONE ENCOUNTER
Refill Passed per Protocol.     Requested Prescriptions   Pending Prescriptions Disp Refills    SERTRALINE 50 MG Oral Tab [Pharmacy Med Name: SERTRALINE 50MG TABLETS] 90 tablet 0     Sig: TAKE 1 TABLET BY MOUTH DAILY       Psychiatric Non-Scheduled (Anti-Anxiety) Passed - 6/12/2024  9:37 AM        Passed - In person appointment or virtual visit in the past 6 mos or appointment in next 3 mos     Recent Outpatient Visits              5 months ago Routine general medical examination at a health care facility    [address]    Office Visit    8 months ago Seasonal allergic rhinitis due to pollen    [address] Ellie Wray MD    Office Visit    9 months ago Encounter for therapeutic drug level monitoring    [address] Laura Love PA-C    Telemedicine    11 months ago Mixed hyperlipidemia    [address] Ellie Wray MD    Office Visit    1 year ago Mixed hyperlipidemia    [address]    Nurse Only          Future Appointments         Provider Department Appt Notes    In 5 days REF BK RD [facility], [address] [place] [address] Lab    In 2 weeks Laura Love PA-C [address] Follow up    In 1 month Andrew Bianchi DO [facility],  [facility] 5/29/24 NPOV GERD 7/16/24 8a w/ Dr. Tash Dunn    In 1 month Ellie Wray MD [address] 6 month follow up                    Passed - Depression Screening completed within the past 12 months              Future Appointments         Provider Department Appt Notes    In 5 days REF BK RD [facility] Lab    In 2 weeks LoveLaura simeon PA-C [address]  Follow up    In 1 month Andrew Bianchi DO [facility],  [facility] 5/29/24 NPOV GERD 7/16/24 8a w/ Dr. Tash Dunn    In 1 month Ellie Wray MD [address] 6 month follow up          Recent Outpatient Visits              5 months ago Routine general medical examination at a health care facility    [facility], [address] [place] [address] Ellie Wray MD    Office Visit    8 months ago Seasonal allergic rhinitis due to pollen    [address] Ellie Wray MD    Office Visit    9 months ago Encounter for therapeutic drug level monitoring    [address] Laura Love PA-C    Telemedicine    11 months ago Mixed hyperlipidemia    [address] Ellie Wray MD    Office Visit    1 year ago Mixed hyperlipidemia    [facility], [address] [place] [address]    Nurse Only

## 2024-06-19 ENCOUNTER — LAB ENCOUNTER (OUTPATIENT)
Dept: LAB | Age: 44
End: 2024-06-19
Attending: FAMILY MEDICINE

## 2024-06-19 DIAGNOSIS — Z00.00 ROUTINE GENERAL MEDICAL EXAMINATION AT A HEALTH CARE FACILITY: ICD-10-CM

## 2024-06-19 DIAGNOSIS — E55.9 VITAMIN D INSUFFICIENCY: ICD-10-CM

## 2024-06-19 LAB
ALBUMIN SERPL-MCNC: 3.5 G/DL (ref 3.4–5)
ALBUMIN/GLOB SERPL: 1 {RATIO} (ref 1–2)
ALP LIVER SERPL-CCNC: 86 U/L
ALT SERPL-CCNC: 16 U/L
ANION GAP SERPL CALC-SCNC: 5 MMOL/L (ref 0–18)
AST SERPL-CCNC: 10 U/L (ref 15–37)
BASOPHILS # BLD AUTO: 0.07 X10(3) UL (ref 0–0.2)
BASOPHILS NFR BLD AUTO: 1 %
BILIRUB SERPL-MCNC: 0.4 MG/DL (ref 0.1–2)
BUN BLD-MCNC: 8 MG/DL (ref 9–23)
CALCIUM BLD-MCNC: 9.1 MG/DL (ref 8.5–10.1)
CHLORIDE SERPL-SCNC: 104 MMOL/L (ref 98–112)
CHOLEST SERPL-MCNC: 163 MG/DL (ref ?–200)
CO2 SERPL-SCNC: 26 MMOL/L (ref 21–32)
CREAT BLD-MCNC: 0.72 MG/DL
EGFRCR SERPLBLD CKD-EPI 2021: 106 ML/MIN/1.73M2 (ref 60–?)
EOSINOPHIL # BLD AUTO: 0.35 X10(3) UL (ref 0–0.7)
EOSINOPHIL NFR BLD AUTO: 4.9 %
ERYTHROCYTE [DISTWIDTH] IN BLOOD BY AUTOMATED COUNT: 13.6 %
FASTING PATIENT LIPID ANSWER: YES
FASTING STATUS PATIENT QL REPORTED: YES
GLOBULIN PLAS-MCNC: 3.4 G/DL (ref 2.8–4.4)
GLUCOSE BLD-MCNC: 88 MG/DL (ref 70–99)
HCT VFR BLD AUTO: 41.3 %
HDLC SERPL-MCNC: 65 MG/DL (ref 40–59)
HGB BLD-MCNC: 13.1 G/DL
IMM GRANULOCYTES # BLD AUTO: 0.02 X10(3) UL (ref 0–1)
IMM GRANULOCYTES NFR BLD: 0.3 %
LDLC SERPL CALC-MCNC: 69 MG/DL (ref ?–100)
LYMPHOCYTES # BLD AUTO: 2.48 X10(3) UL (ref 1–4)
LYMPHOCYTES NFR BLD AUTO: 34.7 %
MCH RBC QN AUTO: 28.5 PG (ref 26–34)
MCHC RBC AUTO-ENTMCNC: 31.7 G/DL (ref 31–37)
MCV RBC AUTO: 89.8 FL
MONOCYTES # BLD AUTO: 0.28 X10(3) UL (ref 0.1–1)
MONOCYTES NFR BLD AUTO: 3.9 %
NEUTROPHILS # BLD AUTO: 3.95 X10 (3) UL (ref 1.5–7.7)
NEUTROPHILS # BLD AUTO: 3.95 X10(3) UL (ref 1.5–7.7)
NEUTROPHILS NFR BLD AUTO: 55.2 %
NONHDLC SERPL-MCNC: 98 MG/DL (ref ?–130)
OSMOLALITY SERPL CALC.SUM OF ELEC: 278 MOSM/KG (ref 275–295)
PLATELET # BLD AUTO: 359 10(3)UL (ref 150–450)
POTASSIUM SERPL-SCNC: 4.4 MMOL/L (ref 3.5–5.1)
PROT SERPL-MCNC: 6.9 G/DL (ref 6.4–8.2)
RBC # BLD AUTO: 4.6 X10(6)UL
SODIUM SERPL-SCNC: 135 MMOL/L (ref 136–145)
TRIGL SERPL-MCNC: 178 MG/DL (ref 30–149)
TSI SER-ACNC: 1.71 MIU/ML (ref 0.36–3.74)
VIT D+METAB SERPL-MCNC: 29.3 NG/ML (ref 30–100)
VLDLC SERPL CALC-MCNC: 27 MG/DL (ref 0–30)
WBC # BLD AUTO: 7.2 X10(3) UL (ref 4–11)

## 2024-06-19 PROCEDURE — 82306 VITAMIN D 25 HYDROXY: CPT | Performed by: FAMILY MEDICINE

## 2024-06-19 PROCEDURE — 80061 LIPID PANEL: CPT | Performed by: FAMILY MEDICINE

## 2024-06-19 PROCEDURE — 80050 GENERAL HEALTH PANEL: CPT | Performed by: FAMILY MEDICINE

## 2024-06-28 ENCOUNTER — OFFICE VISIT (OUTPATIENT)
Facility: CLINIC | Age: 44
End: 2024-06-28

## 2024-06-28 VITALS
HEIGHT: 65 IN | BODY MASS INDEX: 31.32 KG/M2 | SYSTOLIC BLOOD PRESSURE: 128 MMHG | RESPIRATION RATE: 18 BRPM | DIASTOLIC BLOOD PRESSURE: 80 MMHG | OXYGEN SATURATION: 96 % | HEART RATE: 75 BPM | WEIGHT: 188 LBS

## 2024-06-28 DIAGNOSIS — E66.9 CLASS 1 OBESITY WITH BODY MASS INDEX (BMI) OF 31.0 TO 31.9 IN ADULT, UNSPECIFIED OBESITY TYPE, UNSPECIFIED WHETHER SERIOUS COMORBIDITY PRESENT: ICD-10-CM

## 2024-06-28 DIAGNOSIS — Z51.81 ENCOUNTER FOR THERAPEUTIC DRUG LEVEL MONITORING: Primary | ICD-10-CM

## 2024-06-28 DIAGNOSIS — F41.1 GENERALIZED ANXIETY DISORDER: ICD-10-CM

## 2024-06-28 DIAGNOSIS — E78.2 MIXED HYPERLIPIDEMIA: ICD-10-CM

## 2024-06-28 DIAGNOSIS — F50.81 BINGE EATING DISORDER: ICD-10-CM

## 2024-06-28 PROCEDURE — 3079F DIAST BP 80-89 MM HG: CPT | Performed by: PHYSICIAN ASSISTANT

## 2024-06-28 PROCEDURE — 3008F BODY MASS INDEX DOCD: CPT | Performed by: PHYSICIAN ASSISTANT

## 2024-06-28 PROCEDURE — 3074F SYST BP LT 130 MM HG: CPT | Performed by: PHYSICIAN ASSISTANT

## 2024-06-28 PROCEDURE — 99214 OFFICE O/P EST MOD 30 MIN: CPT | Performed by: PHYSICIAN ASSISTANT

## 2024-06-28 RX ORDER — DEXTROAMPHETAMINE SACCHARATE, AMPHETAMINE ASPARTATE, DEXTROAMPHETAMINE SULFATE AND AMPHETAMINE SULFATE 5; 5; 5; 5 MG/1; MG/1; MG/1; MG/1
20 TABLET ORAL 2 TIMES DAILY
Qty: 60 TABLET | Refills: 0 | Status: SHIPPED | OUTPATIENT
Start: 2024-06-28 | End: 2024-07-28

## 2024-06-28 RX ORDER — DEXTROAMPHETAMINE SACCHARATE, AMPHETAMINE ASPARTATE, DEXTROAMPHETAMINE SULFATE AND AMPHETAMINE SULFATE 5; 5; 5; 5 MG/1; MG/1; MG/1; MG/1
20 TABLET ORAL 2 TIMES DAILY
Qty: 60 TABLET | Refills: 0 | Status: SHIPPED | OUTPATIENT
Start: 2024-08-29 | End: 2024-09-28

## 2024-06-28 RX ORDER — DEXTROAMPHETAMINE SACCHARATE, AMPHETAMINE ASPARTATE, DEXTROAMPHETAMINE SULFATE AND AMPHETAMINE SULFATE 5; 5; 5; 5 MG/1; MG/1; MG/1; MG/1
20 TABLET ORAL 2 TIMES DAILY
Qty: 60 TABLET | Refills: 0 | Status: SHIPPED | OUTPATIENT
Start: 2024-07-29 | End: 2024-08-28

## 2024-06-28 NOTE — PROGRESS NOTES
HISTORY OF PRESENT ILLNESS  Chief Complaint   Patient presents with    Weight Check     +7lbs       Renata Bravo is a 43 year old female here for follow up in medical weight loss program.   +7lbs    Denies chest pain, shortness of breath, dizziness, blurred vision, headache, paresthesia, nausea/vomiting.   Struggling to get vyvanse, insurance will only accept generic   Notices more binging at night    Exercise/Activity: knows she needs to get back to walking now that the cicadas are gone  Nutrition: 24 hour food log reviewed, eating regular meals, +protein  Stress: 6/10  Sleep: 6 hours/night       Wt Readings from Last 6 Encounters:   06/28/24 188 lb (85.3 kg)   12/20/23 186 lb (84.4 kg)   09/30/23 181 lb (82.1 kg)   06/30/23 179 lb (81.2 kg)   04/21/23 186 lb (84.4 kg)   12/06/22 184 lb (83.5 kg)            Breakfast Lunch Dinner Snacks Fluids   Reviewed           REVIEW OF SYSTEMS  GENERAL HEALTH: feels well otherwise, denied any fevers chills or night sweats   RESPIRATORY: denies shortness of breath   CARDIOVASCULAR: denies chest pain  GI: denies abdominal pain    EXAM  /80   Pulse 75   Resp 18   Ht 5' 5\" (1.651 m)   Wt 188 lb (85.3 kg)   LMP 09/14/2023 (Approximate)   SpO2 96%   BMI 31.28 kg/m²   GENERAL: well developed, well nourished,in no apparent distress, A/O x3  SKIN: no rashes,no suspicious lesions  HEENT: atraumatic, normocephalic, OP-clear, PERRL  NECK: supple,no adenopathy  LUNGS: clear to auscultation bilaterally   CARDIO: RRR without murmur  GI: good BS's,NT/ND, no masses or HSM  EXTREMITIES: no cyanosis, no clubbing, no edema    Lab Results   Component Value Date    WBC 7.2 06/19/2024    RBC 4.60 06/19/2024    HGB 13.1 06/19/2024    HCT 41.3 06/19/2024    MCV 89.8 06/19/2024    MCH 28.5 06/19/2024    MCHC 31.7 06/19/2024    RDW 13.6 06/19/2024    .0 06/19/2024     Lab Results   Component Value Date    GLU 88 06/19/2024    BUN 8 (L) 06/19/2024    BUNCREA 10.5 02/10/2021     CREATSERUM 0.72 06/19/2024    ANIONGAP 5 06/19/2024    GFRNAA 80 06/22/2022    GFRAA 92 06/22/2022    CA 9.1 06/19/2024    OSMOCALC 278 06/19/2024    ALKPHO 86 06/19/2024    AST 10 (L) 06/19/2024    ALT 16 06/19/2024    BILT 0.4 06/19/2024    TP 6.9 06/19/2024    ALB 3.5 06/19/2024    GLOBULIN 3.4 06/19/2024     (L) 06/19/2024    K 4.4 06/19/2024     06/19/2024    CO2 26.0 06/19/2024     No results found for: \"EAG\", \"A1C\"  Lab Results   Component Value Date    CHOLEST 163 06/19/2024    TRIG 178 (H) 06/19/2024    HDL 65 (H) 06/19/2024    LDL 69 06/19/2024    VLDL 27 06/19/2024    NONHDLC 98 06/19/2024     Lab Results   Component Value Date    TSH 1.710 06/19/2024     Lab Results   Component Value Date    B12 437 02/10/2021     Lab Results   Component Value Date    VITD 29.3 (L) 06/19/2024       Current Outpatient Medications on File Prior to Visit   Medication Sig Dispense Refill    Rizatriptan Benzoate 10 MG Oral Tab Take 1 tablet (10 mg total) by mouth as needed. 8 tablet 3    sertraline 50 MG Oral Tab Take 1 tablet (50 mg total) by mouth daily. 90 tablet 1    [START ON 7/31/2024] lisdexamfetamine (VYVANSE) 50 MG Oral Cap Take 1 capsule (50 mg total) by mouth daily. 30 capsule 0    fenofibrate micronized 134 MG Oral Cap Take 1 capsule (134 mg total) by mouth daily. 90 capsule 3    rosuvastatin 5 MG Oral Tab Take 1 tablet (5 mg total) by mouth nightly. 90 tablet 3    Norgestim-Eth Estrad Triphasic (TRI-SPRINTEC) 0.18/0.215/0.25 MG-35 MCG Oral Tab Take 1 tablet by mouth daily. 84 tablet 3    topiramate 25 MG Oral Tab Take 1 tablet (25 mg total) by mouth daily. 90 tablet 0    Calcium Carbonate Antacid (TUMS) 500 MG Oral Chew Tab Chew 2 tablets (1,000 mg total) by mouth 2 (two) times daily.      lisdexamfetamine (VYVANSE) 50 MG Oral Cap Take 1 capsule (50 mg total) by mouth daily. (Patient not taking: Reported on 6/28/2024) 30 capsule 0    [START ON 6/30/2024] lisdexamfetamine (VYVANSE) 50 MG Oral Cap Take  1 capsule (50 mg total) by mouth daily. (Patient not taking: Reported on 6/28/2024) 30 capsule 0    lisdexamfetamine (VYVANSE) 50 MG Oral Cap Take 1 capsule (50 mg total) by mouth every morning. (Patient not taking: Reported on 6/28/2024) 30 capsule 0     No current facility-administered medications on file prior to visit.       ASSESSMENT  Analyzed weight data:       Diagnoses and all orders for this visit:    Encounter for therapeutic drug level monitoring  -     amphetamine-dextroamphetamine (ADDERALL) 20 MG Oral Tab; Take 1 tablet (20 mg total) by mouth 2 (two) times daily.  -     amphetamine-dextroamphetamine (ADDERALL) 20 MG Oral Tab; Take 1 tablet (20 mg total) by mouth 2 (two) times daily.  -     amphetamine-dextroamphetamine (ADDERALL) 20 MG Oral Tab; Take 1 tablet (20 mg total) by mouth 2 (two) times daily.  -     semaglutide-weight management 0.25 MG/0.5ML Subcutaneous Solution Auto-injector; Inject 0.5 mL (0.25 mg total) into the skin once a week for 4 doses.    Class 1 obesity with body mass index (BMI) of 31.0 to 31.9 in adult, unspecified obesity type, unspecified whether serious comorbidity present  -     amphetamine-dextroamphetamine (ADDERALL) 20 MG Oral Tab; Take 1 tablet (20 mg total) by mouth 2 (two) times daily.  -     amphetamine-dextroamphetamine (ADDERALL) 20 MG Oral Tab; Take 1 tablet (20 mg total) by mouth 2 (two) times daily.  -     amphetamine-dextroamphetamine (ADDERALL) 20 MG Oral Tab; Take 1 tablet (20 mg total) by mouth 2 (two) times daily.  -     semaglutide-weight management 0.25 MG/0.5ML Subcutaneous Solution Auto-injector; Inject 0.5 mL (0.25 mg total) into the skin once a week for 4 doses.    Binge eating disorder  -     amphetamine-dextroamphetamine (ADDERALL) 20 MG Oral Tab; Take 1 tablet (20 mg total) by mouth 2 (two) times daily.  -     amphetamine-dextroamphetamine (ADDERALL) 20 MG Oral Tab; Take 1 tablet (20 mg total) by mouth 2 (two) times daily.  -      amphetamine-dextroamphetamine (ADDERALL) 20 MG Oral Tab; Take 1 tablet (20 mg total) by mouth 2 (two) times daily.  -     semaglutide-weight management 0.25 MG/0.5ML Subcutaneous Solution Auto-injector; Inject 0.5 mL (0.25 mg total) into the skin once a week for 4 doses.    Mixed hyperlipidemia  -     amphetamine-dextroamphetamine (ADDERALL) 20 MG Oral Tab; Take 1 tablet (20 mg total) by mouth 2 (two) times daily.  -     amphetamine-dextroamphetamine (ADDERALL) 20 MG Oral Tab; Take 1 tablet (20 mg total) by mouth 2 (two) times daily.  -     amphetamine-dextroamphetamine (ADDERALL) 20 MG Oral Tab; Take 1 tablet (20 mg total) by mouth 2 (two) times daily.  -     semaglutide-weight management 0.25 MG/0.5ML Subcutaneous Solution Auto-injector; Inject 0.5 mL (0.25 mg total) into the skin once a week for 4 doses.    Generalized anxiety disorder  -     amphetamine-dextroamphetamine (ADDERALL) 20 MG Oral Tab; Take 1 tablet (20 mg total) by mouth 2 (two) times daily.  -     amphetamine-dextroamphetamine (ADDERALL) 20 MG Oral Tab; Take 1 tablet (20 mg total) by mouth 2 (two) times daily.  -     amphetamine-dextroamphetamine (ADDERALL) 20 MG Oral Tab; Take 1 tablet (20 mg total) by mouth 2 (two) times daily.  -     semaglutide-weight management 0.25 MG/0.5ML Subcutaneous Solution Auto-injector; Inject 0.5 mL (0.25 mg total) into the skin once a week for 4 doses.        PLAN  Initial Weight: 186lbs  Initial Weight Date: 4/21/23  Today's Weight: 188lbs  5% Goal: 9.3lbs  10% Goal: 18.6lbs  Total Weight Loss: +7lbs/Net gain 2lbs    Discontinue vyvanse  Will begin adderall - discussed side effects including but not limited to:( elevated BP, tremor, anxiety, headache, constipation and serious side effects including arrhythmia and cad ) patient verbalized understanding agrees with the plan  Will begin Wegovy 0.25mg weekly - denies any personal or family history of pancreatitis, pancreatic cancer, thyroid cancer, MEN2 - discussed MOA,  advised side effects and adverse effects of medication.  HLD - stable on current medication rosuvastatin, will continue, will continue to work on lifestyle modifications  Continue to work on mindful eating  Mood is stable on current medications, continue to work on stress management  Consistency with logging foods - protein and produce  Incorporate strength/resistance training  Nutrition: low carb diet/ recommended to eat breakfast daily/ regular protein intake  Medication use and side effects reviewed with patient.  Medication contraindications: n/a  Follow up with dietitian and psychologist as recommended.  Discussed the role of sleep and stress in weight management.  Counseled on comprehensive weight loss plan including attention to nutrition, exercise and behavior/stress management for success. See patient instruction below for more details.  Discussed strategies to overcome barriers to successful weight loss and weight maintenance  FITTE: ACSM recommendations (150-300 minutes/ week in active weight loss)   Weight Loss consent to treat reviewed and signed       NOTE TO PATIENT: The 21st Century Cures Act makes clinical notes like these available to patients in the interest of transparency. Clinical notes are medical documents used by physicians and care providers to communicate with each other. These documents include medical language and terminology, abbreviations, and treatment information that may sound technical and at times possibly unfamiliar. In addition, at times, the verbiage may appear blunt or direct. These documents are one tool providers use to communicate relevant information and clinical opinions of the care providers in a way that allows common understanding of the clinical context.     There are no Patient Instructions on file for this visit.    No follow-ups on file.    Patient verbalizes understanding.    Laura Love PA-C  6/28/2024

## 2024-07-01 ENCOUNTER — TELEPHONE (OUTPATIENT)
Dept: INTERNAL MEDICINE CLINIC | Facility: CLINIC | Age: 44
End: 2024-07-01

## 2024-07-08 NOTE — TELEPHONE ENCOUNTER
There are a few options    Compound semaglutide, not FDA approved, out of pocket cost  I know we wrote for topamax before, but I do not think patient is taking that, we could try thath  We could also try metformin, used for DM, prediabetes, insulin resistance, PCOS, works to delay gastric emptying so you feel full for longer, and it helps with leptin resistance    Let me know her thoughts

## 2024-07-10 RX ORDER — METFORMIN HYDROCHLORIDE 750 MG/1
750 TABLET, EXTENDED RELEASE ORAL DAILY
Qty: 90 TABLET | Refills: 0 | Status: SHIPPED | OUTPATIENT
Start: 2024-07-10

## 2024-07-17 ENCOUNTER — OFFICE VISIT (OUTPATIENT)
Dept: FAMILY MEDICINE CLINIC | Facility: CLINIC | Age: 44
End: 2024-07-17
Payer: COMMERCIAL

## 2024-07-17 VITALS
HEIGHT: 65 IN | BODY MASS INDEX: 30.99 KG/M2 | OXYGEN SATURATION: 100 % | WEIGHT: 186 LBS | SYSTOLIC BLOOD PRESSURE: 126 MMHG | DIASTOLIC BLOOD PRESSURE: 84 MMHG | HEART RATE: 82 BPM | TEMPERATURE: 97 F | RESPIRATION RATE: 18 BRPM

## 2024-07-17 DIAGNOSIS — F41.1 GENERALIZED ANXIETY DISORDER: ICD-10-CM

## 2024-07-17 DIAGNOSIS — N95.1 PERIMENOPAUSAL: ICD-10-CM

## 2024-07-17 DIAGNOSIS — E78.2 MIXED HYPERLIPIDEMIA: Primary | ICD-10-CM

## 2024-07-17 DIAGNOSIS — L70.8 OTHER ACNE: ICD-10-CM

## 2024-07-17 DIAGNOSIS — L65.9 HAIR LOSS: ICD-10-CM

## 2024-07-17 DIAGNOSIS — E55.9 VITAMIN D DEFICIENCY: ICD-10-CM

## 2024-07-17 DIAGNOSIS — E66.09 CLASS 1 OBESITY DUE TO EXCESS CALORIES WITHOUT SERIOUS COMORBIDITY WITH BODY MASS INDEX (BMI) OF 30.0 TO 30.9 IN ADULT: ICD-10-CM

## 2024-07-17 DIAGNOSIS — R63.2 BINGE EATING: ICD-10-CM

## 2024-07-17 PROCEDURE — 3008F BODY MASS INDEX DOCD: CPT | Performed by: FAMILY MEDICINE

## 2024-07-17 PROCEDURE — 99214 OFFICE O/P EST MOD 30 MIN: CPT | Performed by: FAMILY MEDICINE

## 2024-07-17 PROCEDURE — 3074F SYST BP LT 130 MM HG: CPT | Performed by: FAMILY MEDICINE

## 2024-07-17 PROCEDURE — 3079F DIAST BP 80-89 MM HG: CPT | Performed by: FAMILY MEDICINE

## 2024-07-17 RX ORDER — FENOFIBRATE 134 MG/1
134 CAPSULE ORAL DAILY
Qty: 90 CAPSULE | Refills: 1 | Status: SHIPPED | OUTPATIENT
Start: 2024-07-17

## 2024-07-17 RX ORDER — ROSUVASTATIN CALCIUM 5 MG/1
5 TABLET, COATED ORAL NIGHTLY
Qty: 90 TABLET | Refills: 1 | Status: SHIPPED | OUTPATIENT
Start: 2024-07-17

## 2024-07-17 RX ORDER — ERGOCALCIFEROL 1.25 MG/1
50000 CAPSULE ORAL WEEKLY
Qty: 12 CAPSULE | Refills: 0 | Status: SHIPPED | OUTPATIENT
Start: 2024-07-17

## 2024-07-17 NOTE — PROGRESS NOTES
Renata Bravo is a 43 year old female.  Chief Complaint   Patient presents with    Anxiety     F/u on anxiety meds      HPI:   Renata Bravo is a 43 year old female seen foe follow up and medication refill.    Anxiety: Patient states mood has been stable with the current dose of sertraline.  Denies any side effects to the medication. + stress as her son has behavioral problems and job has been stressful.    TERRANCE-7 Scale       Feeling nervous, anxious, or on edge: Several days  Not being able to stop or control worrying: Not at all  Worrying too much about different things   : Not at all  Trouble relaxing: Not at all  Being so restless that it's hard to sit still: Not at all  Becoming easily annoyed or irritable: Several days  Feeling afraid as if something awful might happen: Not at all    TERRANCE 7 Total Score: 2  If you checked off any problems, how difficult have these made it for you to do your work, take care of things at home, or get along with other people?: Not difficult at all         PHQ9 Scale     1. Little interest or pleasure in doing things: Not at all  2. Feeling down, depressed, or hopeless: Not at all  3. Trouble falling or staying asleep, or sleeping too much: Not at all  4. Feeling tired or having little energy: Several days  5.    6. Feeling bad about yourself - or that you are a failure or have let yourself or your family down: Not at all  7. Trouble concentrating on things, such as reading the newspaper or watching television: Not at all  8. Moving or speaking so slowly that other people could have noticed. Or the opposite - being so fidgety or restless that you have been moving around a lot more than usual: Not at all  9. Thoughts that you would be better off dead, or of hurting yourself in some way: Not at all     If you checked off any problems, how difficult have these problems made it for you to do your work, take care of things at home, or get along with other people?: Not difficult at all         Compliant with her cholesterol medication, partial compliance with diet,states exercising by doing the brisk walk daily.  has been eating late at night due to stress, states once her son is in bed feels like it is her time and will eat anything, gained weight,  went to weight loss clinic and was started on Vyvanse and it is helping.    Migraines are stable, does get up headache with the weather change and before her menstrual cycle.    Patient complaining of increased hair loss, acne on her face, hot flashes and mood changes over the past 6 to 8 months.  States even though she is on the OCP her menstrual cycle can be heavy or light and is not regular like it used to be before.    Patient  was started on Adderall and metformin for weight loss and binge eating.    ALLERGY:   No Known Allergies    MEDICATIONS:     Current Outpatient Medications   Medication Sig Dispense Refill    fenofibrate micronized 134 MG Oral Cap Take 1 capsule (134 mg total) by mouth daily. 90 capsule 1    rosuvastatin 5 MG Oral Tab Take 1 tablet (5 mg total) by mouth nightly. 90 tablet 1    ergocalciferol 1.25 MG (72275 UT) Oral Cap Take 1 capsule (50,000 Units total) by mouth once a week. 12 capsule 0    famotidine 40 MG Oral Tab Take 1 tablet (40 mg total) by mouth daily. 90 tablet 0    metFORMIN  MG Oral Tablet 24 Hr Take 1 tablet (750 mg total) by mouth daily. 90 tablet 0    amphetamine-dextroamphetamine (ADDERALL) 20 MG Oral Tab Take 1 tablet (20 mg total) by mouth 2 (two) times daily. 60 tablet 0    [START ON 7/29/2024] amphetamine-dextroamphetamine (ADDERALL) 20 MG Oral Tab Take 1 tablet (20 mg total) by mouth 2 (two) times daily. 60 tablet 0    [START ON 8/29/2024] amphetamine-dextroamphetamine (ADDERALL) 20 MG Oral Tab Take 1 tablet (20 mg total) by mouth 2 (two) times daily. 60 tablet 0    Rizatriptan Benzoate 10 MG Oral Tab Take 1 tablet (10 mg total) by mouth as needed. 8 tablet 3    sertraline 50 MG  Oral Tab Take 1 tablet (50 mg total) by mouth daily. 90 tablet 1    Norgestim-Eth Estrad Triphasic (TRI-SPRINTEC) 0.18/0.215/0.25 MG-35 MCG Oral Tab Take 1 tablet by mouth daily. 84 tablet 3    Calcium Carbonate Antacid (TUMS) 500 MG Oral Chew Tab Chew 2 tablets (1,000 mg total) by mouth 2 (two) times daily.        Past Medical History:    Allergic rhinitis    Anxiety    Bloating    Depression    Depressive disorder, not elsewhere classified    Diarrhea, unspecified    Dysmenorrhea    Esophageal reflux    Fatigue    Food intolerance    Frequent use of laxatives    Generalized anxiety disorder    Headache disorder    Heartburn    Heavy menses    Hemorrhoids    History of depression    Indigestion    Iron deficiency anemia secondary to inadequate dietary iron intake    Mixed hyperlipidemia    Night sweats    Ovarian cyst    Panic disorder without agoraphobia    Pap smear for cervical cancer screening    wnl    Weight gain      Social History:  Social History     Socioeconomic History    Marital status:    Tobacco Use    Smoking status: Never    Smokeless tobacco: Never   Vaping Use    Vaping status: Never Used   Substance and Sexual Activity    Alcohol use: Yes     Alcohol/week: 3.0 standard drinks of alcohol     Types: 3 Cans of beer per week     Comment: OCC, Socially 2-3 beers    Drug use: Never    Sexual activity: Yes     Partners: Male     Birth control/protection: OCP   Other Topics Concern    Caffeine Concern Yes    Stress Concern No    Weight Concern Yes    Special Diet No    Exercise Yes    Seat Belt Yes        REVIEW OF SYSTEMS:   GENERAL HEALTH: feels well otherwise  SKIN: denies any unusual skin lesions or rashes  RESPIRATORY: denies shortness of breath with exertion  CARDIOVASCULAR: denies chest pain on exertion  NEURO: as per HPI  PSYCH: as per HPI    EXAM:   /84   Pulse 82   Temp 96.5 °F (35.8 °C) (Temporal)   Resp 18   Ht 5' 5\" (1.651 m)   Wt 186 lb (84.4 kg)   LMP 07/04/2024  (Approximate)   SpO2 100%   BMI 30.95 kg/m²   GENERAL: well developed, well nourished,in no apparent distress  NECK: supple,no adenopathy,no bruits  LUNGS: clear to auscultation  CARDIO: RRR without murmur  NEURO: AAO X 3, no focal motor or sensory deficits  PSYCH: well groomed, appropriate mood and affect.    Component      Latest Ref Rng 6/19/2024   WBC      4.0 - 11.0 x10(3) uL 7.2    RBC      3.80 - 5.30 x10(6)uL 4.60    Hemoglobin      12.0 - 16.0 g/dL 13.1    Hematocrit      35.0 - 48.0 % 41.3    Platelet Count      150.0 - 450.0 10(3)uL 359.0    MCV      80.0 - 100.0 fL 89.8    MCH      26.0 - 34.0 pg 28.5    MCHC      31.0 - 37.0 g/dL 31.7    RDW      % 13.6    Prelim Neutrophil Abs      1.50 - 7.70 x10 (3) uL 3.95    Neutrophils Absolute      1.50 - 7.70 x10(3) uL 3.95    Lymphocytes Absolute      1.00 - 4.00 x10(3) uL 2.48    Monocytes Absolute      0.10 - 1.00 x10(3) uL 0.28    Eosinophils Absolute      0.00 - 0.70 x10(3) uL 0.35    Basophils Absolute      0.00 - 0.20 x10(3) uL 0.07    Immature Granulocyte Absolute      0.00 - 1.00 x10(3) uL 0.02    Neutrophils %      % 55.2    Lymphocytes %      % 34.7    Monocytes %      % 3.9    Eosinophils %      % 4.9    Basophils %      % 1.0    Immature Granulocyte %      % 0.3    Glucose      70 - 99 mg/dL 88    Sodium      136 - 145 mmol/L 135 (L)    Potassium      3.5 - 5.1 mmol/L 4.4    Chloride      98 - 112 mmol/L 104    Carbon Dioxide, Total      21.0 - 32.0 mmol/L 26.0    ANION GAP      0 - 18 mmol/L 5    BUN      9 - 23 mg/dL 8 (L)    CREATININE      0.55 - 1.02 mg/dL 0.72    CALCIUM      8.5 - 10.1 mg/dL 9.1    CALCULATED OSMOLALITY      275 - 295 mOsm/kg 278    EGFR      >=60 mL/min/1.73m2 106    AST (SGOT)      15 - 37 U/L 10 (L)    ALT (SGPT)      13 - 56 U/L 16    ALKALINE PHOSPHATASE      37 - 98 U/L 86    Total Bilirubin      0.1 - 2.0 mg/dL 0.4    PROTEIN, TOTAL      6.4 - 8.2 g/dL 6.9    Albumin      3.4 - 5.0 g/dL 3.5    Globulin      2.8 - 4.4  g/dL 3.4    A/G Ratio      1.0 - 2.0  1.0    Patient Fasting for CMP? Yes    Cholesterol, Total      <200 mg/dL 163    HDL Cholesterol      40 - 59 mg/dL 65 (H)    Triglycerides      30 - 149 mg/dL 178 (H)    LDL Cholesterol Calc      <100 mg/dL 69    VLDL      0 - 30 mg/dL 27    NON-HDL CHOLESTEROL      <130 mg/dL 98    Patient Fasting for Lipid? Yes    TSH      0.358 - 3.740 mIU/mL 1.710    VITAMIN D, 25-OH, TOTAL      30.0 - 100.0 ng/mL 29.3 (L)       Legend:  (L) Low  (H) High  ASSESSMENT AND PLAN:   Renata was seen today for anxiety.    Diagnoses and all orders for this visit:    Mixed hyperlipidemia controlled  -     fenofibrate micronized 134 MG Oral Cap; Take 1 capsule (134 mg total) by mouth daily.  -     rosuvastatin 5 MG Oral Tab; Take 1 tablet (5 mg total) by mouth nightly.  -     continue the same dose of medication  -     limit saturated fats and cholesterol in diet.    Hair loss  -     Derm Referral - In Network    Other acne  -     Derm Referral - In Network    Vitamin D deficiency  -     ergocalciferol 1.25 MG (67228 UT) Oral Cap; Take 1 capsule (50,000 Units total) by mouth once a week.    Perimenopausal       -  reassured patient    Generalized anxiety disorder controlled       - continue the same dose of sertraline.       - medication refilled last month    Class 1 obesity due to excess calories without serious comorbidity with body mass index (BMI) of 30.0 to 30.9 in adult        - continue healthy diet and exercise         - continue medications as prescribed by weight loss.    Binge eating      - continue adderall    Encounter took 30 min including taking history, performing physical exam, reviewing external consult notes, counseling and educating patient, answering patient questions and documenting in EHR.       The 21st Century Cures Act makes medical notes like these available to patients in the interest of transparency. Please be advised this is a medical document. Medical documents are  intended to carry relevant information, facts as evident, and the clinical opinion of the practitioner. The medical note is intended as peer to peer communication and may appear blunt or direct. It is written in medical language and may contain abbreviations or verbiage that are unfamiliar.

## 2024-08-12 DIAGNOSIS — G43.829 MENSTRUAL MIGRAINE WITHOUT STATUS MIGRAINOSUS, NOT INTRACTABLE: ICD-10-CM

## 2024-08-15 DIAGNOSIS — Z51.81 ENCOUNTER FOR THERAPEUTIC DRUG LEVEL MONITORING: ICD-10-CM

## 2024-08-15 DIAGNOSIS — F50.81 BINGE EATING DISORDER: ICD-10-CM

## 2024-08-15 DIAGNOSIS — F41.1 GENERALIZED ANXIETY DISORDER: ICD-10-CM

## 2024-08-15 DIAGNOSIS — E66.9 CLASS 1 OBESITY WITH BODY MASS INDEX (BMI) OF 31.0 TO 31.9 IN ADULT, UNSPECIFIED OBESITY TYPE, UNSPECIFIED WHETHER SERIOUS COMORBIDITY PRESENT: ICD-10-CM

## 2024-08-15 DIAGNOSIS — E78.2 MIXED HYPERLIPIDEMIA: ICD-10-CM

## 2024-08-15 RX ORDER — RIZATRIPTAN BENZOATE 10 MG/1
10 TABLET ORAL AS NEEDED
Qty: 8 TABLET | Refills: 3 | Status: SHIPPED | OUTPATIENT
Start: 2024-08-15

## 2024-08-15 RX ORDER — DEXTROAMPHETAMINE SACCHARATE, AMPHETAMINE ASPARTATE, DEXTROAMPHETAMINE SULFATE AND AMPHETAMINE SULFATE 5; 5; 5; 5 MG/1; MG/1; MG/1; MG/1
20 TABLET ORAL 2 TIMES DAILY
Qty: 60 TABLET | Refills: 0 | Status: CANCELLED | OUTPATIENT
Start: 2024-08-15 | End: 2024-09-14

## 2024-08-15 NOTE — TELEPHONE ENCOUNTER
Please review.  Patient is using 8 tablets every 2 weeks, consistently since April.     Requested Prescriptions   Pending Prescriptions Disp Refills    RIZATRIPTAN BENZOATE 10 MG Oral Tab [Pharmacy Med Name: RIZATRIPTAN 10MG TABLETS] 8 tablet 3     Sig: TAKE 1 TABLET BY MOUTH AS NEEDED       Neurology Medications Passed - 8/12/2024  9:33 AM        Passed - In person appointment or virtual visit in the past 6 mos or appointment in next 3 mos     Recent Outpatient Visits              4 weeks ago Mixed hyperlipidemia    [address]    Office Visit    1 month ago Chronic GERD    [facility],  [facility] Andrew Bianchi DO    Office Visit    1 month ago Encounter for therapeutic drug level monitoring    [facility], [address] [place] [address] Laura Love PA-C    Office Visit    7 months ago Routine general medical examination at a health care facility    [address] Ellie Wray MD    Office Visit    10 months ago Seasonal allergic rhinitis due to pollen    [address] Ellie Wray MD    Office Visit          Future Appointments         Provider Department Appt Notes    In 2 months Laura Love PA-C [address] Follow up requested by Laura Love    In 5 months Ellie Wray MD [address] Follow up                       Future Appointments         Provider Department Appt Notes    In 2 months Laura Love PA-C [address] Follow up requested by Laura Love    In 5 months Ellie Wray MD [address] Follow up          Recent Outpatient Visits              4 weeks ago Mixed hyperlipidemia    [facility],  [address] [place] [address], Ellie Polanco MD    Office Visit    1 month ago Chronic GERD    [facility],  [facility] Andrew Bianchi DO    Office Visit    1 month ago Encounter for therapeutic drug level monitoring    [facility], [address] [place] [address] Laura Love PA-C    Office Visit    7 months ago Routine general medical examination at a health care facility    [facility], [address] [place] [address], Ellie Polanco MD    Office Visit    10 months ago Seasonal allergic rhinitis due to pollen    [facility], [address] [place] [address], Ellie Polanco MD    Office Visit

## 2024-08-19 NOTE — TELEPHONE ENCOUNTER
Requesting   Requested Prescriptions     Pending Prescriptions Disp Refills    lisdexamfetamine (VYVANSE) 50 MG Oral Cap 30 capsule 0     Sig: Take 1 capsule (50 mg total) by mouth every morning.      LOV: 9/5/23 ( tele)  RTC:   Last Relevant Labs:   Filled: 3/12/24 #30 with 0 refills    Future Appointments   Date Time Provider Department Center   6/18/2024 12:40 PM Ellie Wray MD [facility]   6/28/2024 12:20 PM Laura Love PA-C [facility]      
facility-administered medications for this visit.       No Known Allergies    Past Surgical History:   Procedure Laterality Date    GYNECOLOGIC CRYOSURGERY         Social History     Tobacco Use    Smoking status: Never    Smokeless tobacco: Never   Vaping Use    Vaping status: Never Used   Substance Use Topics    Alcohol use: Yes     Comment: rarely 1-2 times monthly    Drug use: No       Family History   Problem Relation Age of Onset    High Blood Pressure Mother     Breast Cancer Maternal Grandmother 88    Cancer Paternal Grandfather         Prostate    Cancer Maternal Aunt         breast cancer       /78 (Site: Left Upper Arm, Position: Sitting, Cuff Size: Medium Adult)   Pulse 89   Temp 97.1 °F (36.2 °C) (Temporal)   Ht 1.702 m (5' 7\")   Wt 60.6 kg (133 lb 9.6 oz)   SpO2 97%   BMI 20.92 kg/m²     Physical Exam  Constitutional:       Appearance: Normal appearance.   HENT:      Head: Normocephalic.   Cardiovascular:      Rate and Rhythm: Normal rate and regular rhythm.      Pulses: Normal pulses.      Heart sounds: Normal heart sounds.   Pulmonary:      Effort: Pulmonary effort is normal.      Breath sounds: Normal breath sounds.   Abdominal:      General: Bowel sounds are normal.      Palpations: Abdomen is soft.   Musculoskeletal:         General: Normal range of motion.   Skin:     General: Skin is warm and dry.   Neurological:      Mental Status: She is alert and oriented to person, place, and time.   Psychiatric:         Mood and Affect: Mood normal.         Behavior: Behavior normal.         ASSESSMENT/PLAN:  1. Weight gain    - phentermine (ADIPEX-P) 37.5 MG tablet; Take 1 tablet by mouth every morning (before breakfast) for 30 days. Max Daily Amount: 37.5 mg  Dispense: 30 tablet; Refill: 0         Return in about 4 weeks (around 9/10/2024) for weight management.    Patient offered educational handouts and has had all questions answered.  Patient voices understanding and agrees to plans along

## 2024-08-22 ENCOUNTER — TELEPHONE (OUTPATIENT)
Dept: INTERNAL MEDICINE CLINIC | Facility: CLINIC | Age: 44
End: 2024-08-22

## 2024-08-22 NOTE — TELEPHONE ENCOUNTER
Rx updated to include: take at onset of headache, may repeat dose in 2 hours. Dispense #12.   Rx resent to pharmacy.

## 2024-08-22 NOTE — TELEPHONE ENCOUNTER
[facility] called requesting additional information for Rizatriptan Benzoate 10 mg rx. They are asking if ok to include \"take at onset of headache, may repeat dose in 2 hours.\" They also request a maximum dose per day. Medication comes in a pack of 12, not 8.

## 2024-09-06 DIAGNOSIS — Z30.41 SURVEILLANCE FOR BIRTH CONTROL, ORAL CONTRACEPTIVES: ICD-10-CM

## 2024-09-06 NOTE — TELEPHONE ENCOUNTER
LOV 7/17/24     Medication Detail      Medication Quantity Refills Start End   Norgestim-Eth Estrad Triphasic (TRI-SPRINTEC) 0.18/0.215/0.25 MG-35 MCG Oral Tab 84 tablet 3 12/20/2023 --   Sig:   Take 1 tablet by mouth daily.     Route:   Oral     Order #:   [id number]       Routing refill request to Dr. ALFREDO for review

## 2024-09-07 RX ORDER — NORGESTIMATE AND ETHINYL ESTRADIOL 7DAYSX3 28
1 KIT ORAL DAILY
Qty: 84 TABLET | Refills: 0 | Status: SHIPPED | OUTPATIENT
Start: 2024-09-07

## 2024-09-19 NOTE — TELEPHONE ENCOUNTER
Dr. Verna Sweeney, patient's insurance will only pay for 30 capsules in 30 days. You ordered Fenofibrate 67 mg take 2 capsules by mouth daily back on 02-15-21. Could we change the dose and order one capsule daily instead? Preoperative Evaluation  [address] [address]  Phone: [phone]  Primary Provider: June Prieto MD  Pre-op Performing Provider: Mckenzie Lee MD  Sep 19, 2024             9/19/2024   Surgical Information   What procedure is being done? lunpactomy breast using radiofrequency   Facility or Hospital where procedure/surgery will be performed: [facility]   Who is doing the procedure / surgery? dr cheryl urban   Date of surgery / procedure: sept 23 2024   Time of surgery / procedure: 1030 am   Where do you plan to recover after surgery? at home with family        Fax number for surgical facility: Note does not need to be faxed, will be available electronically in Epic.    Assessment & Plan     The proposed surgical procedure is considered LOW risk.    Assessment and Plan  1. Preoperative clearance  2. Invasive ductal carcinoma of breast, female, right (H)  Patient is here for preoperative clearance of LUMPECTOMY, BREAST, LOCALIZED USING RADIOFREQUENCY IDENTIFICATION & sentinel lymph node biopsy Under general anaesthesia for her Invasive ductal carcinoma of breast, female, right (H)  -Does have risk factors of intermittent cough since 7/2024 had COVID and has been coughing spells.  No acute signs of infection per my exam.  - XR Chest 2 Views; Future  - EKG 12-lead complete w/read - Clinics  - Basic metabolic panel  (Ca, Cl, CO2, Creat, Gluc, K, Na, BUN); Future  - CBC with platelets; Future  - Basic metabolic panel  (Ca, Cl, CO2, Creat, Gluc, K, Na, BUN)  - CBC with platelets    3. Post-COVID chronic cough  New problem added to patient problem list as she endorses that after she recovered from COVID in July 2024.  Discussed most further appointment on possible cold-induced bronchospasm which could be causing this as well as need for PFTs in future.  For now we will go ahead and place on albuterol as needed for symptoms, will  check x-ray as it was never done in the past.  Patient understood and with the plan on the follow-up.  -Respiratory precautions given on the risks and recommendations below.  - albuterol (PROAIR HFA/PROVENTIL HFA/VENTOLIN HFA) 108 (90 Base) MCG/ACT inhaler; Inhale 2 puffs into the lungs every 6 hours as needed for shortness of breath, wheezing or cough.  Dispense: 18 g; Refill: 1  - XR Chest 2 Views; Future    4. Gastroesophageal reflux disease without esophagitis  Chronic stable, no acute concerns at this time.    5. Need for vaccination  Patient declined tetanus vaccine offered in spite of explaining the risks of upcoming surgery and she is due for 1 at this time.         Please note that this note consists of symbols derived from keyboarding, dictation and/or voice recognition software. As a result, there may be errors in the script that have gone undetected. Please consider this when interpreting information found in this chart.    Patient Instructions   As discussed, please do the pertinent work up needed at this time.     Will start on Albuterol as needed for cough   Will check X ray given the ongoing cough and never had X ray in the past     =================================================    How to Take Your Medication Before Surgery  Preoperative Medication Instructions  Antiplatelet or Anticoagulation Medication Instructions   - Patient is on no antiplatelet or anticoagulation medications.    Additional Medication Instructions  Take all scheduled medications on the day of surgery EXCEPT for modifications listed below:   - Herbal medications and vitamins: DO NOT TAKE 14 days prior to surgery.       Patient Education  Preparing for Your Surgery  Getting started  A nurse will call you to review your health history and instructions. They will give you an arrival time based on your scheduled surgery time. Please be ready to share:  Your doctor's clinic name and phone number  Your medical, surgical, and  anesthesia history  A list of allergies and sensitivities  A list of medicines, including herbal treatments and over-the-counter drugs  Whether the patient has a legal guardian (ask how to send us the papers in advance)  Please tell us if you're pregnant--or if there's any chance you might be pregnant. Some surgeries may injure a fetus (unborn baby), so they require a pregnancy test. Surgeries that are safe for a fetus don't always need a test, and you can choose whether to have one.   If you have a child who's having surgery, please ask for a copy of Preparing for Your Child's Surgery.    Preparing for surgery  Within 10 to 30 days of surgery: Have a pre-op exam (sometimes called an H&P, or History and Physical). This can be done at a clinic or pre-operative center.  If you're having a , you may not need this exam. Talk to your care team.  At your pre-op exam, talk to your care team about all medicines you take. If you need to stop any medicines before surgery, ask when to start taking them again.  We do this for your safety. Many medicines can make you bleed too much during surgery. Some change how well surgery (anesthesia) drugs work.  Call your insurance company to let them know you're having surgery. (If you don't have insurance, call [phone].)  Call your clinic if there's any change in your health. This includes signs of a cold or flu (sore throat, runny nose, cough, rash, fever). It also includes a scrape or scratch near the surgery site.  If you have questions on the day of surgery, call your hospital or surgery center.  Eating and drinking guidelines  For your safety: Unless your surgeon tells you otherwise, follow the guidelines below.  Eat and drink as usual until 8 hours before you arrive for surgery. After that, no food or milk.  Drink clear liquids until 2 hours before you arrive. These are liquids you can see through, like water, Gatorade, and Propel Water. They also include plain black  coffee and tea (no cream or milk), candy, and breath mints. You can spit out gum when you arrive.  If you drink alcohol: Stop drinking it the night before surgery.  If your care team tells you to take medicine on the morning of surgery, it's okay to take it with a sip of water.  Preventing infection  Shower or bathe the night before and morning of your surgery. Follow the instructions your clinic gave you. (If no instructions, use regular soap.)  Don't shave or clip hair near your surgery site. We'll remove the hair if needed.  Don't smoke or vape the morning of surgery. You may chew nicotine gum up to 2 hours before surgery. A nicotine patch is okay.  Note: Some surgeries require you to completely quit smoking and nicotine. Check with your surgeon.  Your care team will make every effort to keep you safe from infection. We will:  Clean our hands often with soap and water (or an alcohol-based hand rub).  Clean the skin at your surgery site with a special soap that kills germs.  Give you a special gown to keep you warm. (Cold raises the risk of infection.)  Wear special hair covers, masks, gowns and gloves during surgery.  Give antibiotic medicine, if prescribed. Not all surgeries need antibiotics.  What to bring on the day of surgery  Photo ID and insurance card  Copy of your health care directive, if you have one  Glasses and hearing aids (bring cases)  You can't wear contacts during surgery  Inhaler and eye drops, if you use them (tell us about these when you arrive)  CPAP machine or breathing device, if you use them  A few personal items, if spending the night  If you have . . .  A pacemaker, ICD (cardiac defibrillator) or other implant: Bring the ID card.  An implanted stimulator: Bring the remote control.  A legal guardian: Bring a copy of the certified (court-stamped) guardianship papers.  Please remove any jewelry, including body piercings. Leave jewelry and other valuables at home.  If you're going home the  day of surgery  You must have a responsible adult drive you home. They should stay with you overnight as well.  If you don't have someone to stay with you, and you aren't safe to go home alone, we may keep you overnight. Insurance often won't pay for this.  After surgery  If it's hard to control your pain or you need more pain medicine, please call your surgeon's office.  Questions?   If you have any questions for your care team, list them here: _________________________________________________________________________________________________________________________________________________________________________ ____________________________________ ____________________________________ ____________________________________  For informational purposes only. Not to replace the advice of your health care provider. Copyright   2003, 2019 [facility]. All rights reserved. Clinically reviewed by Radha Turner MD. SMART[employer] 925192 - REV 12/22.       Return in about 6 months (around 4/4/2025), or if symptoms worsen or fail to improve, for Preventative Visit.    Mckenzie Lee MD  [facility] [place]        Possible Sleep Apnea: Patient denies any symptoms    Risks and Recommendations  The patient has the following additional risks and recommendations for perioperative complications:   - Consult Hospitalist / IM to assist with post-op medical management  Pulmonary:    - Incentive spirometry post-op   - Consider Respiratory Therapy (Respiratory Care IP Consult) post-op    Preoperative Medication Instructions  Antiplatelet or Anticoagulation Medication Instructions   - Patient is on no antiplatelet or anticoagulation medications.    Additional Medication Instructions  Take all scheduled medications on the day of surgery EXCEPT for modifications listed below:   - Herbal medications and vitamins: DO NOT TAKE 14 days prior to surgery.    Recommendation  Approval given to proceed with proposed  procedure, without further diagnostic evaluation.      Edouard Giordano is a 54 year old, presenting for the following:  Pre-Op Exam (LUMPECTOMY, BREAST, LOCALIZED USING RADIOFREQUENCY IDENTIFICATION, )          9/19/2024     8:58 AM   Additional Questions   Roomed by Milka SHEARER related to upcoming procedure:     Patient is new to me, follows our group as PCP.  Here for preoperative clearance        9/19/2024   Pre-Op Questionnaire   Have you ever had a heart attack or stroke? No   Have you ever had surgery on your heart or blood vessels, such as a stent placement, a coronary artery bypass, or surgery on an artery in your head, neck, heart, or legs? No   Do you have chest pain with activity? No   Do you have a history of heart failure? No   Do you currently have a cold, bronchitis or symptoms of other infection? No   Do you have a cough, shortness of breath, or wheezing? (!) YES    Do you or anyone in your family have previous history of blood clots? No   Do you or does anyone in your family have a serious bleeding problem such as prolonged bleeding following surgeries or cuts? No   Have you ever had problems with anemia or been told to take iron pills? No   Have you had any abnormal blood loss such as black, tarry or bloody stools, or abnormal vaginal bleeding? No   Have you ever had a blood transfusion? No   Are you willing to have a blood transfusion if it is medically needed before, during, or after your surgery? Yes   Have you or any of your relatives ever had problems with anesthesia? No   Do you have sleep apnea, excessive snoring or daytime drowsiness? (!) YES   Do you have a CPAP machine? (!) NO    Do you have any artifical heart valves or other implanted medical devices like a pacemaker, defibrillator, or continuous glucose monitor? No   Do you have artificial joints? No   Are you allergic to latex? No        Health Care Directive  Patient does not have a Health Care Directive or Living Will:  N/A     Preoperative Review of    reviewed - no record of controlled substances prescribed.      Status of Chronic Conditions:  See problem list for active medical problems.  Problems all longstanding and stable, except as noted/documented.  See ROS for pertinent symptoms related to these conditions.    Patient Active Problem List    Diagnosis Date Noted    Invasive ductal carcinoma of breast, female, right (H) 09/10/2024     Priority: Medium    Nontraumatic complete tear of right rotator cuff 02/14/2022     Priority: Medium     Formatting of this note might be different from the original.   Added automatically from request for surgery [id number]      Colon cancer screening 04/18/2019     Priority: Medium    Nasal congestion 04/18/2019     Priority: Medium    Menopause present 04/18/2019     Priority: Medium    Family history of thyroid disease 07/13/2017     Priority: Medium     2 sisters , low thyroid       Uterine leiomyoma, unspecified location 07/13/2017     Priority: Medium     per previous u/s, asymptomatic       Hyperlipidemia LDL goal <130 03/16/2016     Priority: Medium    Gastroesophageal reflux disease without esophagitis 03/16/2016     Priority: Medium    Chest wall pain 03/16/2016     Priority: Medium    Vitamin D deficiency 03/16/2016     Priority: Medium    Irregular menstrual cycle 03/16/2016     Priority: Medium    Family history of diabetes mellitus (DM) 02/09/2015     Priority: Medium    Esophageal reflux 02/09/2015     Priority: Medium    Neck pain 08/25/2012     Priority: Medium    Sore throat, chronic 08/25/2012     Priority: Medium    Allergic rhinitis 08/22/2006     Priority: Medium     Problem list name updated by automated process. Provider to review      Dyspepsia and other specified disorders of function of stomach 05/22/2006     Priority: Medium      Past Medical History:   Diagnosis Date    Dyspepsia and other specified disorders of function of stomach 2003    pos H.pylori tx'ed     "Vitamin D deficiencies      Past Surgical History:   Procedure Laterality Date    LAPAROSCOPIC CHOLECYSTECTOMY      Cholecystectomy, Laparoscopic    ZZC  DELIVERY ONLY      , Low Cervical     Current Outpatient Medications   Medication Sig Dispense Refill    albuterol (PROAIR HFA/PROVENTIL HFA/VENTOLIN HFA) 108 (90 Base) MCG/ACT inhaler Inhale 2 puffs into the lungs every 6 hours as needed for shortness of breath, wheezing or cough. 18 g 1    fish oil-omega-3 fatty acids 1000 MG capsule Take 2 g by mouth daily      multivitamins w/minerals liquid          Allergies   Allergen Reactions    No Known Allergies         Social History     Tobacco Use    Smoking status: Never    Smokeless tobacco: Never   Substance Use Topics    Alcohol use: No     Family History   Problem Relation Age of Onset    Diabetes Mother     Hypertension Mother     Diabetes Father     Hypertension Father     C.A.D. Father          of MI at age of 70    Breast Cancer Maternal Grandmother         at age 65+, unsure ?    Breast Cancer Paternal Grandmother         55+    Cancer - colorectal No family hx of      History   Drug Use No             Review of Systems  Constitutional, HEENT, cardiovascular, pulmonary, GI, , musculoskeletal, neuro, skin, endocrine and psych systems are negative, except as otherwise noted.    Objective    /74   Pulse 71   Temp 97  F (36.1  C) (Temporal)   Resp 16   Ht 1.555 m (5' 1.22\")   Wt 66.9 kg (147 lb 6.4 oz)   LMP 2017 (Approximate)   SpO2 99%   BMI 27.65 kg/m     Estimated body mass index is 27.65 kg/m  as calculated from the following:    Height as of this encounter: 1.555 m (5' 1.22\").    Weight as of this encounter: 66.9 kg (147 lb 6.4 oz).  Physical Exam  GENERAL: alert and no distress  EYES: Eyes grossly normal to inspection, PERRL and conjunctivae and sclerae normal  HENT: ear canals and TM's normal, nose and mouth without ulcers or lesions  NECK: no " adenopathy, no asymmetry, masses, or scars  RESP: lungs clear to auscultation - no rales, rhonchi or wheezes  CV: regular rate and rhythm, normal S1 S2, no S3 or S4, no murmur, click or rub, no peripheral edema  ABDOMEN: soft, nontender, no hepatosplenomegaly, no masses and bowel sounds normal  MS: no gross musculoskeletal defects noted, no edema  SKIN: no suspicious lesions or rashes  NEURO: Normal strength and tone, mentation intact and speech normal  PSYCH: mentation appears normal, affect normal/bright    Recent Labs   Lab Test 04/01/24  0858   HGB 13.3         POTASSIUM 4.2   CR 0.74   A1C 5.6        Diagnostics  Labs pending at this time.  Results will be reviewed when available.  Recent Results (from the past 720 hour(s))   Surgical Pathology Exam    Collection Time: 09/04/24 10:24 AM   Result Value Ref Range    Case Report       Surgical Pathology Report                         Case: [id number]                                  Authorizing Provider:  Jeanette Fatima PA-C     Collected:           09/04/2024 10:24 AM          Ordering Location:     [facility]          Received:            09/04/2024 11:07 AM                                 [facility]                                                      Pathologist:           Taylor Mcpherson MD                                                             Specimen:    Breast, Right, Breast Biopsy Needle                                                        Final Diagnosis       A.  Right breast, 1:00, 8 cm from the nipple, 1.6 cm size, high suspicion mass, ultrasound-guided core biopsy:  -INVASIVE CARCINOMA OF NO SPECIAL TYPE (DUCTAL).  -Juanjose Grade  1  (Tubule score = 2, Nuclear score = 2, Mitotic score = 1).  -Size:  Involving all 3 tissue cores and measuring at least 9 mm in greatest linear extent.  -Lymphatic/Vascular Invasion:   Not identified.  -Ductal Carcinoma in situ (DCIS):   Cribriform type, nuclear grade 2,  "without necrosis.  -Lobular Carcinoma in situ (LCIS):   Not identified.  -Microcalcifications: Present, associated with invasive carcinoma.  -ESTROGEN RECEPTOR (ER): Positive, 100%, strong.  -PROGESTERONE RECEPTOR (AR): Positive, 100%, strong.  -HER2(IHC):  Score  1+.  -See Breast Biomarker Reporting Template        Synoptic Checklist       Breast Biomarker Reporting Template   BREAST BIOMARKER REPORTING TEMPLATE - A   Protocol posted: 12/13/2023         Test(s) Performed:            Estrogen Receptor (ER) Status:    Positive (greater than 10% of cells demonstrate nuclear positivity)           Percentage of Cells with Nuclear Positivity:    100 %          Average Intensity of Staining:    Strong         Test Type:    Food and Drug Administration (FDA) cleared (test / vendor): [place]         Primary Antibody:    SP1       Test(s) Performed:            Progesterone Receptor (PgR) Status:    Positive           Percentage of Cells with Nuclear Positivity:    100 %          Average Intensity of Staining:    Strong         Test Type:    Food and Drug Administration (FDA) cleared (test / vendor): [place]         Primary Antibody:    1E2       Test(s) Performed:            HER2 by Immunohistochemistry:    Negative (Score 1+)         Test Type:    Food and Drug Administration (FDA) cleared (test / vendor): [employer]         Primary Antibody:    4B5       Cold Ischemia and Fixation Times:    Meet requirements specified in latest version of the ASCO / CAP Guidelines       METHODS      Fixative:    Formalin       Image Analysis:    Not performed       Gross Description       A(1). Breast, Right, Breast Biopsy Needle:  The specimen is received in formalin, labeled with the patient's name, medical record number and other identifying information designated \"right breast ultrasound core biopsy, 1:00, 8.0 cm nipple, 1.6 cm in size, high suspicion mass likely IDC\". It consists of three fragments of white-tan indurated needle core " biopsy tissue ranging in size between 0.9 and 1.3 cm in length by 0.1-0.2 cm in diameter.  The fragments are submitted entirely in formalin wrapped in lens paper in 1 cassette.    Time collected: 10:24 AM on 9/4/2024  Time in formalin: 10:34 AM on 9/4/2024    SIRENA Rhodes(ASCP)CM 9/4/2024 11:13 AM       Microscopic Description       Microscopic examination was performed.  Results are incorporated into the final diagnosis.  All controls stained appropriately.      Intradepartmental consultation was obtained.        Special Stains       Immunohistochemistry for p63 was performed.  The immunostain confirms a component of ductal carcinoma in situ in a background of invasive ductal carcinoma.      Disclaimer         The following assays; ALK (D5F3), ER, HER2, PD-L1, BRAF, CD20, CD30 AZF, CD30 Formalin, MLH-1, MSH-2, MSH-6 and PMS-2, have not been validated on decalcified tissues. Results should be interpreted with caution given the possibility of false negative results on decalcified specimens.          MCRS Yes (A) N/A    Performing Labs       The technical component of this testing was completed at [facility].    Stain controls for all stains resulted within this report have been reviewed and show appropriate reactivity.       Case Images     CBC with platelets    Collection Time: 09/19/24  9:51 AM   Result Value Ref Range    WBC Count 5.1 4.0 - 11.0 10e3/uL    RBC Count 4.58 3.80 - 5.20 10e6/uL    Hemoglobin 13.2 11.7 - 15.7 g/dL    Hematocrit 38.5 35.0 - 47.0 %    MCV 84 78 - 100 fL    MCH 28.8 26.5 - 33.0 pg    MCHC 34.3 31.5 - 36.5 g/dL    RDW 12.8 10.0 - 15.0 %    Platelet Count 204 150 - 450 10e3/uL      EKG required for general anesthesia, ongoing respiratory symptoms of SOB and cough and not completed in the last 90 days.   EKG: appears normal, NSR, normal axis, normal intervals, no acute ST/T changes c/w ischemia, no LVH by voltage criteria,  unchanged from previous tracings    Revised Cardiac Risk Index (RCRI)  The patient has the following serious cardiovascular risks for perioperative complications:   - No serious cardiac risks = 0 points     RCRI Interpretation: 0 points: Class I (very low risk - 0.4% complication rate)         Signed Electronically by: Mckenzie Lee MD  A copy of this evaluation report is provided to the requesting physician.

## 2024-10-01 NOTE — TELEPHONE ENCOUNTER
Requesting metformin  mg  LOV: 6/28/24  RTC: not noted  Last Relevant Labs: no A1c  Filled: 7/10/24 #90 with 0 refills    Future Appointments   Date Time Provider Department Center   10/31/2024 12:20 PM Laura Love PA-C [id number] [address]   1/18/2025 11:40 AM Ellie Wray MD [facility] [address]

## 2024-10-02 RX ORDER — METFORMIN HYDROCHLORIDE 750 MG/1
750 TABLET, EXTENDED RELEASE ORAL DAILY
Qty: 90 TABLET | Refills: 0 | Status: SHIPPED | OUTPATIENT
Start: 2024-10-02

## 2024-10-29 ENCOUNTER — MED REC SCAN ONLY (OUTPATIENT)
Dept: FAMILY MEDICINE CLINIC | Facility: CLINIC | Age: 44
End: 2024-10-29

## 2024-10-31 ENCOUNTER — OFFICE VISIT (OUTPATIENT)
Facility: CLINIC | Age: 44
End: 2024-10-31
Payer: COMMERCIAL

## 2024-10-31 VITALS
RESPIRATION RATE: 16 BRPM | SYSTOLIC BLOOD PRESSURE: 120 MMHG | DIASTOLIC BLOOD PRESSURE: 88 MMHG | HEART RATE: 83 BPM | WEIGHT: 179 LBS | BODY MASS INDEX: 29.82 KG/M2 | OXYGEN SATURATION: 100 % | HEIGHT: 65 IN

## 2024-10-31 DIAGNOSIS — F50.819 BINGE EATING DISORDER, UNSPECIFIED SEVERITY: ICD-10-CM

## 2024-10-31 DIAGNOSIS — F41.1 GENERALIZED ANXIETY DISORDER: ICD-10-CM

## 2024-10-31 DIAGNOSIS — Z51.81 ENCOUNTER FOR THERAPEUTIC DRUG LEVEL MONITORING: Primary | ICD-10-CM

## 2024-10-31 DIAGNOSIS — E78.2 MIXED HYPERLIPIDEMIA: ICD-10-CM

## 2024-10-31 DIAGNOSIS — E66.3 OVERWEIGHT (BMI 25.0-29.9): ICD-10-CM

## 2024-10-31 PROCEDURE — 3079F DIAST BP 80-89 MM HG: CPT | Performed by: PHYSICIAN ASSISTANT

## 2024-10-31 PROCEDURE — 3074F SYST BP LT 130 MM HG: CPT | Performed by: PHYSICIAN ASSISTANT

## 2024-10-31 PROCEDURE — 99214 OFFICE O/P EST MOD 30 MIN: CPT | Performed by: PHYSICIAN ASSISTANT

## 2024-10-31 PROCEDURE — 3008F BODY MASS INDEX DOCD: CPT | Performed by: PHYSICIAN ASSISTANT

## 2024-10-31 RX ORDER — DEXTROAMPHETAMINE SACCHARATE, AMPHETAMINE ASPARTATE MONOHYDRATE, DEXTROAMPHETAMINE SULFATE AND AMPHETAMINE SULFATE 5; 5; 5; 5 MG/1; MG/1; MG/1; MG/1
20 CAPSULE, EXTENDED RELEASE ORAL 2 TIMES DAILY
COMMUNITY

## 2024-10-31 RX ORDER — DEXTROAMPHETAMINE SACCHARATE, AMPHETAMINE ASPARTATE, DEXTROAMPHETAMINE SULFATE AND AMPHETAMINE SULFATE 5; 5; 5; 5 MG/1; MG/1; MG/1; MG/1
20 TABLET ORAL 2 TIMES DAILY
Qty: 60 TABLET | Refills: 0 | Status: SHIPPED | OUTPATIENT
Start: 2025-01-01 | End: 2025-01-31

## 2024-10-31 RX ORDER — DEXTROAMPHETAMINE SACCHARATE, AMPHETAMINE ASPARTATE, DEXTROAMPHETAMINE SULFATE AND AMPHETAMINE SULFATE 5; 5; 5; 5 MG/1; MG/1; MG/1; MG/1
20 TABLET ORAL 2 TIMES DAILY
Qty: 60 TABLET | Refills: 0 | Status: SHIPPED | OUTPATIENT
Start: 2024-10-31 | End: 2024-11-30

## 2024-10-31 RX ORDER — DEXTROAMPHETAMINE SACCHARATE, AMPHETAMINE ASPARTATE, DEXTROAMPHETAMINE SULFATE AND AMPHETAMINE SULFATE 5; 5; 5; 5 MG/1; MG/1; MG/1; MG/1
20 TABLET ORAL 2 TIMES DAILY
Qty: 60 TABLET | Refills: 0 | Status: SHIPPED | OUTPATIENT
Start: 2024-12-01 | End: 2024-12-31

## 2024-10-31 RX ORDER — METFORMIN HYDROCHLORIDE 750 MG/1
750 TABLET, EXTENDED RELEASE ORAL DAILY
Qty: 90 TABLET | Refills: 0 | Status: SHIPPED | OUTPATIENT
Start: 2024-10-31

## 2024-10-31 NOTE — PROGRESS NOTES
HISTORY OF PRESENT ILLNESS  Chief Complaint   Patient presents with    Weight Check     -9lbs       Renata Bravo is a 44 year old female here for follow up in medical weight loss program.   -9lbs  Compliant on adderall, metformin  Denies chest pain, shortness of breath, dizziness, blurred vision, headache, paresthesia, nausea/vomiting.   Feels like creating new healthier habits  More in control   Doesn't feel as winded, tired, bloated, improvement in heartburn    Exercise/Activity: more active, on the weekends walking, needs to add it into week day schedule  Nutrition: 24 hour food log reviewed, eating regular meals, +protein  Stress: 8/10 - managing the best she can  Sleep: no problems    [facility] Follow Up    General Information  Success Moment: I haven't had an evening binge in 2 weeks  Challenging Moment: I'm feeling successful  Nutrition Recall  Breakfast: Oatmeal Lunch: Pb & j, chips   Dinner: Spaghetti Snacks: Cookies   Fluids: Diet coke,water Dining Out: 3   Exercise   Patient stated exercises # days/week: 3  Patient stated perceived level of   exertion: 2 Anaerobic Days: 0   Aerobic Days: 2   Patient stated average level of stress: 8  Sleep   Patient stated # hours uninterrupted sleep: 7   Patient stated feels   restful: Yes      Goals: Med refill              Wt Readings from Last 6 Encounters:   10/31/24 179 lb (81.2 kg)   07/17/24 186 lb (84.4 kg)   07/16/24 185 lb 6.4 oz (84.1 kg)   06/28/24 188 lb (85.3 kg)   12/20/23 186 lb (84.4 kg)   09/30/23 181 lb (82.1 kg)            Breakfast Lunch Dinner Snacks Fluids   Reviewed           REVIEW OF SYSTEMS  GENERAL HEALTH: feels well otherwise, denied any fevers chills or night sweats   RESPIRATORY: denies shortness of breath   CARDIOVASCULAR: denies chest pain  GI: denies abdominal pain    EXAM  /88   Pulse 83   Resp 16   Ht 5' 5\" (1.651 m)   Wt 179 lb (81.2 kg)   LMP 10/21/2024 (Approximate)   SpO2 100%   BMI 29.79 kg/m²   GENERAL: well developed,  well nourished,in no apparent distress, A/O x3  SKIN: no rashes,no suspicious lesions  HEENT: atraumatic, normocephalic, OP-clear, PERRL  NECK: supple,no adenopathy  LUNGS: clear to auscultation bilaterally   CARDIO: RRR without murmur  GI: good BS's,NT/ND, no masses or HSM  EXTREMITIES: no cyanosis, no clubbing, no edema    Lab Results   Component Value Date    WBC 7.2 06/19/2024    RBC 4.60 06/19/2024    HGB 13.1 06/19/2024    HCT 41.3 06/19/2024    MCV 89.8 06/19/2024    MCH 28.5 06/19/2024    MCHC 31.7 06/19/2024    RDW 13.6 06/19/2024    .0 06/19/2024     Lab Results   Component Value Date    GLU 88 06/19/2024    BUN 8 (L) 06/19/2024    BUNCREA 10.5 02/10/2021    CREATSERUM 0.72 06/19/2024    ANIONGAP 5 06/19/2024    GFRNAA 80 06/22/2022    GFRAA 92 06/22/2022    CA 9.1 06/19/2024    OSMOCALC 278 06/19/2024    ALKPHO 86 06/19/2024    AST 10 (L) 06/19/2024    ALT 16 06/19/2024    BILT 0.4 06/19/2024    TP 6.9 06/19/2024    ALB 3.5 06/19/2024    GLOBULIN 3.4 06/19/2024     (L) 06/19/2024    K 4.4 06/19/2024     06/19/2024    CO2 26.0 06/19/2024     No results found for: \"EAG\", \"A1C\"  Lab Results   Component Value Date    CHOLEST 163 06/19/2024    TRIG 178 (H) 06/19/2024    HDL 65 (H) 06/19/2024    LDL 69 06/19/2024    VLDL 27 06/19/2024    NONHDLC 98 06/19/2024     Lab Results   Component Value Date    TSH 1.710 06/19/2024     Lab Results   Component Value Date    B12 437 02/10/2021     Lab Results   Component Value Date    VITD 29.3 (L) 06/19/2024       Medications Ordered Prior to Encounter[1]    ASSESSMENT  Analyzed weight data:  Weight Calculations  Initial Weight: 186 lbs  Initial Weight Date: 04/21/23  Today's Weight: 179 lbs  5% Goal: 9.3  10% Goal: 18.6  Total Weight Loss: 7 lbs    Diagnoses and all orders for this visit:    Encounter for therapeutic drug level monitoring  -     amphetamine-dextroamphetamine (ADDERALL) 20 MG Oral Tab; Take 1 tablet (20 mg total) by mouth 2 (two) times  daily.  -     amphetamine-dextroamphetamine (ADDERALL) 20 MG Oral Tab; Take 1 tablet (20 mg total) by mouth 2 (two) times daily.  -     amphetamine-dextroamphetamine (ADDERALL) 20 MG Oral Tab; Take 1 tablet (20 mg total) by mouth 2 (two) times daily.  -     metFORMIN  MG Oral Tablet 24 Hr; Take 1 tablet (750 mg total) by mouth daily.    Overweight (BMI 25.0-29.9)  -     amphetamine-dextroamphetamine (ADDERALL) 20 MG Oral Tab; Take 1 tablet (20 mg total) by mouth 2 (two) times daily.  -     amphetamine-dextroamphetamine (ADDERALL) 20 MG Oral Tab; Take 1 tablet (20 mg total) by mouth 2 (two) times daily.  -     amphetamine-dextroamphetamine (ADDERALL) 20 MG Oral Tab; Take 1 tablet (20 mg total) by mouth 2 (two) times daily.  -     metFORMIN  MG Oral Tablet 24 Hr; Take 1 tablet (750 mg total) by mouth daily.    Binge eating disorder, unspecified severity  -     amphetamine-dextroamphetamine (ADDERALL) 20 MG Oral Tab; Take 1 tablet (20 mg total) by mouth 2 (two) times daily.  -     amphetamine-dextroamphetamine (ADDERALL) 20 MG Oral Tab; Take 1 tablet (20 mg total) by mouth 2 (two) times daily.  -     amphetamine-dextroamphetamine (ADDERALL) 20 MG Oral Tab; Take 1 tablet (20 mg total) by mouth 2 (two) times daily.  -     metFORMIN  MG Oral Tablet 24 Hr; Take 1 tablet (750 mg total) by mouth daily.    Mixed hyperlipidemia  -     amphetamine-dextroamphetamine (ADDERALL) 20 MG Oral Tab; Take 1 tablet (20 mg total) by mouth 2 (two) times daily.  -     amphetamine-dextroamphetamine (ADDERALL) 20 MG Oral Tab; Take 1 tablet (20 mg total) by mouth 2 (two) times daily.  -     amphetamine-dextroamphetamine (ADDERALL) 20 MG Oral Tab; Take 1 tablet (20 mg total) by mouth 2 (two) times daily.  -     metFORMIN  MG Oral Tablet 24 Hr; Take 1 tablet (750 mg total) by mouth daily.    Generalized anxiety disorder  -     amphetamine-dextroamphetamine (ADDERALL) 20 MG Oral Tab; Take 1 tablet (20 mg total) by mouth 2  (two) times daily.  -     amphetamine-dextroamphetamine (ADDERALL) 20 MG Oral Tab; Take 1 tablet (20 mg total) by mouth 2 (two) times daily.  -     amphetamine-dextroamphetamine (ADDERALL) 20 MG Oral Tab; Take 1 tablet (20 mg total) by mouth 2 (two) times daily.  -     metFORMIN  MG Oral Tablet 24 Hr; Take 1 tablet (750 mg total) by mouth daily.        PLAN  Initial Weight: 186lbs  Initial Weight Date: 4/21/23  Today's Weight: 179lbs  5% Goal: 9.3lbs  10% Goal: 18.6lbs  Total Weight Loss: -9lbs/Net loss 7lbs    Will continue metformin and adderall - advised side effects and adverse effects of medication   Continue to work on mindful eating, healthy habits  HLD - stable on current medication rosuvastatin, will continue, will continue to work on lifestyle modifications  Mood is stable, stress management, mindfulness, meditation, yoga, coloring, reading, puzzles, etc.  Consistency with logging foods - protein and produce  Incorporate strength/resistance training, 7 min workout nicolette, walk around the block before going in the house when she gets home from work.  Nutrition: low carb diet/ recommended to eat breakfast daily/ regular protein intake  Medication use and side effects reviewed with patient.  Medication contraindications: n/a  Follow up with dietitian and psychologist as recommended.  Discussed the role of sleep and stress in weight management.  Counseled on comprehensive weight loss plan including attention to nutrition, exercise and behavior/stress management for success. See patient instruction below for more details.  Discussed strategies to overcome barriers to successful weight loss and weight maintenance  FITTE: ACSM recommendations (150-300 minutes/ week in active weight loss)   Weight Loss consent to treat reviewed and signed       NOTE TO PATIENT: The 21st Century Cures Act makes clinical notes like these available to patients in the interest of transparency. Clinical notes are medical documents  used by physicians and care providers to communicate with each other. These documents include medical language and terminology, abbreviations, and treatment information that may sound technical and at times possibly unfamiliar. In addition, at times, the verbiage may appear blunt or direct. These documents are one tool providers use to communicate relevant information and clinical opinions of the care providers in a way that allows common understanding of the clinical context.     There are no Patient Instructions on file for this visit.    No follow-ups on file.    Patient verbalizes understanding.    Laura Love PA-C  10/31/2024           [1]   Current Outpatient Medications on File Prior to Visit   Medication Sig Dispense Refill    Amphetamine-Dextroamphet ER 20 MG Oral Capsule SR 24 Hr Take 1 capsule (20 mg total) by mouth in the morning and 1 capsule (20 mg total) before bedtime.      FAMOTIDINE 40 MG Oral Tab TAKE 1 TABLET(40 MG) BY MOUTH DAILY 90 tablet 0    Norgestim-Eth Estrad Triphasic (TRI-SPRINTEC) 0.18/0.215/0.25 MG-35 MCG Oral Tab TAKE 1 TABLET BY MOUTH DAILY 84 tablet 0    Rizatriptan Benzoate 10 MG Oral Tab Take 1 tablet (10 mg total) by mouth as needed (take at onset of headache, may repeat dose in 2 hours). 12 tablet 3    fenofibrate micronized 134 MG Oral Cap Take 1 capsule (134 mg total) by mouth daily. 90 capsule 1    rosuvastatin 5 MG Oral Tab Take 1 tablet (5 mg total) by mouth nightly. 90 tablet 1    ergocalciferol 1.25 MG (83193 UT) Oral Cap Take 1 capsule (50,000 Units total) by mouth once a week. 12 capsule 0    sertraline 50 MG Oral Tab Take 1 tablet (50 mg total) by mouth daily. 90 tablet 1    Calcium Carbonate Antacid (TUMS) 500 MG Oral Chew Tab Chew 2 tablets (1,000 mg total) by mouth 2 (two) times daily.       No current facility-administered medications on file prior to visit.

## 2024-11-27 DIAGNOSIS — Z30.41 SURVEILLANCE FOR BIRTH CONTROL, ORAL CONTRACEPTIVES: ICD-10-CM

## 2024-11-28 RX ORDER — NORGESTIMATE AND ETHINYL ESTRADIOL 7DAYSX3 28
1 KIT ORAL DAILY
Qty: 84 TABLET | Refills: 0 | Status: SHIPPED | OUTPATIENT
Start: 2024-11-28

## 2024-11-28 NOTE — TELEPHONE ENCOUNTER
LOV 7/17/14    Last refill  Norgestim-Eth Estrad Triphasic (TRI-SPRINTEC) 0.18/0.215/0.25 MG-35 MCG Oral Tab 84 tablet 0 9/7/2024 --    Sig - Route: TAKE 1 TABLET BY MOUTH DAILY - Oral      Rx pended for your approval if ok.  Please review and advise. Thank you.

## 2024-11-29 DIAGNOSIS — G43.829 MENSTRUAL MIGRAINE WITHOUT STATUS MIGRAINOSUS, NOT INTRACTABLE: ICD-10-CM

## 2024-12-02 RX ORDER — RIZATRIPTAN BENZOATE 10 MG/1
10 TABLET ORAL AS NEEDED
Qty: 12 TABLET | Refills: 3 | Status: SHIPPED | OUTPATIENT
Start: 2024-12-02

## 2024-12-03 NOTE — TELEPHONE ENCOUNTER
Refill passes per [facility] protocol.    Future Appointments   Date Time Provider Department Center   1/18/2025 11:40 AM Ellie Wray MD [facility]     Last office visit: 7/17/24    Requested Prescriptions   Pending Prescriptions Disp Refills    RIZATRIPTAN BENZOATE 10 MG Oral Tab [Pharmacy Med Name: RIZATRIPTAN 10MG TABLETS] 12 tablet 3     Sig: TAKE 1 TABLET BY MOUTH AS NEEDED(TAKE AT ONSET OF HEADACHE, MAY REPEAT DOSE IN 2 HOURS)       Neurology Medications Passed - 12/2/2024  7:08 PM        Passed - In person appointment or virtual visit in the past 6 mos or appointment in next 3 mos     Recent Outpatient Visits              1 month ago Encounter for therapeutic drug level monitoring    [facility], [address] [place] [address] LoveLaura PA-C    Office Visit    4 months ago Mixed hyperlipidemia    [address] Ellie Wray MD    Office Visit    4 months ago Chronic GERD    [facility],  [facility] Andrew Bianchi DO    Office Visit    5 months ago Encounter for therapeutic drug level monitoring    [facility], [address] [place] [address] Laura Love PA-C    Office Visit    11 months ago Routine general medical examination at a health care facility    [address] Ellie Wray MD    Office Visit          Future Appointments         Provider Department Appt Notes    In 1 month Ellie Wray MD [address] Follow up    In 3 months Laura Love PA-C [address] Follow up for refill                         Future Appointments         Provider Department Appt Notes    In 1 month Ellie Wray MD [address] Follow up    In 3 months Laura Love PA-C [address]  [place] Follow up for refill          Recent Outpatient Visits              1 month ago Encounter for therapeutic drug level monitoring    [facility], [address] [place] [address] Laura Love PA-C    Office Visit    4 months ago Mixed hyperlipidemia    [facility], [address] [place] [address], Ellie Polanco MD    Office Visit    4 months ago Chronic GERD    [facility],  [facility] Andrew Bianchi DO    Office Visit    5 months ago Encounter for therapeutic drug level monitoring    [facility], [address] [place] [address] Laura Love PA-C    Office Visit    11 months ago Routine general medical examination at a health care facility    [facility], [address] [place] [address], Ellie Polanco MD    Office Visit

## 2025-01-18 ENCOUNTER — OFFICE VISIT (OUTPATIENT)
Dept: FAMILY MEDICINE CLINIC | Facility: CLINIC | Age: 45
End: 2025-01-18
Payer: COMMERCIAL

## 2025-01-18 VITALS
HEART RATE: 88 BPM | DIASTOLIC BLOOD PRESSURE: 88 MMHG | BODY MASS INDEX: 29.68 KG/M2 | RESPIRATION RATE: 16 BRPM | SYSTOLIC BLOOD PRESSURE: 124 MMHG | OXYGEN SATURATION: 99 % | WEIGHT: 178.13 LBS | HEIGHT: 65 IN | TEMPERATURE: 98 F

## 2025-01-18 DIAGNOSIS — F41.1 GENERALIZED ANXIETY DISORDER: ICD-10-CM

## 2025-01-18 DIAGNOSIS — Z12.11 SCREENING FOR COLON CANCER: ICD-10-CM

## 2025-01-18 DIAGNOSIS — E78.2 MIXED HYPERLIPIDEMIA: Primary | ICD-10-CM

## 2025-01-18 DIAGNOSIS — Z00.00 LABORATORY EXAMINATION ORDERED AS PART OF A ROUTINE GENERAL MEDICAL EXAMINATION: ICD-10-CM

## 2025-01-18 DIAGNOSIS — G43.829 MENSTRUAL MIGRAINE WITHOUT STATUS MIGRAINOSUS, NOT INTRACTABLE: ICD-10-CM

## 2025-01-18 DIAGNOSIS — Z23 NEED FOR VACCINATION: ICD-10-CM

## 2025-01-18 DIAGNOSIS — Z13.1 SCREENING FOR DIABETES MELLITUS: ICD-10-CM

## 2025-01-18 DIAGNOSIS — Z30.41 SURVEILLANCE FOR BIRTH CONTROL, ORAL CONTRACEPTIVES: ICD-10-CM

## 2025-01-18 PROCEDURE — 3079F DIAST BP 80-89 MM HG: CPT | Performed by: FAMILY MEDICINE

## 2025-01-18 PROCEDURE — 90471 IMMUNIZATION ADMIN: CPT | Performed by: FAMILY MEDICINE

## 2025-01-18 PROCEDURE — 99214 OFFICE O/P EST MOD 30 MIN: CPT | Performed by: FAMILY MEDICINE

## 2025-01-18 PROCEDURE — 3008F BODY MASS INDEX DOCD: CPT | Performed by: FAMILY MEDICINE

## 2025-01-18 PROCEDURE — 90715 TDAP VACCINE 7 YRS/> IM: CPT | Performed by: FAMILY MEDICINE

## 2025-01-18 PROCEDURE — 3074F SYST BP LT 130 MM HG: CPT | Performed by: FAMILY MEDICINE

## 2025-01-18 RX ORDER — ROSUVASTATIN CALCIUM 5 MG/1
5 TABLET, COATED ORAL NIGHTLY
Qty: 90 TABLET | Refills: 1 | Status: SHIPPED | OUTPATIENT
Start: 2025-01-18

## 2025-01-18 RX ORDER — RIZATRIPTAN BENZOATE 10 MG/1
10 TABLET ORAL AS NEEDED
Qty: 12 TABLET | Refills: 3 | Status: SHIPPED | OUTPATIENT
Start: 2025-01-18

## 2025-01-18 RX ORDER — FENOFIBRATE 134 MG/1
134 CAPSULE ORAL DAILY
Qty: 90 CAPSULE | Refills: 1 | Status: SHIPPED | OUTPATIENT
Start: 2025-01-18

## 2025-01-18 RX ORDER — NORGESTIMATE AND ETHINYL ESTRADIOL 7DAYSX3 28
1 KIT ORAL DAILY
Qty: 84 TABLET | Refills: 3 | Status: SHIPPED | OUTPATIENT
Start: 2025-01-18

## 2025-01-18 RX ORDER — NORGESTIMATE AND ETHINYL ESTRADIOL 7DAYSX3 28
1 KIT ORAL DAILY
Qty: 84 TABLET | Refills: 0 | Status: CANCELLED | OUTPATIENT
Start: 2025-01-18

## 2025-01-18 NOTE — PROGRESS NOTES
Family Medicine Progress Note  ASSESSMENT AND PLAN:  Renata Bravo is a 44 year old female who is here for:   Renata was seen today for anxiety and medication follow-up.    Diagnoses and all orders for this visit:    Mixed hyperlipidemia controlled  -     fenofibrate micronized 134 MG Oral Cap; Take 1 capsule (134 mg total) by mouth daily.  -     rosuvastatin 5 MG Oral Tab; Take 1 tablet (5 mg total) by mouth nightly.  -     continue the same dose of medication    Menstrual migraine without status migrainosus, not intractable  -     Rizatriptan Benzoate 10 MG Oral Tab; Take 1 tablet (10 mg total) by mouth as needed (take at onset of headache, may repeat dose in 2 hours).  -    continue medication as needed    Generalized anxiety disorder controlled  -     sertraline 50 MG Oral Tab; Take 1 tablet (50 mg total) by mouth daily.  -     continue the same dose of medication    Surveillance for birth control, oral contraceptives  -     Norgestim-Eth Estrad Triphasic (TRI-SPRINTEC) 0.18/0.215/0.25 MG-35 MCG Oral Tab; Take 1 tablet by mouth daily.    Laboratory examination ordered as part of a routine general medical examination  -     CBC With Differential With Platelet; Future  -     Assay, Thyroid Stim Hormone; Future  -     Lipid Panel; Future  -     Comp Metabolic Panel (14); Future    Screening for diabetes mellitus  -     Hemoglobin A1C [E]; Future    Screening for colon cancer  -     Gastro Referral - In Network    Need for vaccination  -     TETANUS, DIPHTHERIA TOXOIDS AND ACELLULAR PERTUSIS VACCINE (TDAP), >7 YEARS, IM USE       The patient indicates understanding of these issues and agrees to the plan.  Follow-Up: The patient is asked to return in Return in about 6 months (around 7/18/2025) for anxiety, hyperlipidemia.  .     Ellie Wray MD   01/18/25      CC: Anxiety and Medication Follow-Up    HPI:   Renata Bravo is a 44 year old female who presents for Anxiety and Medication Follow-Up     Mood is  well controlled on the current dose of medication.    TERRANCE-7 Scale       Feeling nervous, anxious, or on edge: Not at all  Not being able to stop or control worrying: Not at all  Worrying too much about different things   : Not at all  Trouble relaxing: Not at all  Being so restless that it's hard to sit still: Not at all  Becoming easily annoyed or irritable: Several days  Feeling afraid as if something awful might happen: Not at all    TERRANCE 7 Total Score: 1  If you checked off any problems, how difficult have these made it for you to do your work, take care of things at home, or get along with other people?: Not difficult at all         PHQ9 Scale     1. Little interest or pleasure in doing things: Not at all  2. Feeling down, depressed, or hopeless: Not at all  3. Trouble falling or staying asleep, or sleeping too much: Not at all  4. Feeling tired or having little energy: Not at all  5. Poor appetite or overeating: Several days  6. Feeling bad about yourself - or that you are a failure or have let yourself or your family down: Not at all  7. Trouble concentrating on things, such as reading the newspaper or watching television: Not at all  8. Moving or speaking so slowly that other people could have noticed. Or the opposite - being so fidgety or restless that you have been moving around a lot more than usual: Not at all  9. Thoughts that you would be better off dead, or of hurting yourself in some way: Not at all  PHQ-9 TOTAL SCORE: 1  If you checked off any problems, how difficult have these problems made it for you to do your work, take care of things at home, or get along with other people?: Not difficult at all        Compliant with her cholesterol medication, compliant with diet and partially compliant with exercise.    Migraines are stable, does get up headache with the weather change and before her menstrual cycle..    ALLERGY:     Allergies as of 01/18/2025    (No Known Allergies)     MEDICATIONS:      Current Outpatient Medications   Medication Sig Dispense Refill    Rizatriptan Benzoate 10 MG Oral Tab Take 1 tablet (10 mg total) by mouth as needed (take at onset of headache, may repeat dose in 2 hours). 12 tablet 3    TRI-SPRINTEC 0.18/0.215/0.25 MG-35 MCG Oral Tab TAKE 1 TABLET BY MOUTH DAILY 84 tablet 0    Amphetamine-Dextroamphet ER 20 MG Oral Capsule SR 24 Hr Take 1 capsule (20 mg total) by mouth in the morning and 1 capsule (20 mg total) before bedtime.      amphetamine-dextroamphetamine (ADDERALL) 20 MG Oral Tab Take 1 tablet (20 mg total) by mouth 2 (two) times daily. 60 tablet 0    metFORMIN  MG Oral Tablet 24 Hr Take 1 tablet (750 mg total) by mouth daily. 90 tablet 0    fenofibrate micronized 134 MG Oral Cap Take 1 capsule (134 mg total) by mouth daily. 90 capsule 1    rosuvastatin 5 MG Oral Tab Take 1 tablet (5 mg total) by mouth nightly. 90 tablet 1    sertraline 50 MG Oral Tab Take 1 tablet (50 mg total) by mouth daily. 90 tablet 1    Calcium Carbonate Antacid (TUMS) 500 MG Oral Chew Tab Chew 2 tablets (1,000 mg total) by mouth 2 (two) times daily.      FAMOTIDINE 40 MG Oral Tab TAKE 1 TABLET(40 MG) BY MOUTH DAILY (Patient not taking: Reported on 1/18/2025) 90 tablet 0      Past Medical History:    Allergic rhinitis    Anxiety    Bloating    Depression    Depressive disorder, not elsewhere classified    Diarrhea, unspecified    Dysmenorrhea    Esophageal reflux    Fatigue    Food intolerance    Frequent use of laxatives    Generalized anxiety disorder    Headache disorder    Heartburn    Heavy menses    Hemorrhoids    History of depression    Indigestion    Iron deficiency anemia secondary to inadequate dietary iron intake    Mixed hyperlipidemia    Night sweats    Ovarian cyst    Panic disorder without agoraphobia    Pap smear for cervical cancer screening    wnl    Weight gain      Social History:  Social History     Socioeconomic History    Marital status:    Tobacco Use     Smoking status: Never     Passive exposure: Never    Smokeless tobacco: Never   Vaping Use    Vaping status: Never Used   Substance and Sexual Activity    Alcohol use: Yes     Alcohol/week: 3.0 standard drinks of alcohol     Types: 3 Cans of beer per week     Comment: OCC, Socially 2-3 beers    Drug use: Never    Sexual activity: Yes     Partners: Male     Birth control/protection: OCP   Other Topics Concern    Caffeine Concern Yes    Stress Concern No    Weight Concern Yes    Special Diet No    Exercise Yes    Seat Belt Yes     Social Drivers of Health     Food Insecurity: No Food Insecurity (1/18/2025)    NCSS - Food Insecurity     Worried About Running Out of Food in the Last Year: No     Ran Out of Food in the Last Year: No   Transportation Needs: No Transportation Needs (1/18/2025)    NCSS - Transportation     Lack of Transportation: No   Housing Stability: Not At Risk (1/18/2025)    NCSS - Housing/Utilities     Has Housing: Yes     Worried About Losing Housing: No     Unable to Get Utilities: No        REVIEW OF SYSTEMS:   GENERAL HEALTH: feels well otherwise  SKIN: denies any unusual skin lesions or rashes  RESPIRATORY: denies shortness of breath with exertion  CARDIOVASCULAR: denies chest pain on exertion  GI: denies abdominal pain and denies heartburn  NEURO: denies headaches    EXAM:   /88   Pulse 88   Temp 97.6 °F (36.4 °C) (Temporal)   Resp 16   Ht 5' 5\" (1.651 m)   Wt 178 lb 2 oz (80.8 kg)   LMP 01/09/2025 (Approximate)   SpO2 99%   BMI 29.64 kg/m²   GENERAL: obese,in no apparent distress  SKIN: no rashes,no suspicious lesions  HEENT: atraumatic, normocephalic,ears and throat are clear  NECK: supple,no adenopathy,no bruits  LUNGS: clear to auscultation  CARDIO: RRR without murmur  GI: good BS's,no masses, HSM or tenderness  EXTREMITIES: no cyanosis, clubbing or edema      NOTE TO PATIENT: The 21st Century Cures Act makes clinical notes like these available to patients in the interest of  transparency. Clinical notes are medical documents used by physicians and care providers to communicate with each other. These documents include medical language and terminology, abbreviations, and treatment information that may sound technical and at times possibly unfamiliar. In addition, at times, the verbiage may appear blunt or direct. These documents are one tool providers use to communicate relevant information and clinical opinions of the care providers in a way that allows common understanding of the clinical context.      Ellie Wray MD

## 2025-02-21 RX ORDER — DEXTROAMPHETAMINE SACCHARATE, AMPHETAMINE ASPARTATE MONOHYDRATE, DEXTROAMPHETAMINE SULFATE AND AMPHETAMINE SULFATE 5; 5; 5; 5 MG/1; MG/1; MG/1; MG/1
20 CAPSULE, EXTENDED RELEASE ORAL 2 TIMES DAILY
Refills: 0 | OUTPATIENT
Start: 2025-02-21

## 2025-03-06 DIAGNOSIS — F41.1 GENERALIZED ANXIETY DISORDER: ICD-10-CM

## 2025-03-06 DIAGNOSIS — E66.3 OVERWEIGHT (BMI 25.0-29.9): ICD-10-CM

## 2025-03-06 DIAGNOSIS — E78.2 MIXED HYPERLIPIDEMIA: ICD-10-CM

## 2025-03-06 DIAGNOSIS — Z51.81 ENCOUNTER FOR THERAPEUTIC DRUG LEVEL MONITORING: ICD-10-CM

## 2025-03-06 DIAGNOSIS — F50.819 BINGE EATING DISORDER, UNSPECIFIED SEVERITY: ICD-10-CM

## 2025-03-06 NOTE — TELEPHONE ENCOUNTER
Requested Prescriptions     Pending Prescriptions Disp Refills    metFORMIN  MG Oral Tablet 24 Hr 90 tablet 0     Sig: Take 1 tablet (750 mg total) by mouth daily.      LOV: 10/31/24  RTC: 3-6mo  Last Relevant Labs:   Filled: 10/31/24 #90 with 0 refills    Future Appointments   Date Time Provider Department Center   3/19/2025 12:20 PM Laura Love PA-C [id number] [address]   7/18/2025  2:00 PM Ellie Wray MD [facility]

## 2025-03-11 RX ORDER — METFORMIN HYDROCHLORIDE 750 MG/1
750 TABLET, EXTENDED RELEASE ORAL DAILY
Qty: 90 TABLET | Refills: 0 | Status: SHIPPED | OUTPATIENT
Start: 2025-03-11

## 2025-03-15 DIAGNOSIS — E78.2 MIXED HYPERLIPIDEMIA: ICD-10-CM

## 2025-03-18 NOTE — TELEPHONE ENCOUNTER
Fenofibrate Micronized 134 mg : 6 month supply was sent to [facility] in  [place] on 01/18/2025.    [facility]  in [place] is requesting.      Outpatient Medication Detail     Disp Refills Start End    fenofibrate micronized 134 MG Oral Cap 90 capsule 1 1/18/2025 --    Sig - Route: Take 1 capsule (134 mg total) by mouth daily. - Oral    Sent to pharmacy as: Fenofibrate Micronized 134 MG Oral Capsule (Lofibra)    Notes to Pharmacy: Please disregard previous prescription    E-Prescribing Status: Receipt confirmed by pharmacy (1/18/2025 12:08 PM CST)      Associated Diagnoses    Mixed hyperlipidemia  - Primary        Pharmacy    [id number] - [address], [phone], [phone]     Fenofibrate Micronized 134 mg - 6 month supply has been forwarded to [facility] [place] [facility] per patient request.

## 2025-03-19 ENCOUNTER — OFFICE VISIT (OUTPATIENT)
Facility: CLINIC | Age: 45
End: 2025-03-19
Payer: COMMERCIAL

## 2025-03-19 VITALS
WEIGHT: 181 LBS | DIASTOLIC BLOOD PRESSURE: 70 MMHG | OXYGEN SATURATION: 100 % | RESPIRATION RATE: 18 BRPM | BODY MASS INDEX: 30.16 KG/M2 | HEIGHT: 65 IN | HEART RATE: 87 BPM | SYSTOLIC BLOOD PRESSURE: 118 MMHG

## 2025-03-19 DIAGNOSIS — F50.819 BINGE EATING DISORDER, UNSPECIFIED SEVERITY: ICD-10-CM

## 2025-03-19 DIAGNOSIS — E66.811 CLASS 1 OBESITY WITH SERIOUS COMORBIDITY AND BODY MASS INDEX (BMI) OF 30.0 TO 30.9 IN ADULT, UNSPECIFIED OBESITY TYPE: ICD-10-CM

## 2025-03-19 DIAGNOSIS — E78.2 MIXED HYPERLIPIDEMIA: ICD-10-CM

## 2025-03-19 DIAGNOSIS — Z51.81 ENCOUNTER FOR THERAPEUTIC DRUG LEVEL MONITORING: Primary | ICD-10-CM

## 2025-03-19 DIAGNOSIS — F41.1 GENERALIZED ANXIETY DISORDER: ICD-10-CM

## 2025-03-19 PROCEDURE — 3074F SYST BP LT 130 MM HG: CPT | Performed by: PHYSICIAN ASSISTANT

## 2025-03-19 PROCEDURE — 3008F BODY MASS INDEX DOCD: CPT | Performed by: PHYSICIAN ASSISTANT

## 2025-03-19 PROCEDURE — 3078F DIAST BP <80 MM HG: CPT | Performed by: PHYSICIAN ASSISTANT

## 2025-03-19 PROCEDURE — 99214 OFFICE O/P EST MOD 30 MIN: CPT | Performed by: PHYSICIAN ASSISTANT

## 2025-03-19 RX ORDER — LISDEXAMFETAMINE DIMESYLATE 50 MG/1
50 CAPSULE ORAL DAILY
Qty: 30 CAPSULE | Refills: 0 | Status: SHIPPED | OUTPATIENT
Start: 2025-05-20 | End: 2025-06-19

## 2025-03-19 RX ORDER — LISDEXAMFETAMINE DIMESYLATE 50 MG/1
50 CAPSULE ORAL DAILY
Qty: 30 CAPSULE | Refills: 0 | Status: SHIPPED | OUTPATIENT
Start: 2025-03-19 | End: 2025-04-18

## 2025-03-19 RX ORDER — LISDEXAMFETAMINE DIMESYLATE 50 MG/1
50 CAPSULE ORAL DAILY
Qty: 30 CAPSULE | Refills: 0 | Status: SHIPPED | OUTPATIENT
Start: 2025-04-19 | End: 2025-05-19

## 2025-03-19 RX ORDER — FENOFIBRATE 134 MG/1
134 CAPSULE ORAL DAILY
Qty: 90 CAPSULE | Refills: 1 | Status: SHIPPED | OUTPATIENT
Start: 2025-03-19

## 2025-03-19 RX ORDER — METFORMIN HYDROCHLORIDE 750 MG/1
1500 TABLET, EXTENDED RELEASE ORAL DAILY
Qty: 180 TABLET | Refills: 0 | Status: SHIPPED | OUTPATIENT
Start: 2025-03-19

## 2025-03-19 NOTE — PROGRESS NOTES
HISTORY OF PRESENT ILLNESS  Chief Complaint   Patient presents with    Weight Check     +2lb       Renata Bravo is a 44 year old female here for follow up in medical weight loss program.   +2lb  Compliant on metformin and adderall  Denies chest pain, shortness of breath, dizziness, blurred vision, headache, paresthesia, nausea/vomiting.   Feels like at a plateau  This past week things have fallen off more, not making the right choices  Doing pretty well with protein     Exercise/Activity: a little better, getting more walks in, but still not getting enough routine  Nutrition: 24 hour food log reviewed, eating regular meals, +protein  Stress: 9/10 - job is stressful, does feel like she is doing an ok job managing it  Sleep: 6 hours/night     [facility] Follow Up    General Information  Success Moment: Not binging at night!  Challenging Moment: Portion control  Nutrition Recall  Breakfast: Instant oatmeal Lunch: Pb&j, cookies   Dinner: Tacos Snacks: Cheetos   Fluids: Diet Coke,tea Dining Out: 4   Exercise   Patient stated exercises # days/week: 2  Patient stated perceived level of   exertion: 2 Anaerobic Days: 0   Aerobic Days: 2   Patient stated average level of stress: 9  Sleep   Patient stated # hours uninterrupted sleep: 6   Patient stated feels   restful: Yes      Goals: Refill              Wt Readings from Last 6 Encounters:   03/19/25 181 lb (82.1 kg)   01/18/25 178 lb 2 oz (80.8 kg)   10/31/24 179 lb (81.2 kg)   07/17/24 186 lb (84.4 kg)   07/16/24 185 lb 6.4 oz (84.1 kg)   06/28/24 188 lb (85.3 kg)            Breakfast Lunch Dinner Snacks Fluids   Reviewed           REVIEW OF SYSTEMS  GENERAL HEALTH: feels well otherwise, denied any fevers chills or night sweats   RESPIRATORY: denies shortness of breath   CARDIOVASCULAR: denies chest pain  GI: denies abdominal pain    EXAM  /70   Pulse 87   Resp 18   Ht 5' 5\" (1.651 m)   Wt 181 lb (82.1 kg)   LMP 01/09/2025 (Approximate)   SpO2 100%   BMI 30.12 kg/m²    GENERAL: well developed, well nourished,in no apparent distress, A/O x3  SKIN: no rashes,no suspicious lesions  HEENT: atraumatic, normocephalic, OP-clear, PERRL  NECK: supple,no adenopathy  LUNGS: clear to auscultation bilaterally   CARDIO: RRR without murmur  GI: good BS's,NT/ND, no masses or HSM  EXTREMITIES: no cyanosis, no clubbing, no edema    Lab Results   Component Value Date    WBC 7.2 06/19/2024    RBC 4.60 06/19/2024    HGB 13.1 06/19/2024    HCT 41.3 06/19/2024    MCV 89.8 06/19/2024    MCH 28.5 06/19/2024    MCHC 31.7 06/19/2024    RDW 13.6 06/19/2024    .0 06/19/2024     Lab Results   Component Value Date    GLU 88 06/19/2024    BUN 8 (L) 06/19/2024    BUNCREA 10.5 02/10/2021    CREATSERUM 0.72 06/19/2024    ANIONGAP 5 06/19/2024    GFRNAA 80 06/22/2022    GFRAA 92 06/22/2022    CA 9.1 06/19/2024    OSMOCALC 278 06/19/2024    ALKPHO 86 06/19/2024    AST 10 (L) 06/19/2024    ALT 16 06/19/2024    BILT 0.4 06/19/2024    TP 6.9 06/19/2024    ALB 3.5 06/19/2024    GLOBULIN 3.4 06/19/2024     (L) 06/19/2024    K 4.4 06/19/2024     06/19/2024    CO2 26.0 06/19/2024     No results found for: \"EAG\", \"A1C\"  Lab Results   Component Value Date    CHOLEST 163 06/19/2024    TRIG 178 (H) 06/19/2024    HDL 65 (H) 06/19/2024    LDL 69 06/19/2024    VLDL 27 06/19/2024    NONHDLC 98 06/19/2024     Lab Results   Component Value Date    TSH 1.710 06/19/2024     Lab Results   Component Value Date    B12 437 02/10/2021     Lab Results   Component Value Date    VITD 29.3 (L) 06/19/2024       Medications Ordered Prior to Encounter[1]    ASSESSMENT  Analyzed weight data:       Diagnoses and all orders for this visit:    Encounter for therapeutic drug level monitoring  -     lisdexamfetamine (VYVANSE) 50 MG Oral Cap; Take 1 capsule (50 mg total) by mouth daily.  -     lisdexamfetamine (VYVANSE) 50 MG Oral Cap; Take 1 capsule (50 mg total) by mouth daily.  -     lisdexamfetamine (VYVANSE) 50 MG Oral Cap; Take  1 capsule (50 mg total) by mouth daily.  -     metFORMIN  MG Oral Tablet 24 Hr; Take 2 tablets (1,500 mg total) by mouth daily.    Class 1 obesity with serious comorbidity and body mass index (BMI) of 30.0 to 30.9 in adult, unspecified obesity type  -     lisdexamfetamine (VYVANSE) 50 MG Oral Cap; Take 1 capsule (50 mg total) by mouth daily.  -     lisdexamfetamine (VYVANSE) 50 MG Oral Cap; Take 1 capsule (50 mg total) by mouth daily.  -     lisdexamfetamine (VYVANSE) 50 MG Oral Cap; Take 1 capsule (50 mg total) by mouth daily.  -     metFORMIN  MG Oral Tablet 24 Hr; Take 2 tablets (1,500 mg total) by mouth daily.    Binge eating disorder, unspecified severity  -     lisdexamfetamine (VYVANSE) 50 MG Oral Cap; Take 1 capsule (50 mg total) by mouth daily.  -     lisdexamfetamine (VYVANSE) 50 MG Oral Cap; Take 1 capsule (50 mg total) by mouth daily.  -     lisdexamfetamine (VYVANSE) 50 MG Oral Cap; Take 1 capsule (50 mg total) by mouth daily.  -     metFORMIN  MG Oral Tablet 24 Hr; Take 2 tablets (1,500 mg total) by mouth daily.    Mixed hyperlipidemia  -     lisdexamfetamine (VYVANSE) 50 MG Oral Cap; Take 1 capsule (50 mg total) by mouth daily.  -     lisdexamfetamine (VYVANSE) 50 MG Oral Cap; Take 1 capsule (50 mg total) by mouth daily.  -     lisdexamfetamine (VYVANSE) 50 MG Oral Cap; Take 1 capsule (50 mg total) by mouth daily.  -     metFORMIN  MG Oral Tablet 24 Hr; Take 2 tablets (1,500 mg total) by mouth daily.    Generalized anxiety disorder  -     lisdexamfetamine (VYVANSE) 50 MG Oral Cap; Take 1 capsule (50 mg total) by mouth daily.  -     lisdexamfetamine (VYVANSE) 50 MG Oral Cap; Take 1 capsule (50 mg total) by mouth daily.  -     lisdexamfetamine (VYVANSE) 50 MG Oral Cap; Take 1 capsule (50 mg total) by mouth daily.  -     metFORMIN  MG Oral Tablet 24 Hr; Take 2 tablets (1,500 mg total) by mouth daily.        PLAN  Initial Weight: 186 lbs  Initial Weight Date:  04/21/23  Today's Weight: 181 lbs  5% Goal: 9.3  10% Goal: 18.6  Total Weight Loss: 7 lbs    Discontinue Adderall  Will begin vyvanse - discussed side effects including but not limited to:( elevated BP, tremor, anxiety, headache, constipation and serious side effects including arrhythmia and cad ) patient verbalized understanding agrees with the plan  Will continue metformin - advised side effects and adverse effects of medication   HLD - stable on current medication rosuvastatin and fenofibrate, will continue, will continue to work on lifestyle modifications  Mood is stable on current medication, continue to work on stress management  Continue to work on mindful eating  Focus on protein and produce  Discussed ways to incorporate more strength/resistance training  Nutrition: low carb diet/ recommended to eat breakfast daily/ regular protein intake  Medication use and side effects reviewed with patient.  Medication contraindications: n/a  Follow up with dietitian and psychologist as recommended.  Discussed the role of sleep and stress in weight management.  Counseled on comprehensive weight loss plan including attention to nutrition, exercise and behavior/stress management for success. See patient instruction below for more details.  Discussed strategies to overcome barriers to successful weight loss and weight maintenance  FITTE: ACSM recommendations (150-300 minutes/ week in active weight loss)   Weight Loss consent to treat reviewed and signed       NOTE TO PATIENT: The 21st Century Cures Act makes clinical notes like these available to patients in the interest of transparency. Clinical notes are medical documents used by physicians and care providers to communicate with each other. These documents include medical language and terminology, abbreviations, and treatment information that may sound technical and at times possibly unfamiliar. In addition, at times, the verbiage may appear blunt or direct. These  documents are one tool providers use to communicate relevant information and clinical opinions of the care providers in a way that allows common understanding of the clinical context.     There are no Patient Instructions on file for this visit.    No follow-ups on file.    Patient verbalizes understanding.    Laura Love PA-C  3/19/2025           [1]   Current Outpatient Medications on File Prior to Visit   Medication Sig Dispense Refill    FENOFIBRATE MICRONIZED 134 MG Oral Cap TAKE 1 CAPSULE BY MOUTH DAILY 90 capsule 1    metFORMIN  MG Oral Tablet 24 Hr Take 1 tablet (750 mg total) by mouth daily. 90 tablet 0    Rizatriptan Benzoate 10 MG Oral Tab Take 1 tablet (10 mg total) by mouth as needed (take at onset of headache, may repeat dose in 2 hours). 12 tablet 3    rosuvastatin 5 MG Oral Tab Take 1 tablet (5 mg total) by mouth nightly. 90 tablet 1    sertraline 50 MG Oral Tab Take 1 tablet (50 mg total) by mouth daily. 90 tablet 1    Norgestim-Eth Estrad Triphasic (TRI-SPRINTEC) 0.18/0.215/0.25 MG-35 MCG Oral Tab Take 1 tablet by mouth daily. 84 tablet 3    Amphetamine-Dextroamphet ER 20 MG Oral Capsule SR 24 Hr Take 1 capsule (20 mg total) by mouth in the morning and 1 capsule (20 mg total) before bedtime.      Calcium Carbonate Antacid (TUMS) 500 MG Oral Chew Tab Chew 2 tablets (1,000 mg total) by mouth 2 (two) times daily.      FAMOTIDINE 40 MG Oral Tab TAKE 1 TABLET(40 MG) BY MOUTH DAILY (Patient not taking: Reported on 3/19/2025) 90 tablet 0     No current facility-administered medications on file prior to visit.

## 2025-03-19 NOTE — TELEPHONE ENCOUNTER
Patient has read [employer] message .        Refill Request  Message [phone]  From  Deepali Damian CPhT To  Renata Bravo Sent and Delivered  3/19/2025  9:16 AM   Last Read in [employer]  3/19/2025  9:56 AM by Renata Bravo

## 2025-03-21 ENCOUNTER — PATIENT MESSAGE (OUTPATIENT)
Facility: CLINIC | Age: 45
End: 2025-03-21

## 2025-03-21 DIAGNOSIS — E66.3 OVERWEIGHT (BMI 25.0-29.9): ICD-10-CM

## 2025-03-21 DIAGNOSIS — F50.819 BINGE EATING DISORDER, UNSPECIFIED SEVERITY: ICD-10-CM

## 2025-03-21 DIAGNOSIS — Z51.81 ENCOUNTER FOR THERAPEUTIC DRUG LEVEL MONITORING: ICD-10-CM

## 2025-03-21 DIAGNOSIS — F41.1 GENERALIZED ANXIETY DISORDER: ICD-10-CM

## 2025-03-26 RX ORDER — DEXTROAMPHETAMINE SACCHARATE, AMPHETAMINE ASPARTATE, DEXTROAMPHETAMINE SULFATE AND AMPHETAMINE SULFATE 5; 5; 5; 5 MG/1; MG/1; MG/1; MG/1
20 TABLET ORAL 2 TIMES DAILY
Qty: 60 TABLET | Refills: 0 | Status: SHIPPED | OUTPATIENT
Start: 2025-03-26

## 2025-03-26 NOTE — TELEPHONE ENCOUNTER
Requesting change back to Adderall 20 mg bid - she cannot get vyvanse  LOV: 3/19/25  RTC: not noted  Last Relevant Labs: na  Filled: 2/20/25 #60 with 0 refills last filled 2/20/25 #60 for 30 days  [place]    Future Appointments   Date Time Provider Department Center   7/18/2025  2:00 PM Ellie Wray MD [facility]   7/23/2025 12:40 PM Laura Love, [id number] [address]

## 2025-03-29 DIAGNOSIS — E78.2 MIXED HYPERLIPIDEMIA: ICD-10-CM

## 2025-04-01 RX ORDER — ROSUVASTATIN CALCIUM 5 MG/1
5 TABLET, COATED ORAL NIGHTLY
Qty: 90 TABLET | Refills: 3 | Status: SHIPPED | OUTPATIENT
Start: 2025-04-01

## 2025-04-01 NOTE — TELEPHONE ENCOUNTER
Refill passed per [facility] protocol.    Requested Prescriptions   Pending Prescriptions Disp Refills    ROSUVASTATIN 5 MG Oral Tab [Pharmacy Med Name: ROSUVASTATIN 5MG TABLETS] 90 tablet 1     Sig: TAKE 1 TABLET BY MOUTH NIGHTLY       Cholesterol Medication Protocol Passed - 4/1/2025  4:04 PM        Passed - ALT < 80     Lab Results   Component Value Date    ALT 16 06/19/2024             Passed - ALT resulted within past year        Passed - Lipid panel within past 12 months     Lab Results   Component Value Date    CHOLEST 163 06/19/2024    TRIG 178 (H) 06/19/2024    HDL 65 (H) 06/19/2024    LDL 69 06/19/2024    VLDL 27 06/19/2024    NONHDLC 98 06/19/2024             Passed - In person appointment or virtual visit in the past 12 mos or appointment in next 3 mos     Recent Outpatient Visits              1 week ago Encounter for therapeutic drug level monitoring    [facility], [address] [place] [address] Laura Love PA-C    Office Visit    2 months ago Mixed hyperlipidemia    [facility], [address] [place] [address] Ellie Wray MD    Office Visit    5 months ago Encounter for therapeutic drug level monitoring    [facility], [address] [place] [address] Laura Love PA-C    Office Visit    8 months ago Mixed hyperlipidemia    [address] Ellie Wray MD    Office Visit    8 months ago Chronic GERD    [facility],  [facility] Andrew Bianchi DO    Office Visit          Future Appointments         Provider Department Appt Notes    In 3 months Ellie Wray MD [address] Follow Up 6mo/ Physical /Med Read    In 3 months Laura Love PA-C [facility], [address] [place] [address] follow up                    Passed - Medication is active on med list             Recent Outpatient Visits              1 week ago Encounter  for therapeutic drug level monitoring    [facility], [address] [place] [address] Laura Love PA-C    Office Visit    2 months ago Mixed hyperlipidemia    [facility], [address] [place] [address] Ellie Wray MD    Office Visit    5 months ago Encounter for therapeutic drug level monitoring    [facility], [address] [place] [address] Laura Love PA-C    Office Visit    8 months ago Mixed hyperlipidemia    [facility], [address] [place] [address] Ellie Wray MD    Office Visit    8 months ago Chronic GERD    [facility],  [facility] Andrew Bianchi DO    Office Visit            Future Appointments         Provider Department Appt Notes    In 3 months Ellie Wray MD [facility], [address] [place] [address] Follow Up 6mo/ Physical /Med Read    In 3 months Laura Love PA-C [facility], [address] [place] [address] follow up

## 2025-04-25 ENCOUNTER — LAB ENCOUNTER (OUTPATIENT)
Dept: LAB | Age: 45
End: 2025-04-25
Attending: FAMILY MEDICINE
Payer: COMMERCIAL

## 2025-04-28 DIAGNOSIS — G43.829 MENSTRUAL MIGRAINE WITHOUT STATUS MIGRAINOSUS, NOT INTRACTABLE: ICD-10-CM

## 2025-04-30 RX ORDER — RIZATRIPTAN BENZOATE 10 MG/1
10 TABLET ORAL AS NEEDED
Qty: 18 TABLET | Refills: 1 | Status: SHIPPED | OUTPATIENT
Start: 2025-04-30

## 2025-04-30 NOTE — TELEPHONE ENCOUNTER
Refill passes per [facility] protocol.    Future Appointments   Date Time Provider Department Center   7/18/2025  2:00 PM Ellie Wray MD [facility]

## 2025-05-02 DIAGNOSIS — F41.1 GENERALIZED ANXIETY DISORDER: ICD-10-CM

## 2025-05-09 ENCOUNTER — PATIENT MESSAGE (OUTPATIENT)
Facility: CLINIC | Age: 45
End: 2025-05-09

## 2025-05-09 DIAGNOSIS — F41.1 GENERALIZED ANXIETY DISORDER: ICD-10-CM

## 2025-05-09 DIAGNOSIS — E66.3 OVERWEIGHT (BMI 25.0-29.9): ICD-10-CM

## 2025-05-09 DIAGNOSIS — F50.819 BINGE EATING DISORDER, UNSPECIFIED SEVERITY: ICD-10-CM

## 2025-05-09 DIAGNOSIS — Z51.81 ENCOUNTER FOR THERAPEUTIC DRUG LEVEL MONITORING: ICD-10-CM

## 2025-05-09 RX ORDER — DEXTROAMPHETAMINE SACCHARATE, AMPHETAMINE ASPARTATE, DEXTROAMPHETAMINE SULFATE AND AMPHETAMINE SULFATE 5; 5; 5; 5 MG/1; MG/1; MG/1; MG/1
20 TABLET ORAL 2 TIMES DAILY
Qty: 60 TABLET | Refills: 0 | Status: CANCELLED | OUTPATIENT
Start: 2025-05-09

## 2025-05-09 RX ORDER — DEXTROAMPHETAMINE SACCHARATE, AMPHETAMINE ASPARTATE MONOHYDRATE, DEXTROAMPHETAMINE SULFATE AND AMPHETAMINE SULFATE 5; 5; 5; 5 MG/1; MG/1; MG/1; MG/1
20 CAPSULE, EXTENDED RELEASE ORAL 2 TIMES DAILY
Refills: 0 | OUTPATIENT
Start: 2025-05-09

## 2025-05-13 RX ORDER — DEXTROAMPHETAMINE SACCHARATE, AMPHETAMINE ASPARTATE, DEXTROAMPHETAMINE SULFATE AND AMPHETAMINE SULFATE 5; 5; 5; 5 MG/1; MG/1; MG/1; MG/1
20 TABLET ORAL 2 TIMES DAILY
Qty: 60 TABLET | Refills: 0 | Status: SHIPPED | OUTPATIENT
Start: 2025-05-13 | End: 2025-06-12

## 2025-05-13 RX ORDER — DEXTROAMPHETAMINE SACCHARATE, AMPHETAMINE ASPARTATE, DEXTROAMPHETAMINE SULFATE AND AMPHETAMINE SULFATE 5; 5; 5; 5 MG/1; MG/1; MG/1; MG/1
20 TABLET ORAL 2 TIMES DAILY
Qty: 60 TABLET | Refills: 0 | Status: SHIPPED | OUTPATIENT
Start: 2025-07-14 | End: 2025-07-16

## 2025-05-13 RX ORDER — DEXTROAMPHETAMINE SACCHARATE, AMPHETAMINE ASPARTATE, DEXTROAMPHETAMINE SULFATE AND AMPHETAMINE SULFATE 5; 5; 5; 5 MG/1; MG/1; MG/1; MG/1
20 TABLET ORAL 2 TIMES DAILY
Qty: 60 TABLET | Refills: 0 | Status: SHIPPED | OUTPATIENT
Start: 2025-06-13 | End: 2025-07-16

## 2025-06-06 DIAGNOSIS — Z51.81 ENCOUNTER FOR THERAPEUTIC DRUG LEVEL MONITORING: ICD-10-CM

## 2025-06-06 DIAGNOSIS — F50.819 BINGE EATING DISORDER, UNSPECIFIED SEVERITY: ICD-10-CM

## 2025-06-06 DIAGNOSIS — E66.811 CLASS 1 OBESITY WITH SERIOUS COMORBIDITY AND BODY MASS INDEX (BMI) OF 30.0 TO 30.9 IN ADULT, UNSPECIFIED OBESITY TYPE: ICD-10-CM

## 2025-06-06 DIAGNOSIS — E78.2 MIXED HYPERLIPIDEMIA: ICD-10-CM

## 2025-06-06 DIAGNOSIS — F41.1 GENERALIZED ANXIETY DISORDER: ICD-10-CM

## 2025-06-06 NOTE — TELEPHONE ENCOUNTER
Requesting   Requested Prescriptions     Pending Prescriptions Disp Refills    metFORMIN  MG Oral Tablet 24 Hr 180 tablet 0     Sig: Take 2 tablets (1,500 mg total) by mouth daily.      LOV: 3/19/25  RTC: 3-6mo  Last Relevant Labs:   Filled: 03/19/25 #180 with 0 refills    Future Appointments   Date Time Provider Department Center   7/23/2025 12:40 PM Laura Love PA-C [id number] [address]   8/5/2025  8:20 AM Ellie Wray MD [facility]

## 2025-06-08 RX ORDER — METFORMIN HYDROCHLORIDE 750 MG/1
1500 TABLET, EXTENDED RELEASE ORAL DAILY
Qty: 180 TABLET | Refills: 0 | Status: SHIPPED | OUTPATIENT
Start: 2025-06-08

## 2025-06-17 DIAGNOSIS — Z51.81 ENCOUNTER FOR THERAPEUTIC DRUG LEVEL MONITORING: ICD-10-CM

## 2025-06-17 DIAGNOSIS — E66.3 OVERWEIGHT (BMI 25.0-29.9): ICD-10-CM

## 2025-06-17 DIAGNOSIS — F50.819 BINGE EATING DISORDER, UNSPECIFIED SEVERITY: ICD-10-CM

## 2025-06-17 DIAGNOSIS — F41.1 GENERALIZED ANXIETY DISORDER: ICD-10-CM

## 2025-06-17 RX ORDER — DEXTROAMPHETAMINE SACCHARATE, AMPHETAMINE ASPARTATE, DEXTROAMPHETAMINE SULFATE AND AMPHETAMINE SULFATE 5; 5; 5; 5 MG/1; MG/1; MG/1; MG/1
20 TABLET ORAL 2 TIMES DAILY
Qty: 60 TABLET | Refills: 0 | Status: CANCELLED | OUTPATIENT
Start: 2025-06-17

## 2025-06-18 NOTE — TELEPHONE ENCOUNTER
Requesting   Requested Prescriptions     Pending Prescriptions Disp Refills    amphetamine-dextroamphetamine (ADDERALL) 20 MG Oral Tab 60 tablet 0     Sig: Take 1 tablet (20 mg total) by mouth 2 (two) times daily.      LOV: 03/19/25  RTC: 3-6mo  Last Relevant Labs:   Filled: 05/20/25 #30 with 0 refills    Future Appointments   Date Time Provider Department Center   7/23/2025 12:40 PM Laura Love PA-C [id number] [address]   8/5/2025  8:20 AM Ellie Wray MD [facility]

## 2025-06-19 DIAGNOSIS — Z51.81 ENCOUNTER FOR THERAPEUTIC DRUG LEVEL MONITORING: ICD-10-CM

## 2025-06-19 DIAGNOSIS — F50.819 BINGE EATING DISORDER, UNSPECIFIED SEVERITY: ICD-10-CM

## 2025-06-19 DIAGNOSIS — E66.3 OVERWEIGHT (BMI 25.0-29.9): ICD-10-CM

## 2025-06-19 DIAGNOSIS — F41.1 GENERALIZED ANXIETY DISORDER: ICD-10-CM

## 2025-06-19 RX ORDER — DEXTROAMPHETAMINE SACCHARATE, AMPHETAMINE ASPARTATE MONOHYDRATE, DEXTROAMPHETAMINE SULFATE AND AMPHETAMINE SULFATE 5; 5; 5; 5 MG/1; MG/1; MG/1; MG/1
20 CAPSULE, EXTENDED RELEASE ORAL 2 TIMES DAILY
Refills: 0 | OUTPATIENT
Start: 2025-06-19

## 2025-07-05 ENCOUNTER — PATIENT MESSAGE (OUTPATIENT)
Dept: FAMILY MEDICINE CLINIC | Facility: CLINIC | Age: 45
End: 2025-07-05

## 2025-07-08 ENCOUNTER — APPOINTMENT (OUTPATIENT)
Dept: CT IMAGING | Age: 45
End: 2025-07-08
Attending: NURSE PRACTITIONER
Payer: COMMERCIAL

## 2025-07-08 ENCOUNTER — HOSPITAL ENCOUNTER (OUTPATIENT)
Age: 45
Discharge: HOME | End: 2025-07-08
Payer: COMMERCIAL

## 2025-07-08 VITALS
TEMPERATURE: 98 F | SYSTOLIC BLOOD PRESSURE: 154 MMHG | RESPIRATION RATE: 16 BRPM | OXYGEN SATURATION: 100 % | HEART RATE: 78 BPM | DIASTOLIC BLOOD PRESSURE: 104 MMHG

## 2025-07-08 DIAGNOSIS — R06.02 SHORTNESS OF BREATH: ICD-10-CM

## 2025-07-08 DIAGNOSIS — R53.83 OTHER FATIGUE: ICD-10-CM

## 2025-07-08 DIAGNOSIS — G89.29 CHRONIC NONINTRACTABLE HEADACHE, UNSPECIFIED HEADACHE TYPE: Primary | ICD-10-CM

## 2025-07-08 DIAGNOSIS — R51.9 CHRONIC NONINTRACTABLE HEADACHE, UNSPECIFIED HEADACHE TYPE: Primary | ICD-10-CM

## 2025-07-08 LAB
#MXD IC: 0.5 X10ˆ3/UL (ref 0.1–1)
ATRIAL RATE: 74 BPM
B-HCG UR QL: NEGATIVE
BILIRUB UR QL STRIP: NEGATIVE
BUN BLD-MCNC: 10 MG/DL (ref 7–18)
CHLORIDE BLD-SCNC: 102 MMOL/L (ref 98–112)
CLARITY UR: CLEAR
CO2 BLD-SCNC: 24 MMOL/L (ref 21–32)
COLOR UR: YELLOW
CREAT BLD-MCNC: 0.7 MG/DL (ref 0.55–1.02)
EGFRCR SERPLBLD CKD-EPI 2021: 109 ML/MIN/1.73M2 (ref 60–?)
GLUCOSE BLD-MCNC: 97 MG/DL (ref 70–99)
GLUCOSE UR STRIP-MCNC: NEGATIVE MG/DL
HCT VFR BLD AUTO: 42.7 % (ref 35–48)
HCT VFR BLD CALC: 43 % (ref 34–50)
HGB BLD-MCNC: 12.8 G/DL (ref 12–16)
ISTAT IONIZED CALCIUM FOR CHEM 8: 1.17 MMOL/L (ref 1.12–1.32)
KETONES UR STRIP-MCNC: NEGATIVE MG/DL
LEUKOCYTE ESTERASE UR QL STRIP: NEGATIVE
LYMPHOCYTES # BLD AUTO: 2.3 X10ˆ3/UL (ref 1–4)
LYMPHOCYTES NFR BLD AUTO: 36.5 %
MCH RBC QN AUTO: 26.6 PG (ref 26–34)
MCHC RBC AUTO-ENTMCNC: 30 G/DL (ref 31–37)
MCV RBC AUTO: 88.6 FL (ref 80–100)
MIXED CELL %: 8.3 %
NEUTROPHILS # BLD AUTO: 3.4 X10ˆ3/UL (ref 1.5–7.7)
NEUTROPHILS NFR BLD AUTO: 55.2 %
NITRITE UR QL STRIP: NEGATIVE
P AXIS: 54 DEGREES
P-R INTERVAL: 146 MS
PH UR STRIP: 7 [PH]
PLATELET # BLD AUTO: 383 X10ˆ3/UL (ref 150–450)
POTASSIUM BLD-SCNC: 4 MMOL/L (ref 3.6–5.1)
PROT UR STRIP-MCNC: NEGATIVE MG/DL
Q-T INTERVAL: 362 MS
QRS DURATION: 74 MS
QTC CALCULATION (BEZET): 401 MS
R AXIS: 28 DEGREES
RBC # BLD AUTO: 4.82 X10ˆ6/UL (ref 3.8–5.3)
SODIUM BLD-SCNC: 138 MMOL/L (ref 136–145)
SP GR UR STRIP: 1.02
T AXIS: 112 DEGREES
TROPONIN I BLD-MCNC: <0.02 NG/ML (ref ?–0.05)
UROBILINOGEN UR STRIP-ACNC: <2 MG/DL
VENTRICULAR RATE: 74 BPM
WBC # BLD AUTO: 6.2 X10ˆ3/UL (ref 4–11)

## 2025-07-08 PROCEDURE — 99215 OFFICE O/P EST HI 40 MIN: CPT

## 2025-07-08 PROCEDURE — 70450 CT HEAD/BRAIN W/O DYE: CPT | Performed by: NURSE PRACTITIONER

## 2025-07-08 PROCEDURE — 93005 ELECTROCARDIOGRAM TRACING: CPT

## 2025-07-08 PROCEDURE — 96361 HYDRATE IV INFUSION ADD-ON: CPT

## 2025-07-08 PROCEDURE — 81025 URINE PREGNANCY TEST: CPT

## 2025-07-08 PROCEDURE — 84484 ASSAY OF TROPONIN QUANT: CPT

## 2025-07-08 PROCEDURE — 96375 TX/PRO/DX INJ NEW DRUG ADDON: CPT

## 2025-07-08 PROCEDURE — 93010 ELECTROCARDIOGRAM REPORT: CPT

## 2025-07-08 PROCEDURE — 85025 COMPLETE CBC W/AUTO DIFF WBC: CPT | Performed by: NURSE PRACTITIONER

## 2025-07-08 PROCEDURE — 81002 URINALYSIS NONAUTO W/O SCOPE: CPT

## 2025-07-08 PROCEDURE — 96374 THER/PROPH/DIAG INJ IV PUSH: CPT

## 2025-07-08 PROCEDURE — 80047 BASIC METABLC PNL IONIZED CA: CPT

## 2025-07-08 PROCEDURE — 96376 TX/PRO/DX INJ SAME DRUG ADON: CPT

## 2025-07-08 RX ORDER — SODIUM CHLORIDE 9 MG/ML
1000 INJECTION, SOLUTION INTRAVENOUS ONCE
Status: COMPLETED | OUTPATIENT
Start: 2025-07-08 | End: 2025-07-08

## 2025-07-08 RX ORDER — KETOROLAC TROMETHAMINE 30 MG/ML
15 INJECTION, SOLUTION INTRAMUSCULAR; INTRAVENOUS ONCE
Status: COMPLETED | OUTPATIENT
Start: 2025-07-08 | End: 2025-07-08

## 2025-07-08 RX ORDER — METOCLOPRAMIDE HYDROCHLORIDE 5 MG/ML
10 INJECTION INTRAMUSCULAR; INTRAVENOUS ONCE
Status: COMPLETED | OUTPATIENT
Start: 2025-07-08 | End: 2025-07-08

## 2025-07-08 RX ORDER — DIPHENHYDRAMINE HYDROCHLORIDE 50 MG/ML
25 INJECTION, SOLUTION INTRAMUSCULAR; INTRAVENOUS ONCE
Status: COMPLETED | OUTPATIENT
Start: 2025-07-08 | End: 2025-07-08

## 2025-07-08 NOTE — ED PROVIDER NOTES
Patient Seen in: [facility]        History  No chief complaint on file.    Stated Complaint: Vertigo    Subjective:   This is a 44-year-old female with below state medical history.  Presents to immediate care with multiple complaints.  Patient states for the last 2 months she has been experiencing an increase in fatigue and shortness of breath.  Patient states she is also been having headaches and dizziness that are different than her normal migraines.  Reports she feels like she is off balance.  She concerned about low iron.  History of this due to poor iron intake.  Patient states she takes a few different PPIs to control her GERD.  She has been taking iron supplements and feels constipated.  Reports her menstrual cycles are heavy.  No blood in the stool.  No urinary symptoms.  No chest pain.  She has scheduled follow-up with her primary in August.    The history is provided by the patient.                     Objective:     Past Medical History:    Allergic rhinitis    Anxiety    Bloating    Depression    Depressive disorder, not elsewhere classified    Diarrhea, unspecified    Dysmenorrhea    Esophageal reflux    Fatigue    Food intolerance    Frequent use of laxatives    Generalized anxiety disorder    Headache disorder    Heartburn    Heavy menses    Hemorrhoids    History of depression    Indigestion    Iron deficiency anemia secondary to inadequate dietary iron intake    Mixed hyperlipidemia    Night sweats    Ovarian cyst    Panic disorder without agoraphobia    Pap smear for cervical cancer screening    wnl    Weight gain              Past Surgical History:   Procedure Laterality Date    Cholecystectomy            Other surgical history      wisdom teeth    Other surgical history  2015    ovarian cystectomy                Social History     Socioeconomic History    Marital status:    Tobacco Use    Smoking status: Never     Passive exposure: Never    Smokeless  tobacco: Never   Vaping Use    Vaping status: Never Used   Substance and Sexual Activity    Alcohol use: Yes     Alcohol/week: 3.0 standard drinks of alcohol     Types: 3 Cans of beer per week     Comment: OCC, Socially 2-3 beers    Drug use: Never    Sexual activity: Yes     Partners: Male     Birth control/protection: OCP   Other Topics Concern    Caffeine Concern Yes    Stress Concern No    Weight Concern Yes    Special Diet No    Exercise Yes    Seat Belt Yes     Social Drivers of Health     Food Insecurity: No Food Insecurity (1/18/2025)    NCSS - Food Insecurity     Worried About Running Out of Food in the Last Year: No     Ran Out of Food in the Last Year: No   Transportation Needs: No Transportation Needs (1/18/2025)    NCSS - Transportation     Lack of Transportation: No   Housing Stability: Not At Risk (1/18/2025)    NCSS - Housing/Utilities     Has Housing: Yes     Worried About Losing Housing: No     Unable to Get Utilities: No              Review of Systems   Constitutional:  Positive for fatigue. Negative for chills and fever.   HENT:  Negative for congestion and sore throat.    Respiratory:  Positive for shortness of breath. Negative for wheezing and stridor.    Cardiovascular:  Negative for chest pain, palpitations and leg swelling.   Gastrointestinal:  Negative for abdominal pain, constipation, diarrhea, nausea and vomiting.   Genitourinary:  Negative for dysuria.   Musculoskeletal:  Negative for back pain, neck pain and neck stiffness.   Skin:  Negative for rash.   Neurological:  Positive for dizziness, weakness, light-headedness and headaches. Negative for tremors, syncope and numbness.       Positive for stated complaint: Vertigo  Other systems are as noted in HPI.  Constitutional and vital signs reviewed.      All other systems reviewed and negative except as noted above.                  Physical Exam    ED Triage Vitals [07/08/25 1109]   BP (!) 157/100   Pulse 78   Resp 16   Temp 98.2 °F (36.8  °C)   Temp src Oral   SpO2 100 %   O2 Device None (Room air)       Current Vitals:   Vital Signs  BP: (!) 157/100  Pulse: 78  Resp: 16  Temp: 98.2 °F (36.8 °C)  Temp src: Oral    Oxygen Therapy  SpO2: 100 %  O2 Device: None (Room air)            Physical Exam  Vitals and nursing note reviewed.   Constitutional:       General: She is not in acute distress.     Appearance: Normal appearance. She is not ill-appearing, toxic-appearing or diaphoretic.   HENT:      Head: Normocephalic and atraumatic.      Right Ear: External ear normal.      Left Ear: External ear normal.      Nose: Nose normal.      Mouth/Throat:      Mouth: Mucous membranes are moist.      Pharynx: Oropharynx is clear.   Eyes:      General:         Right eye: No discharge.         Left eye: No discharge.      Extraocular Movements: Extraocular movements intact.      Conjunctiva/sclera: Conjunctivae normal.   Cardiovascular:      Rate and Rhythm: Normal rate.      Heart sounds: Normal heart sounds.   Pulmonary:      Effort: Pulmonary effort is normal.      Breath sounds: Normal breath sounds.   Abdominal:      General: Bowel sounds are normal.      Palpations: Abdomen is soft.      Tenderness: There is no abdominal tenderness.   Musculoskeletal:      Cervical back: Neck supple.      Right lower leg: No edema.      Left lower leg: No edema.   Skin:     General: Skin is warm and dry.      Capillary Refill: Capillary refill takes less than 2 seconds.      Findings: No rash.   Neurological:      General: No focal deficit present.      Mental Status: She is alert and oriented to person, place, and time.   Psychiatric:         Mood and Affect: Mood normal.         Behavior: Behavior normal.               ED Course  Labs Reviewed   POCT CBC - Abnormal; Notable for the following components:       Result Value    MCHC IC 30.0 (*)     All other components within normal limits   [facility] POCT URINALYSIS DIPSTICK - Abnormal; Notable for the following components:     Blood, Urine Trace-Intact (*)     All other components within normal limits   POCT ISTAT CHEM8 CARTRIDGE - Normal   POCT PREGNANCY URINE - Normal   ISTAT TROPONIN - Normal     EKG    Rate, intervals and axes as noted on EKG Report.  Rate: 74  Rhythm: Sinus Rhythm  Reading: Normal sinus rhythm, normal intervals, no evidence of ST elevation or depression.               Labs, EKG, urine, urine pregnancy, CT brain, IV fluid hydration                  MDM       Vital signs stable.  Patient is well-appearing and nontoxic looking.  Presents to immediate care with multiple complaints including headaches, dizziness, shortness of breath, and extreme fatigue.    Differential diagnosis includes but is not limited to anemia, electrolyte disturbance, migraine, tension headache, cluster headache, cardiac event, intracranial mass, anxiety, vertigo    Neurological exam is unremarkable with no focal deficits.    CT brain films interpreted and reviewed by myself.  Results show no acute intracranial abnormality.    EKG shows no evidence of acute ischemia.  Troponin is negative.  This is reassuring patient's symptoms are not from a cardiac etiology.  Considering symptoms of been ongoing for approximately 2 months this essentially rules out cardiac ischemia.    CBC and i-STAT unremarkable.  UA appears infected.  Urine pregnancy is negative.    The patient was reevaluated states her headache is worsening.  Headache cocktail ordered.    We discussed we are unable to perform any more specific tests in regards to anemia in this clinic.  Patient verbalized understanding.    Clinical impression is migraines.  No specific etiology to explain the fatigue and shortness of breath.  Possible anxiety component.    I do not believe patient requires any further emergent workup at this time.    She was reevaluated after headache cocktail and states her headache has completely resolved but she feels jittery.  Likely due to Reglan.  Second dose of  Benadryl ordered prior to discharge.    DC home.  Continue previously prescribed headache medications.  Close follow-up recommended with primary.  ER precautions reviewed.  Patient and her  verbalized understanding, and agreed with plan of care.  All questions answered.     This case was discussed with my attending physician Dr. Hunt.  He agreed with the plan.  No further recommendations.     Medical Decision Making      Disposition and Plan     Clinical Impression:  1. Chronic nonintractable headache, unspecified headache type    2. Other fatigue    3. Shortness of breath         Disposition:  Discharge  7/8/2025 12:59 pm    Follow-up:  Ellie Wray MD  [address] [place] [id number]  [phone]    In 1 week            Medications Prescribed:  Current Discharge Medication List                Supplementary Documentation:

## 2025-07-08 NOTE — DISCHARGE INSTRUCTIONS
Please follow closely with your primary care provider as discussed.  If symptoms worsen in any way please go directly to the emergency department.

## 2025-07-09 ENCOUNTER — LAB ENCOUNTER (OUTPATIENT)
Dept: LAB | Age: 45
End: 2025-07-09
Attending: FAMILY MEDICINE
Payer: COMMERCIAL

## 2025-07-09 ENCOUNTER — NURSE TRIAGE (OUTPATIENT)
Dept: FAMILY MEDICINE CLINIC | Facility: CLINIC | Age: 45
End: 2025-07-09

## 2025-07-09 DIAGNOSIS — D50.9 IRON DEFICIENCY ANEMIA, UNSPECIFIED IRON DEFICIENCY ANEMIA TYPE: Primary | ICD-10-CM

## 2025-07-09 DIAGNOSIS — Z13.1 SCREENING FOR DIABETES MELLITUS: ICD-10-CM

## 2025-07-09 DIAGNOSIS — Z00.00 LABORATORY EXAMINATION ORDERED AS PART OF A ROUTINE GENERAL MEDICAL EXAMINATION: ICD-10-CM

## 2025-07-09 DIAGNOSIS — D50.9 IRON DEFICIENCY ANEMIA, UNSPECIFIED IRON DEFICIENCY ANEMIA TYPE: ICD-10-CM

## 2025-07-09 LAB
ALBUMIN SERPL-MCNC: 4.4 G/DL (ref 3.2–4.8)
ALBUMIN/GLOB SERPL: 1.8 {RATIO} (ref 1–2)
ALP LIVER SERPL-CCNC: 82 U/L (ref 37–98)
ALT SERPL-CCNC: 15 U/L (ref 10–49)
ANION GAP SERPL CALC-SCNC: 3 MMOL/L (ref 0–18)
AST SERPL-CCNC: 17 U/L (ref ?–34)
BASOPHILS # BLD AUTO: 0.09 X10(3) UL (ref 0–0.2)
BASOPHILS NFR BLD AUTO: 1.4 %
BILIRUB SERPL-MCNC: 0.4 MG/DL (ref 0.3–1.2)
BUN BLD-MCNC: 9 MG/DL (ref 9–23)
CALCIUM BLD-MCNC: 9.2 MG/DL (ref 8.7–10.6)
CHLORIDE SERPL-SCNC: 106 MMOL/L (ref 98–112)
CHOLEST SERPL-MCNC: 192 MG/DL (ref ?–200)
CO2 SERPL-SCNC: 30 MMOL/L (ref 21–32)
CREAT BLD-MCNC: 0.84 MG/DL (ref 0.55–1.02)
DEPRECATED HBV CORE AB SER IA-ACNC: 30 NG/ML (ref 50–306)
EGFRCR SERPLBLD CKD-EPI 2021: 87 ML/MIN/1.73M2 (ref 60–?)
EOSINOPHIL # BLD AUTO: 0.32 X10(3) UL (ref 0–0.7)
EOSINOPHIL NFR BLD AUTO: 4.9 %
ERYTHROCYTE [DISTWIDTH] IN BLOOD BY AUTOMATED COUNT: 14.5 %
EST. AVERAGE GLUCOSE BLD GHB EST-MCNC: 111 MG/DL (ref 68–126)
FASTING PATIENT LIPID ANSWER: YES
FASTING STATUS PATIENT QL REPORTED: YES
GLOBULIN PLAS-MCNC: 2.5 G/DL (ref 2–3.5)
GLUCOSE BLD-MCNC: 120 MG/DL (ref 70–99)
HBA1C MFR BLD: 5.5 % (ref ?–5.7)
HCT VFR BLD AUTO: 40.6 % (ref 35–48)
HDLC SERPL-MCNC: 61 MG/DL (ref 40–59)
HGB BLD-MCNC: 12.7 G/DL (ref 12–16)
IMM GRANULOCYTES # BLD AUTO: 0.02 X10(3) UL (ref 0–1)
IMM GRANULOCYTES NFR BLD: 0.3 %
IRON SATN MFR SERPL: 25 % (ref 15–50)
IRON SERPL-MCNC: 102 UG/DL (ref 50–170)
LDLC SERPL CALC-MCNC: 88 MG/DL (ref ?–100)
LYMPHOCYTES # BLD AUTO: 2.38 X10(3) UL (ref 1–4)
LYMPHOCYTES NFR BLD AUTO: 36.3 %
MCH RBC QN AUTO: 27.6 PG (ref 26–34)
MCHC RBC AUTO-ENTMCNC: 31.3 G/DL (ref 31–37)
MCV RBC AUTO: 88.3 FL (ref 80–100)
MONOCYTES # BLD AUTO: 0.33 X10(3) UL (ref 0.1–1)
MONOCYTES NFR BLD AUTO: 5 %
NEUTROPHILS # BLD AUTO: 3.42 X10 (3) UL (ref 1.5–7.7)
NEUTROPHILS # BLD AUTO: 3.42 X10(3) UL (ref 1.5–7.7)
NEUTROPHILS NFR BLD AUTO: 52.1 %
NONHDLC SERPL-MCNC: 131 MG/DL (ref ?–130)
OSMOLALITY SERPL CALC.SUM OF ELEC: 288 MOSM/KG (ref 275–295)
PLATELET # BLD AUTO: 397 10(3)UL (ref 150–450)
POTASSIUM SERPL-SCNC: 4 MMOL/L (ref 3.5–5.1)
PROT SERPL-MCNC: 6.9 G/DL (ref 5.7–8.2)
RBC # BLD AUTO: 4.6 X10(6)UL (ref 3.8–5.3)
SODIUM SERPL-SCNC: 139 MMOL/L (ref 136–145)
TOTAL IRON BINDING CAPACITY: 416 UG/DL (ref 250–425)
TRANSFERRIN SERPL-MCNC: 341 MG/DL (ref 250–380)
TRIGL SERPL-MCNC: 263 MG/DL (ref 30–149)
TSI SER-ACNC: 0.95 UIU/ML (ref 0.55–4.78)
VLDLC SERPL CALC-MCNC: 43 MG/DL (ref 0–30)
WBC # BLD AUTO: 6.6 X10(3) UL (ref 4–11)

## 2025-07-09 PROCEDURE — 80061 LIPID PANEL: CPT

## 2025-07-09 PROCEDURE — 85025 COMPLETE CBC W/AUTO DIFF WBC: CPT

## 2025-07-09 PROCEDURE — 82728 ASSAY OF FERRITIN: CPT

## 2025-07-09 PROCEDURE — 80053 COMPREHEN METABOLIC PANEL: CPT

## 2025-07-09 PROCEDURE — 84443 ASSAY THYROID STIM HORMONE: CPT

## 2025-07-09 PROCEDURE — 83540 ASSAY OF IRON: CPT

## 2025-07-09 PROCEDURE — 36415 COLL VENOUS BLD VENIPUNCTURE: CPT

## 2025-07-09 PROCEDURE — 83550 IRON BINDING TEST: CPT

## 2025-07-09 PROCEDURE — 83036 HEMOGLOBIN GLYCOSYLATED A1C: CPT

## 2025-07-09 NOTE — TELEPHONE ENCOUNTER
Action Requested: Summary for Provider     []  Critical Lab, Recommendations Needed  [x] Need Additional Advice  []   FYI    []   Need Orders  [] Need Medications Sent to Pharmacy  []  Other     SUMMARY: Patient called to request labs be added on to her annual, Iron and Ferritin, states I'm starting to feel \"anemic\" reports fatigue, nausea, palpitations, headache, restless legs and lightheaded. States she was seen at  yesterday however unable to do Iron/Ferritin studies. EKG normal, states she has appointment with Dr Oneill on 8/5/25.  Advised to keep up hydration, diet high in Iron and will send message to add on labs.      Dr. Wray - see pended lab requests                Reason for call: Dizziness  Onset: Data Unavailable          Reason for Disposition   Dizziness caused by not drinking enough fluids    Protocols used: Dizziness-A-OH

## 2025-07-09 NOTE — TELEPHONE ENCOUNTER
Spoke to patient, informed of lab order, patient verbalizes understanding and is agreeable to plan.

## 2025-07-15 NOTE — CONSULTS
Cancer Center Report of Consultation    Patient Name: Renata Bravo   YOB: 1980   Medical Record Number: [id number]   CSN: [id number]   Consulting Physician: Racheal Cook MD  Referring Physician(s): Ellie Wray MD    Date of Consultation: 7/16/2025    Reason for Consultation:  Renata Bravo was seen today in the Cancer Center for the diagnosis of iron deficiency.    History of Present Illness:     44 year old that I have seen once before in 12/19 with iron deficiency anemia.  Received IV Feraheme at that time.  Now has multiple new complaints and recent labs showed low ferritin.  Presents for the same.  Heavy menses last few months. On OCPs.  More fatigue. Lots of OTC meds for GERD. Was on oral iron but had constipation which made GERD worse.     Past Medical History:  Past Medical History[1]    Past Surgical History:  Past Surgical History[2]    Family Medical History:  Family History[3]    Psychosocial History:  Social History     Socioeconomic History    Marital status:      Spouse name: Not on file    Number of children: Not on file    Years of education: Not on file    Highest education level: Not on file   Occupational History    Not on file   Tobacco Use    Smoking status: Never     Passive exposure: Never    Smokeless tobacco: Never   Vaping Use    Vaping status: Never Used   Substance and Sexual Activity    Alcohol use: Yes     Alcohol/week: 3.0 standard drinks of alcohol     Types: 3 Cans of beer per week     Comment: OCC, Socially 2-3 beers    Drug use: Never    Sexual activity: Yes     Partners: Male     Birth control/protection: OCP   Other Topics Concern     Service Not Asked    Blood Transfusions Not Asked    Caffeine Concern Yes    Occupational Exposure Not Asked    Hobby Hazards Not Asked    Sleep Concern Not Asked    Stress Concern No    Weight Concern Yes    Special Diet No    Back Care Not Asked    Exercise Yes    Bike Helmet Not Asked    Seat Belt Yes     Self-Exams Not Asked   Social History Narrative    Not on file     Social Drivers of Health     Food Insecurity: No Food Insecurity (1/18/2025)    NCSS - Food Insecurity     Worried About Running Out of Food in the Last Year: No     Ran Out of Food in the Last Year: No   Transportation Needs: No Transportation Needs (1/18/2025)    NCSS - Transportation     Lack of Transportation: No   Housing Stability: Not At Risk (1/18/2025)    NCSS - Housing/Utilities     Has Housing: Yes     Worried About Losing Housing: No     Unable to Get Utilities: No       Allergies:   Allergies[4]    Current Medications:  Medications - Current[5]    Review of Systems:    Constitutional: Fatigue.  Eyes: No visual disturbance, irritation and redness.  Ears, nose, mouth, throat, and face: No hearing loss, tinnitus, hoarseness and voice change.  Respiratory: No cough, sputum, hemoptysis, chest pain, wheezing, dyspnea on exertion  Cardiovascular: No chest pain, palpitations, syncope,orthopnea, PND, edema  Gastrointestinal: Heartburn.  Derm: No rash, skin lesions, and pruritus.  Hematologic/lymphatic: Menorrhagia.  Musculoskeletal: No myalgias, arthralgias, muscle weakness.  Neurological: No headaches, dizziness, seizures, speech problems, gait problems   Psych: No anxiety/depression.    Vital Signs:  LMP 01/09/2025 (Approximate)     ECOG PS: 0    Physical Examination:    General: Patient is alert and oriented x 3, not in acute distress.  Neck: No lymphadenopathy.   Lymphatics: There is no palpable lymphadenopathy  in the cervical, axillary, or inguinal regions.  Chest: Clear to auscultation.  Heart: Regular rate and rhythm. S1S2 normal.  Abdomen: Soft, non tender with good bowel sounds.  No hepatosplenomegaly/mass.    Extremities: Pedal pulses are present. No edema.  Neurological: Grossly intact.      Labs:         Assessment and Plan:    # Iron deficiency: Due to menorrhagia.  Received IV iron in 2019.  Ferritin low now.  Proceed with IV  iron today.  Discussed small risk of infusion reaction.  She has upcoming meeting with her gynecologist to discuss changing birth control pills.  Also significant GERD for which EGD has been recommended but she has held off for now and will discuss with her PCP.    Repeat labs in 2 months.  Rule out coagulopathy.  Further management to depend on upcoming labs.  Should get labs at least q 6 monthly until menorrhagia resolves or postmenopausal.          Procedures    CBC With Differential With Platelet    Ferritin    Iron And Tibc    VON WILLEBRAND PANEL [E]    PTT, ACTIVATED [E]    PROTHROMBIN TIME (PT) [E]     Return for Labs 2 months.    Anita Cook M.D.    [facility]    [facility]  [address] [place], [address]    2025           [1]   Past Medical History:   Allergic rhinitis    Anxiety    Bloating    Depression    Depressive disorder, not elsewhere classified    Diarrhea, unspecified    Dysmenorrhea    Esophageal reflux    Fatigue    Food intolerance    Frequent use of laxatives    Generalized anxiety disorder    Headache disorder    Heartburn    Heavy menses    Hemorrhoids    History of depression    Indigestion    Iron deficiency anemia secondary to inadequate dietary iron intake    Mixed hyperlipidemia    Night sweats    Ovarian cyst    Panic disorder without agoraphobia    Pap smear for cervical cancer screening    wnl    Weight gain   [2]   Past Surgical History:  Procedure Laterality Date    Cholecystectomy            Other surgical history      wisdom teeth    Other surgical history  2015    ovarian cystectomy   [3]   Family History  Problem Relation Age of Onset    Hypertension Father     Heart Attack Father     High Cholesterol Father         hyperlipidemia    Heart Disease Father         CAD    Psychiatric Father         anger management issues    Stroke Father         cva    Asthma Father     High Cholesterol Mother          hyperlipidemia    Anemia Mother         ON    Depression Mother     Diabetes Maternal Grandmother     Heart Disease Maternal Grandmother         CAD    Hypertension Maternal Grandfather     Hypertension Paternal Grandfather     Heart Disease Paternal Grandfather         CAD    Hypertension Brother     Heart Disease Brother         LIPID    Asthma Brother     Hypertension Brother     Heart Disease Brother         LIPID    Heart Disease Paternal Grandmother         CAD    Heart Disease Maternal Grandfather         CAD    Depression Paternal Uncle     Depression Paternal Aunt     Anxiety Maternal Uncle     Ear Problems Neg     Allergies Neg     Cancer Neg     Bleeding Disorders Neg     Clotting Disorder Neg     Thyroid disease Neg     Arthritis Neg     Anesthesia Problems  Neg    [4] No Known Allergies  [5]   Current Outpatient Medications:     FAMOTIDINE 40 MG Oral Tab, TAKE 1 TABLET BY MOUTH DAILY, Disp: 90 tablet, Rfl: 0    metFORMIN  MG Oral Tablet 24 Hr, Take 2 tablets (1,500 mg total) by mouth daily., Disp: 180 tablet, Rfl: 0    amphetamine-dextroamphetamine (ADDERALL) 20 MG Oral Tab, Take 1 tablet (20 mg total) by mouth 2 (two) times daily., Disp: 60 tablet, Rfl: 0    sertraline 50 MG Oral Tab, Take 1 tablet (50 mg total) by mouth daily., Disp: 90 tablet, Rfl: 3    Rizatriptan Benzoate 10 MG Oral Tab, Take 1 tablet (10 mg total) by mouth as needed (take at onset of headache, may repeat dose in 2 hours)., Disp: 18 tablet, Rfl: 1    ROSUVASTATIN 5 MG Oral Tab, TAKE 1 TABLET BY MOUTH NIGHTLY, Disp: 90 tablet, Rfl: 3    amphetamine-dextroamphetamine (ADDERALL) 20 MG Oral Tab, Take 1 tablet (20 mg total) by mouth 2 (two) times daily., Disp: 60 tablet, Rfl: 0    FENOFIBRATE MICRONIZED 134 MG Oral Cap, TAKE 1 CAPSULE BY MOUTH DAILY, Disp: 90 capsule, Rfl: 1    metFORMIN  MG Oral Tablet 24 Hr, Take 1 tablet (750 mg total) by mouth daily., Disp: 90 tablet, Rfl: 0    Norgestim-Eth Estrad Triphasic  (TRI-SPRINTEC) 0.18/0.215/0.25 MG-35 MCG Oral Tab, Take 1 tablet by mouth daily., Disp: 84 tablet, Rfl: 3    Amphetamine-Dextroamphet ER 20 MG Oral Capsule SR 24 Hr, Take 1 capsule (20 mg total) by mouth in the morning and 1 capsule (20 mg total) before bedtime., Disp: , Rfl:     Calcium Carbonate Antacid (TUMS) 500 MG Oral Chew Tab, Chew 2 tablets (1,000 mg total) by mouth 2 (two) times daily., Disp: , Rfl:

## 2025-07-16 ENCOUNTER — OFFICE VISIT (OUTPATIENT)
Age: 45
End: 2025-07-16
Attending: INTERNAL MEDICINE
Payer: COMMERCIAL

## 2025-07-16 VITALS
DIASTOLIC BLOOD PRESSURE: 83 MMHG | RESPIRATION RATE: 18 BRPM | HEART RATE: 85 BPM | OXYGEN SATURATION: 98 % | TEMPERATURE: 97 F | BODY MASS INDEX: 31 KG/M2 | SYSTOLIC BLOOD PRESSURE: 155 MMHG | WEIGHT: 186.63 LBS

## 2025-07-16 VITALS
HEART RATE: 64 BPM | TEMPERATURE: 98 F | DIASTOLIC BLOOD PRESSURE: 95 MMHG | RESPIRATION RATE: 16 BRPM | SYSTOLIC BLOOD PRESSURE: 151 MMHG | OXYGEN SATURATION: 100 %

## 2025-07-16 DIAGNOSIS — D50.9 IRON DEFICIENCY ANEMIA, UNSPECIFIED IRON DEFICIENCY ANEMIA TYPE: Primary | ICD-10-CM

## 2025-07-16 DIAGNOSIS — N92.4 EXCESSIVE BLEEDING IN PREMENOPAUSAL PERIOD: ICD-10-CM

## 2025-07-16 RX ORDER — MAGNESIUM HYDROXIDE/ALUMINUM HYDROXICE/SIMETHICONE 120; 1200; 1200 MG/30ML; MG/30ML; MG/30ML
SUSPENSION ORAL 4 TIMES DAILY PRN
COMMUNITY

## 2025-07-16 RX ORDER — FERROUS SULFATE 325(65) MG
325 TABLET, DELAYED RELEASE (ENTERIC COATED) ORAL
COMMUNITY

## 2025-07-16 RX ORDER — OMEPRAZOLE 20 MG/1
20 CAPSULE, DELAYED RELEASE ORAL
COMMUNITY

## 2025-07-16 NOTE — PROGRESS NOTES
Education Record    Learner:  Patient    Disease / Diagnosis:iron def.  Last seen by Dr Cook was 12/2019    Barriers / Limitations:  None   Comments:    Method:  Discussion   Comments:    General Topics:  Plan of care reviewed   Comments:    Outcome:  Shows understanding   Comments:   Pt recently seen in ED for c/o dizziness, HA, fatigue, SOB. Extreme fatigue, makes it hard to do her normal activities.   Reports heavy menses.   Constipation with oral iron, taking some days, and hard with her persistent heartburn   Seeing PCP in August.   On BCP but menses heavy and lasting longer, up to 2 weeks.  Has been heavy for about 8 months.   Increased fatigue also for last 8 months or so.  Has never had colonoscopy or endoscopy

## 2025-07-16 NOTE — PROGRESS NOTES
Pt here for infed. Pt denies any issues or concerns.      Ordering Provider: Joyce Chance Exp: after today's dose     Pt tolerated infusion without difficulty or complaint. Reviewed next apt date/time: labs in 2 months, patient will go to lab closer to home      Education Record  Learner:  Patient  Disease / Diagnosis: anemia  Barriers / Limitations:  None  Method:  Discussion  General Topics:  Medication, Side effects and symptom management, and Plan of care reviewed  Outcome:  Shows understanding

## 2025-07-23 ENCOUNTER — OFFICE VISIT (OUTPATIENT)
Facility: CLINIC | Age: 45
End: 2025-07-23
Payer: COMMERCIAL

## 2025-07-23 VITALS
SYSTOLIC BLOOD PRESSURE: 128 MMHG | WEIGHT: 185 LBS | HEIGHT: 65 IN | BODY MASS INDEX: 30.82 KG/M2 | DIASTOLIC BLOOD PRESSURE: 70 MMHG | RESPIRATION RATE: 18 BRPM | HEART RATE: 80 BPM

## 2025-07-23 DIAGNOSIS — F50.819 BINGE EATING DISORDER, UNSPECIFIED SEVERITY: ICD-10-CM

## 2025-07-23 DIAGNOSIS — E66.811 CLASS 1 OBESITY WITH SERIOUS COMORBIDITY AND BODY MASS INDEX (BMI) OF 30.0 TO 30.9 IN ADULT, UNSPECIFIED OBESITY TYPE: ICD-10-CM

## 2025-07-23 DIAGNOSIS — Z51.81 ENCOUNTER FOR THERAPEUTIC DRUG LEVEL MONITORING: Primary | ICD-10-CM

## 2025-07-23 DIAGNOSIS — F41.1 GENERALIZED ANXIETY DISORDER: ICD-10-CM

## 2025-07-23 DIAGNOSIS — E78.2 MIXED HYPERLIPIDEMIA: ICD-10-CM

## 2025-07-23 PROCEDURE — 3008F BODY MASS INDEX DOCD: CPT | Performed by: PHYSICIAN ASSISTANT

## 2025-07-23 PROCEDURE — 3078F DIAST BP <80 MM HG: CPT | Performed by: PHYSICIAN ASSISTANT

## 2025-07-23 PROCEDURE — 99214 OFFICE O/P EST MOD 30 MIN: CPT | Performed by: PHYSICIAN ASSISTANT

## 2025-07-23 PROCEDURE — 3074F SYST BP LT 130 MM HG: CPT | Performed by: PHYSICIAN ASSISTANT

## 2025-07-23 RX ORDER — DEXTROAMPHETAMINE SACCHARATE, AMPHETAMINE ASPARTATE MONOHYDRATE, DEXTROAMPHETAMINE SULFATE AND AMPHETAMINE SULFATE 5; 5; 5; 5 MG/1; MG/1; MG/1; MG/1
20 CAPSULE, EXTENDED RELEASE ORAL DAILY
Qty: 30 CAPSULE | Refills: 0 | Status: SHIPPED | OUTPATIENT
Start: 2025-08-23 | End: 2025-09-22

## 2025-07-23 RX ORDER — DEXTROAMPHETAMINE SACCHARATE, AMPHETAMINE ASPARTATE MONOHYDRATE, DEXTROAMPHETAMINE SULFATE AND AMPHETAMINE SULFATE 5; 5; 5; 5 MG/1; MG/1; MG/1; MG/1
20 CAPSULE, EXTENDED RELEASE ORAL DAILY
Qty: 30 CAPSULE | Refills: 0 | Status: SHIPPED | OUTPATIENT
Start: 2025-07-23 | End: 2025-08-22

## 2025-07-23 RX ORDER — DEXTROAMPHETAMINE SACCHARATE, AMPHETAMINE ASPARTATE MONOHYDRATE, DEXTROAMPHETAMINE SULFATE AND AMPHETAMINE SULFATE 5; 5; 5; 5 MG/1; MG/1; MG/1; MG/1
20 CAPSULE, EXTENDED RELEASE ORAL DAILY
Qty: 30 CAPSULE | Refills: 0 | Status: SHIPPED | OUTPATIENT
Start: 2025-09-23 | End: 2025-10-23

## 2025-07-23 NOTE — PROGRESS NOTES
HISTORY OF PRESENT ILLNESS  Chief Complaint   Patient presents with    Weight Check     +4lbs ( wc fu 3/19/25 181lb)       Renata Bravo is a 44 year old female here for follow up in medical weight loss program.   +4lbs  Compliant on adderall, metformin  Stopped metformin, didn't feel like it was doing much  Denies chest pain, shortness of breath, dizziness, blurred vision, headache, paresthesia, nausea/vomiting.   Has had a few set backs, work has been very stressful  Just had iron infusion  Laid off the adderall for a little because of the iron deficiency  When she first starts adderall feels in control, but after a few weeks doesn't feel as effective  With the iron deficiency noticed more binging at night    Exercise/Activity: walks 2-3 times a week  Nutrition: 24 hour food log reviewed, eating regular meals, +protein  Stress: 7/10  Sleep: 6 hours/night     [facility] Follow Up    General Information  Nutrition Recall  Breakfast: Cream of wheat Lunch: Acai bowl with fruit   Dinner: Tacos Snacks: Apple with peanut butter, cheese and crackers   Fluids: Water, diet coke Dining Out: 4   Exercise   Patient stated exercises # days/week: 3  Patient stated perceived level of   exertion: 2 Anaerobic Days: 0   Aerobic Days: 3   Patient stated average level of stress: 7  Sleep   Patient stated # hours uninterrupted sleep: 6   Patient stated feels   restful: No      Cause of disruption of sleep: Stress              Wt Readings from Last 6 Encounters:   07/23/25 185 lb (83.9 kg)   07/16/25 186 lb 9.6 oz (84.6 kg)   03/19/25 181 lb (82.1 kg)   01/18/25 178 lb 2 oz (80.8 kg)   10/31/24 179 lb (81.2 kg)   07/17/24 186 lb (84.4 kg)            Breakfast Lunch Dinner Snacks Fluids   Reviewed           REVIEW OF SYSTEMS  GENERAL HEALTH: feels well otherwise, denied any fevers chills or night sweats   RESPIRATORY: denies shortness of breath   CARDIOVASCULAR: denies chest pain  GI: denies abdominal pain    EXAM  /70   Pulse 80    Resp 18   Ht 5' 5\" (1.651 m)   Wt 185 lb (83.9 kg)   LMP 06/27/2025 (Approximate)   BMI 30.79 kg/m²   GENERAL: well developed, well nourished,in no apparent distress, A/O x3  SKIN: no rashes,no suspicious lesions  HEENT: atraumatic, normocephalic, OP-clear, PERRL  NECK: supple,no adenopathy  LUNGS: clear to auscultation bilaterally   CARDIO: RRR without murmur  GI: good BS's,NT/ND, no masses or HSM  EXTREMITIES: no cyanosis, no clubbing, no edema    Lab Results   Component Value Date    WBC 6.6 07/09/2025    RBC 4.60 07/09/2025    HGB 12.7 07/09/2025    HCT 40.6 07/09/2025    MCV 88.3 07/09/2025    MCH 27.6 07/09/2025    MCHC 31.3 07/09/2025    RDW 14.5 07/09/2025    .0 07/09/2025     Lab Results   Component Value Date     (H) 07/09/2025    BUN 9 07/09/2025    BUNCREA 10.5 02/10/2021    CREATSERUM 0.84 07/09/2025    ANIONGAP 3 07/09/2025    GFRNAA 80 06/22/2022    GFRAA 92 06/22/2022    CA 9.2 07/09/2025    OSMOCALC 288 07/09/2025    ALKPHO 82 07/09/2025    AST 17 07/09/2025    ALT 15 07/09/2025    BILT 0.4 07/09/2025    TP 6.9 07/09/2025    ALB 4.4 07/09/2025    GLOBULIN 2.5 07/09/2025     07/09/2025    K 4.0 07/09/2025     07/09/2025    CO2 30.0 07/09/2025     Lab Results   Component Value Date     07/09/2025    A1C 5.5 07/09/2025     Lab Results   Component Value Date    CHOLEST 192 07/09/2025    TRIG 263 (H) 07/09/2025    HDL 61 (H) 07/09/2025    LDL 88 07/09/2025    VLDL 43 (H) 07/09/2025    NONHDLC 131 (H) 07/09/2025     Lab Results   Component Value Date    TSH 0.948 07/09/2025     Lab Results   Component Value Date    B12 437 02/10/2021     Lab Results   Component Value Date    VITD 29.3 (L) 06/19/2024       Medications Ordered Prior to Encounter[1]    ASSESSMENT  Analyzed weight data:       Diagnoses and all orders for this visit:    Encounter for therapeutic drug level monitoring  -     Amphetamine-Dextroamphet ER (ADDERALL XR) 20 MG Oral Capsule SR 24 Hr; Take 1  capsule (20 mg total) by mouth daily.  -     Amphetamine-Dextroamphet ER (ADDERALL XR) 20 MG Oral Capsule SR 24 Hr; Take 1 capsule (20 mg total) by mouth daily.  -     Amphetamine-Dextroamphet ER (ADDERALL XR) 20 MG Oral Capsule SR 24 Hr; Take 1 capsule (20 mg total) by mouth daily.  -     semaglutide-weight management 0.25 MG/0.5ML Subcutaneous Solution Auto-injector; Inject 0.5 mL (0.25 mg total) into the skin once a week for 4 doses.    Class 1 obesity with serious comorbidity and body mass index (BMI) of 30.0 to 30.9 in adult, unspecified obesity type  -     Amphetamine-Dextroamphet ER (ADDERALL XR) 20 MG Oral Capsule SR 24 Hr; Take 1 capsule (20 mg total) by mouth daily.  -     Amphetamine-Dextroamphet ER (ADDERALL XR) 20 MG Oral Capsule SR 24 Hr; Take 1 capsule (20 mg total) by mouth daily.  -     Amphetamine-Dextroamphet ER (ADDERALL XR) 20 MG Oral Capsule SR 24 Hr; Take 1 capsule (20 mg total) by mouth daily.  -     semaglutide-weight management 0.25 MG/0.5ML Subcutaneous Solution Auto-injector; Inject 0.5 mL (0.25 mg total) into the skin once a week for 4 doses.    Binge eating disorder, unspecified severity  -     Amphetamine-Dextroamphet ER (ADDERALL XR) 20 MG Oral Capsule SR 24 Hr; Take 1 capsule (20 mg total) by mouth daily.  -     Amphetamine-Dextroamphet ER (ADDERALL XR) 20 MG Oral Capsule SR 24 Hr; Take 1 capsule (20 mg total) by mouth daily.  -     Amphetamine-Dextroamphet ER (ADDERALL XR) 20 MG Oral Capsule SR 24 Hr; Take 1 capsule (20 mg total) by mouth daily.  -     semaglutide-weight management 0.25 MG/0.5ML Subcutaneous Solution Auto-injector; Inject 0.5 mL (0.25 mg total) into the skin once a week for 4 doses.    Mixed hyperlipidemia  -     Amphetamine-Dextroamphet ER (ADDERALL XR) 20 MG Oral Capsule SR 24 Hr; Take 1 capsule (20 mg total) by mouth daily.  -     Amphetamine-Dextroamphet ER (ADDERALL XR) 20 MG Oral Capsule SR 24 Hr; Take 1 capsule (20 mg total) by mouth daily.  -      Amphetamine-Dextroamphet ER (ADDERALL XR) 20 MG Oral Capsule SR 24 Hr; Take 1 capsule (20 mg total) by mouth daily.  -     semaglutide-weight management 0.25 MG/0.5ML Subcutaneous Solution Auto-injector; Inject 0.5 mL (0.25 mg total) into the skin once a week for 4 doses.    Generalized anxiety disorder  -     Amphetamine-Dextroamphet ER (ADDERALL XR) 20 MG Oral Capsule SR 24 Hr; Take 1 capsule (20 mg total) by mouth daily.  -     Amphetamine-Dextroamphet ER (ADDERALL XR) 20 MG Oral Capsule SR 24 Hr; Take 1 capsule (20 mg total) by mouth daily.  -     Amphetamine-Dextroamphet ER (ADDERALL XR) 20 MG Oral Capsule SR 24 Hr; Take 1 capsule (20 mg total) by mouth daily.  -     semaglutide-weight management 0.25 MG/0.5ML Subcutaneous Solution Auto-injector; Inject 0.5 mL (0.25 mg total) into the skin once a week for 4 doses.        PLAN  Initial Weight: 186 lbs  Initial Weight Date: 04/21/23  Today's Weight: 181 lbs  5% Goal: 9.3  10% Goal: 18.6  Total Weight Loss: 7 lbs    Discontinue metformin and adderall  Begin Adderall XR - discussed MOA, advised side effects and adverse effects of medication, discussed side effects including but not limited to:( elevated BP, tremor, anxiety, headache, constipation and serious side effects including arrhythmia and cad ) patient verbalized understanding agrees with the plan  Will begin Wegovy 0.25mg weekly - denies any personal or family history of pancreatitis, pancreatic cancer, thyroid cancer, MEN2 - discussed MOA, advised side effects and adverse effects of medication.  Continue to work on mindful eating  HLD - stable on current medication rosuvastatin, will continue, will continue to work on lifestyle modifications  Mood is stable on current medications, continue to work on stress management  Consistency with logging foods - protein and produce  Incorporate strength/resistance training  Nutrition: low carb diet/ recommended to eat breakfast daily/ regular protein  intake  Medication use and side effects reviewed with patient.  Medication contraindications: n/a  Follow up with dietitian and psychologist as recommended.  Discussed the role of sleep and stress in weight management.  Counseled on comprehensive weight loss plan including attention to nutrition, exercise and behavior/stress management for success. See patient instruction below for more details.  Discussed strategies to overcome barriers to successful weight loss and weight maintenance  FITTE: ACSM recommendations (150-300 minutes/ week in active weight loss)   Weight Loss consent to treat reviewed and signed       NOTE TO PATIENT: The 21st Century Cures Act makes clinical notes like these available to patients in the interest of transparency. Clinical notes are medical documents used by physicians and care providers to communicate with each other. These documents include medical language and terminology, abbreviations, and treatment information that may sound technical and at times possibly unfamiliar. In addition, at times, the verbiage may appear blunt or direct. These documents are one tool providers use to communicate relevant information and clinical opinions of the care providers in a way that allows common understanding of the clinical context.     There are no Patient Instructions on file for this visit.    No follow-ups on file.    Patient verbalizes understanding.    Laura Love PA-C  7/23/2025           [1]   Current Outpatient Medications on File Prior to Visit   Medication Sig Dispense Refill    omeprazole 20 MG Oral Capsule Delayed Release Take 1 capsule (20 mg total) by mouth every morning before breakfast.      alum-mag hydroxide-simethicone 200-200-20 MG/5ML Oral Suspension Take by mouth 4 (four) times daily as needed for Indigestion.      ferrous sulfate 325 (65 FE) MG Oral Tab EC Take 1 tablet (325 mg total) by mouth daily with breakfast. Some days      FAMOTIDINE 40 MG Oral Tab TAKE 1  TABLET BY MOUTH DAILY 90 tablet 0    metFORMIN  MG Oral Tablet 24 Hr Take 2 tablets (1,500 mg total) by mouth daily. 180 tablet 0    sertraline 50 MG Oral Tab Take 1 tablet (50 mg total) by mouth daily. 90 tablet 3    Rizatriptan Benzoate 10 MG Oral Tab Take 1 tablet (10 mg total) by mouth as needed (take at onset of headache, may repeat dose in 2 hours). 18 tablet 1    ROSUVASTATIN 5 MG Oral Tab TAKE 1 TABLET BY MOUTH NIGHTLY 90 tablet 3    amphetamine-dextroamphetamine (ADDERALL) 20 MG Oral Tab Take 1 tablet (20 mg total) by mouth 2 (two) times daily. 60 tablet 0    FENOFIBRATE MICRONIZED 134 MG Oral Cap TAKE 1 CAPSULE BY MOUTH DAILY 90 capsule 1    Norgestim-Eth Estrad Triphasic (TRI-SPRINTEC) 0.18/0.215/0.25 MG-35 MCG Oral Tab Take 1 tablet by mouth daily. 84 tablet 3    Calcium Carbonate Antacid (TUMS) 500 MG Oral Chew Tab Chew 2 tablets (1,000 mg total) by mouth in the morning and 2 tablets (1,000 mg total) before bedtime.       No current facility-administered medications on file prior to visit.

## 2025-07-24 ENCOUNTER — TELEPHONE (OUTPATIENT)
Dept: INTERNAL MEDICINE CLINIC | Facility: CLINIC | Age: 45
End: 2025-07-24

## 2025-07-24 DIAGNOSIS — G43.829 MENSTRUAL MIGRAINE WITHOUT STATUS MIGRAINOSUS, NOT INTRACTABLE: ICD-10-CM

## 2025-07-24 NOTE — TELEPHONE ENCOUNTER
Approved    Prior authorization approved  Payer: [employer] [facility] Case ID: [id number]    [phone]    [phone]  Note from payer: The case has been Approved from  [id number] to [id number]  Approval Details    Authorized from June 24, 2025 to July 24, 2026

## 2025-07-25 RX ORDER — RIZATRIPTAN BENZOATE 10 MG/1
10 TABLET ORAL AS NEEDED
Qty: 18 TABLET | Refills: 1 | Status: SHIPPED | OUTPATIENT
Start: 2025-07-25

## 2025-07-31 RX ORDER — DEXTROAMPHETAMINE SACCHARATE, AMPHETAMINE ASPARTATE, DEXTROAMPHETAMINE SULFATE AND AMPHETAMINE SULFATE 5; 5; 5; 5 MG/1; MG/1; MG/1; MG/1
20 TABLET ORAL 2 TIMES DAILY
Qty: 60 TABLET | Refills: 0 | OUTPATIENT
Start: 2025-07-31 | End: 2025-08-30

## 2025-08-05 ENCOUNTER — OFFICE VISIT (OUTPATIENT)
Dept: FAMILY MEDICINE CLINIC | Facility: CLINIC | Age: 45
End: 2025-08-05

## 2025-08-05 VITALS
TEMPERATURE: 98 F | HEIGHT: 65 IN | SYSTOLIC BLOOD PRESSURE: 126 MMHG | DIASTOLIC BLOOD PRESSURE: 80 MMHG | RESPIRATION RATE: 18 BRPM | OXYGEN SATURATION: 98 % | HEART RATE: 91 BPM | WEIGHT: 185 LBS | BODY MASS INDEX: 30.82 KG/M2

## 2025-08-05 DIAGNOSIS — N92.1 METRORRHAGIA: ICD-10-CM

## 2025-08-05 DIAGNOSIS — Z00.00 ROUTINE GENERAL MEDICAL EXAMINATION AT A HEALTH CARE FACILITY: Primary | ICD-10-CM

## 2025-08-05 DIAGNOSIS — E66.811 CLASS 1 OBESITY DUE TO EXCESS CALORIES WITH SERIOUS COMORBIDITY AND BODY MASS INDEX (BMI) OF 30.0 TO 30.9 IN ADULT: ICD-10-CM

## 2025-08-05 DIAGNOSIS — Z12.11 SCREENING FOR COLON CANCER: ICD-10-CM

## 2025-08-05 DIAGNOSIS — Z12.31 VISIT FOR SCREENING MAMMOGRAM: ICD-10-CM

## 2025-08-05 DIAGNOSIS — R63.2 BINGE EATING: ICD-10-CM

## 2025-08-05 DIAGNOSIS — D50.9 IRON DEFICIENCY ANEMIA, UNSPECIFIED IRON DEFICIENCY ANEMIA TYPE: ICD-10-CM

## 2025-08-05 DIAGNOSIS — E66.09 CLASS 1 OBESITY DUE TO EXCESS CALORIES WITH SERIOUS COMORBIDITY AND BODY MASS INDEX (BMI) OF 30.0 TO 30.9 IN ADULT: ICD-10-CM

## 2025-08-05 DIAGNOSIS — F41.1 GENERALIZED ANXIETY DISORDER: ICD-10-CM

## 2025-08-05 DIAGNOSIS — D17.1 LIPOMA OF TORSO: ICD-10-CM

## 2025-08-05 DIAGNOSIS — E78.2 MIXED HYPERLIPIDEMIA: ICD-10-CM

## 2025-08-05 PROCEDURE — 3008F BODY MASS INDEX DOCD: CPT | Performed by: FAMILY MEDICINE

## 2025-08-05 PROCEDURE — 3074F SYST BP LT 130 MM HG: CPT | Performed by: FAMILY MEDICINE

## 2025-08-05 PROCEDURE — 99396 PREV VISIT EST AGE 40-64: CPT | Performed by: FAMILY MEDICINE

## 2025-08-05 PROCEDURE — 99214 OFFICE O/P EST MOD 30 MIN: CPT | Performed by: FAMILY MEDICINE

## 2025-08-05 PROCEDURE — 3079F DIAST BP 80-89 MM HG: CPT | Performed by: FAMILY MEDICINE

## 2025-08-05 RX ORDER — FENOFIBRATE 134 MG/1
134 CAPSULE ORAL DAILY
Qty: 90 CAPSULE | Refills: 1 | Status: SHIPPED | OUTPATIENT
Start: 2025-08-05

## 2025-08-20 ENCOUNTER — PATIENT MESSAGE (OUTPATIENT)
Facility: CLINIC | Age: 45
End: 2025-08-20

## 2025-08-20 DIAGNOSIS — E66.811 CLASS 1 OBESITY WITH SERIOUS COMORBIDITY AND BODY MASS INDEX (BMI) OF 30.0 TO 30.9 IN ADULT, UNSPECIFIED OBESITY TYPE: ICD-10-CM

## 2025-08-20 DIAGNOSIS — Z51.81 ENCOUNTER FOR THERAPEUTIC DRUG LEVEL MONITORING: Primary | ICD-10-CM

## 2025-08-20 DIAGNOSIS — F50.819 BINGE EATING DISORDER, UNSPECIFIED SEVERITY: ICD-10-CM

## 2025-08-24 RX ORDER — LISDEXAMFETAMINE DIMESYLATE 40 MG/1
40 CAPSULE ORAL DAILY
Qty: 30 CAPSULE | Refills: 0 | Status: SHIPPED | OUTPATIENT
Start: 2025-09-24 | End: 2025-10-24

## 2025-08-24 RX ORDER — LISDEXAMFETAMINE DIMESYLATE 40 MG/1
40 CAPSULE ORAL DAILY
Qty: 30 CAPSULE | Refills: 0 | Status: SHIPPED | OUTPATIENT
Start: 2025-10-25 | End: 2025-11-24

## 2025-08-24 RX ORDER — LISDEXAMFETAMINE DIMESYLATE 40 MG/1
40 CAPSULE ORAL DAILY
Qty: 30 CAPSULE | Refills: 0 | Status: SHIPPED | OUTPATIENT
Start: 2025-08-24 | End: 2025-09-23

## (undated) DIAGNOSIS — G43.829 MENSTRUAL MIGRAINE WITHOUT STATUS MIGRAINOSUS, NOT INTRACTABLE: ICD-10-CM

## (undated) NOTE — MR AVS SNAPSHOT
After Visit Summary   9/20/2019    Emmie Cates    MRN: [id number]           Visit Information     Date & Time  9/20/2019  2:00 PM Provider  ELIEL [facility] Department  [facility] [place]  Dept.  Phone  [phone] 9/20/2020      Future Appointments        Provider Department    2/15/2020 9:20 AM Paulina Wray [facility], [address], [place]      Follow-up Instructions    Return in about 1 year (around 9/20/2020).                   [facility] now offers [facility] Saturday - Sunday  10:00 am - 4:00 pm       Conditions needing urgent attention, but are not life-threatening.          Average cost  $120*       EMERGENCY ROOM         Life-threatening emergencies needing immediate intervention   at a hospital emergency ro

## (undated) NOTE — LETTER
06/17/19        Michael Rene  [phone] [address] [facility] [employer]      Dear Thorpe,    [address] records indicate that you have outstanding lab work and or testing that was ordered for you and has not yet been completed:  Orders Placed This Encount

## (undated) NOTE — MR AVS SNAPSHOT
[facility], [facility]  [address] [facility] [place], [place] [address] [address] [phone]               Thank you for choosing us for your health care visit with ELIEL Salcedo.   We are glad to serve you and happy to provide you with this sum Take 800 mg by mouth every 6 (six) hours as needed for Pain. Last at 1800   Commonly known as:  MOTRIN           loratadine 10 MG Tabs   Take 10 mg by mouth daily as needed for Allergies.    Commonly known as:  CLARITIN           Rizatriptan Benzoate 10 MG office, you can view your past visit information in Handshake by going to Visits < Visit Summaries. Handshake questions? Call [phone] for help. Handshake is NOT to be used for urgent needs. For medical emergencies, dial 911.            Visit [facility]

## (undated) NOTE — LETTER
02/04/19        Vero   [phone] [employer]      Dear Brenna Diaz,    [address] [facility] records indicate that you have outstanding lab work and or testing that was ordered for you and has not yet been completed:  Orders Placed This Encount

## (undated) NOTE — LETTER
02/01/20        Nora Love  [id number] [facility]      Dear Marlen Brunner,    15[address] [facility] records indicate that you have outstanding lab work and or testing that was ordered for you and has not yet been completed:  Orders Placed This Encount

## (undated) NOTE — ED AVS SNAPSHOT
[facility] in [address]    [address] [facility]    Phone:  [phone]    Fax:  [phone]   MRN: [id number]    Department:  [facility] in [address] [place] [address],[address]   Date of Visit:  2/4/2017 Medication List      START taking these medications     Potassium Chloride ER 20 MEQ Tbcr   Quantity:  6 tablet   Take 1 tablet by mouth 2 (two) times daily.             Where to Get Your Medications      These medications were sent to [address] [place] [address]   [phone]       To Check ER Wait Times:  TEXT 'ERwait' to 33634      Click [email or web address]      Or call [phone]    If you have any problems with your follow-up, please call our  at [phone].     Stephy vargas I have read and understand the instructions given to me by my caregivers. 24-Hour Pharmacies        Pharmacy Address Phone Number   [phone] [facility] ([address]) [address] [place] [address].  [facility] & If you've recently had a stay at the Hospital you can access your discharge instructions in [employer] by going to Visits < Admission Summaries.  If you've been to the Emergency Department or your doctor's office, you can view your past visit information in My

## (undated) NOTE — LETTER
07/26/19        Tiara Guillen  [id number] [address]      Dear Tiffani Vila,    6[address] [facility] records indicate that you have outstanding lab work and or testing that was ordered for you and has not yet been completed:  Orders Placed This Encount

## (undated) NOTE — LETTER
12/13/19        [facility]  [id number] [facility]      Dear CASA JONATAN [facility],    1579 [facility] records indicate that you have outstanding lab work and or testing that was ordered for you and has not yet been completed:  Orders Placed This Encount

## (undated) NOTE — LETTER
03/18/21        Nora Love  [id number] [facility]      Dear Marlen Brunner,    1579 [facility] records indicate that you have outstanding lab work and or testing that was ordered for you and has not yet been completed:  Orders Placed This Encount

## (undated) NOTE — LETTER
09/11/20        [facility]  [id number] [facility]      Dear Caitlin Kirkland,    [address] [facility] records indicate that you have outstanding lab work and or testing that was ordered for you and has not yet been completed:  Orders Placed This Encount

## (undated) NOTE — MR AVS SNAPSHOT
[facility] [address] [place] [address] [id number] [phone]               Thank you for choosing us for your health care visit with Selena Roman MD.  We are glad to serve you and happy to provide you with this takes some time to feel better. · Depression saps your energy and concentration. So you won’t be able to do all the things you used to do. Set small goals and do what you can.   Take care of your body  People with depression often lose the desire to take c Take 10 mg by mouth daily as needed for Allergies.    Commonly known as:  CLARITIN           Rizatriptan Benzoate 10 MG Tabs   Take 1 tablet (10 mg total) by mouth as needed for Migraine (1 tablet at onet of headache and repeat another dose in 2 hours if no

## (undated) NOTE — MR AVS SNAPSHOT
[facility] [address] [place] [address]  [address] [place] [address]  [place] [address] [place] [id number]  [phone]               Thank you for choosing us for your health care visit with ELIEL Root. We are glad to serve you and happy to provide you with this summary of your visit. Follow up with your healthcare provider, or as advised, in 2 weeks if all symptoms have not gotten better, or if hearing doesn't go back to normal within 1 month.   When to seek medical advice  Call your healthcare provider right away if any of these occur: ibuprofen 200 MG Tabs   Take 800 mg by mouth every 6 (six) hours as needed for Pain. Last at 1800   Commonly known as:  MOTRIN           loratadine 10 MG Tabs   Take 10 mg by mouth daily as needed for Allergies.    Commonly known as:  CLARITIN           On Visit [facility] online at  [email or web address]

## (undated) NOTE — LETTER
03/16/20        [place] Johnson  [id number] [place]      Dear Letty Lora,    1579 [facility] records indicate that you have outstanding lab work and or testing that was ordered for you and has not yet been completed:  Orders Placed This Encount

## (undated) NOTE — MR AVS SNAPSHOT
[address] [place] [id number]  [phone]               Thank you for choosing us for your health care visit with Selena Roman MD.  We are glad to serve you and happy to provide you with this People with depression often lose the desire to take care of themselves. That only makes their problems worse. During treatment and afterward, make a point to:  · Exercise. It’s a great way to take care of your body.  And studies have shown that exercise he abuse or neglect may play a role. For others, stressful life events or trauma may trigger anxiety disorders. Anxiety can trigger low self-esteem and poor coping skills. Common anxiety disorders  · Panic disorder:  This causes an intense fear of being in da This list is accurate as of: 1/18/17 11:59 PM.  Always use your most recent med list.                Calcium Carbonate Antacid 500 MG Chew   Chew 2 tablets by mouth 2 (two) times daily.    Commonly known as:  TUMS           * Fluticasone Propionate 50 MCG/A discharge instructions in [employer] by going to Visits < Admission Summaries. If you've been to the Emergency Department or your doctor's office, you can view your past visit information in [employer] by going to Visits < Visit Summaries. [employer] questions?

## (undated) NOTE — LETTER
09/24/19        Daniel Padilla  [phone] [id number] [facility]      Dear CASA [facility],    1579 [facility] records indicate that you have outstanding lab work and or testing that was ordered for you and has not yet been completed:  Orders Placed This Encount